# Patient Record
Sex: FEMALE | Race: BLACK OR AFRICAN AMERICAN | Employment: FULL TIME | URBAN - METROPOLITAN AREA
[De-identification: names, ages, dates, MRNs, and addresses within clinical notes are randomized per-mention and may not be internally consistent; named-entity substitution may affect disease eponyms.]

---

## 2022-03-03 ENCOUNTER — APPOINTMENT (OUTPATIENT)
Dept: MRI IMAGING | Age: 37
DRG: 074 | End: 2022-03-03
Attending: HOSPITALIST
Payer: COMMERCIAL

## 2022-03-03 ENCOUNTER — APPOINTMENT (OUTPATIENT)
Dept: CT IMAGING | Age: 37
DRG: 074 | End: 2022-03-03
Attending: STUDENT IN AN ORGANIZED HEALTH CARE EDUCATION/TRAINING PROGRAM
Payer: COMMERCIAL

## 2022-03-03 ENCOUNTER — APPOINTMENT (OUTPATIENT)
Dept: CT IMAGING | Age: 37
DRG: 074 | End: 2022-03-03
Attending: EMERGENCY MEDICINE
Payer: COMMERCIAL

## 2022-03-03 ENCOUNTER — HOSPITAL ENCOUNTER (INPATIENT)
Age: 37
LOS: 5 days | Discharge: HOME | DRG: 074 | End: 2022-03-09
Attending: STUDENT IN AN ORGANIZED HEALTH CARE EDUCATION/TRAINING PROGRAM | Admitting: HOSPITALIST
Payer: COMMERCIAL

## 2022-03-03 DIAGNOSIS — R26.81 GAIT INSTABILITY: ICD-10-CM

## 2022-03-03 DIAGNOSIS — A92.30: ICD-10-CM

## 2022-03-03 DIAGNOSIS — D72.829 LEUKOCYTOSIS, UNSPECIFIED TYPE: ICD-10-CM

## 2022-03-03 DIAGNOSIS — R29.898 RIGHT ARM WEAKNESS: ICD-10-CM

## 2022-03-03 DIAGNOSIS — R20.0 RIGHT FACIAL NUMBNESS: Primary | ICD-10-CM

## 2022-03-03 DIAGNOSIS — Z92.25 HISTORY OF IMMUNOSUPPRESSION THERAPY: ICD-10-CM

## 2022-03-03 DIAGNOSIS — B02.9 VARICELLA-ZOSTER INFECTION: ICD-10-CM

## 2022-03-03 DIAGNOSIS — G04.91 ACUTE MYELITIS (HCC): ICD-10-CM

## 2022-03-03 DIAGNOSIS — M05.79 RHEUMATOID ARTHRITIS INVOLVING MULTIPLE SITES WITH POSITIVE RHEUMATOID FACTOR (HCC): ICD-10-CM

## 2022-03-03 DIAGNOSIS — G04.90 ENCEPHALITIS: ICD-10-CM

## 2022-03-03 LAB
ALBUMIN SERPL-MCNC: 3.4 G/DL (ref 3.5–5)
ALBUMIN/GLOB SERPL: 0.9 {RATIO} (ref 1.1–2.2)
ALP SERPL-CCNC: 44 U/L (ref 45–117)
ALT SERPL-CCNC: 28 U/L (ref 12–78)
ANION GAP SERPL CALC-SCNC: 4 MMOL/L (ref 5–15)
AST SERPL-CCNC: 30 U/L (ref 15–37)
BASOPHILS # BLD: 0 K/UL (ref 0–0.1)
BASOPHILS NFR BLD: 0 % (ref 0–1)
BILIRUB SERPL-MCNC: 0.3 MG/DL (ref 0.2–1)
BUN SERPL-MCNC: 16 MG/DL (ref 6–20)
BUN/CREAT SERPL: 17 (ref 12–20)
CALCIUM SERPL-MCNC: 8.8 MG/DL (ref 8.5–10.1)
CHLORIDE SERPL-SCNC: 105 MMOL/L (ref 97–108)
CO2 SERPL-SCNC: 26 MMOL/L (ref 21–32)
COMMENT, HOLDF: NORMAL
CREAT SERPL-MCNC: 0.95 MG/DL (ref 0.55–1.02)
DIFFERENTIAL METHOD BLD: ABNORMAL
EOSINOPHIL # BLD: 0.1 K/UL (ref 0–0.4)
EOSINOPHIL NFR BLD: 1 % (ref 0–7)
ERYTHROCYTE [DISTWIDTH] IN BLOOD BY AUTOMATED COUNT: 14.8 % (ref 11.5–14.5)
GLOBULIN SER CALC-MCNC: 3.9 G/DL (ref 2–4)
GLUCOSE BLD STRIP.AUTO-MCNC: 43 MG/DL (ref 65–117)
GLUCOSE BLD STRIP.AUTO-MCNC: 49 MG/DL (ref 65–117)
GLUCOSE BLD STRIP.AUTO-MCNC: 63 MG/DL (ref 65–117)
GLUCOSE BLD STRIP.AUTO-MCNC: 73 MG/DL (ref 65–117)
GLUCOSE SERPL-MCNC: 88 MG/DL (ref 65–100)
HCT VFR BLD AUTO: 33.8 % (ref 35–47)
HGB BLD-MCNC: 11 G/DL (ref 11.5–16)
IMM GRANULOCYTES # BLD AUTO: 0 K/UL (ref 0–0.04)
IMM GRANULOCYTES NFR BLD AUTO: 0 % (ref 0–0.5)
INR PPP: 1.1 (ref 0.9–1.1)
LYMPHOCYTES # BLD: 1.4 K/UL (ref 0.8–3.5)
LYMPHOCYTES NFR BLD: 13 % (ref 12–49)
MCH RBC QN AUTO: 27.5 PG (ref 26–34)
MCHC RBC AUTO-ENTMCNC: 32.5 G/DL (ref 30–36.5)
MCV RBC AUTO: 84.5 FL (ref 80–99)
MONOCYTES # BLD: 0.7 K/UL (ref 0–1)
MONOCYTES NFR BLD: 7 % (ref 5–13)
NEUTS SEG # BLD: 8.6 K/UL (ref 1.8–8)
NEUTS SEG NFR BLD: 79 % (ref 32–75)
NRBC # BLD: 0 K/UL (ref 0–0.01)
NRBC BLD-RTO: 0 PER 100 WBC
PLATELET # BLD AUTO: 348 K/UL (ref 150–400)
PMV BLD AUTO: 9 FL (ref 8.9–12.9)
POTASSIUM SERPL-SCNC: 3.8 MMOL/L (ref 3.5–5.1)
PROT SERPL-MCNC: 7.3 G/DL (ref 6.4–8.2)
PROTHROMBIN TIME: 11 SEC (ref 9–11.1)
RBC # BLD AUTO: 4 M/UL (ref 3.8–5.2)
SAMPLES BEING HELD,HOLD: NORMAL
SERVICE CMNT-IMP: ABNORMAL
SERVICE CMNT-IMP: NORMAL
SODIUM SERPL-SCNC: 135 MMOL/L (ref 136–145)
WBC # BLD AUTO: 10.8 K/UL (ref 3.6–11)

## 2022-03-03 PROCEDURE — 72156 MRI NECK SPINE W/O & W/DYE: CPT

## 2022-03-03 PROCEDURE — A9575 INJ GADOTERATE MEGLUMI 0.1ML: HCPCS | Performed by: RADIOLOGY

## 2022-03-03 PROCEDURE — 70553 MRI BRAIN STEM W/O & W/DYE: CPT

## 2022-03-03 PROCEDURE — 70450 CT HEAD/BRAIN W/O DYE: CPT

## 2022-03-03 PROCEDURE — 85610 PROTHROMBIN TIME: CPT

## 2022-03-03 PROCEDURE — 80053 COMPREHEN METABOLIC PANEL: CPT

## 2022-03-03 PROCEDURE — 74011250636 HC RX REV CODE- 250/636: Performed by: HOSPITALIST

## 2022-03-03 PROCEDURE — 74011000636 HC RX REV CODE- 636: Performed by: RADIOLOGY

## 2022-03-03 PROCEDURE — 82962 GLUCOSE BLOOD TEST: CPT

## 2022-03-03 PROCEDURE — 0042T CT CODE NEURO PERF W CBF: CPT

## 2022-03-03 PROCEDURE — 74011250636 HC RX REV CODE- 250/636: Performed by: RADIOLOGY

## 2022-03-03 PROCEDURE — G0378 HOSPITAL OBSERVATION PER HR: HCPCS

## 2022-03-03 PROCEDURE — 74011250637 HC RX REV CODE- 250/637: Performed by: HOSPITALIST

## 2022-03-03 PROCEDURE — 70496 CT ANGIOGRAPHY HEAD: CPT

## 2022-03-03 PROCEDURE — 99285 EMERGENCY DEPT VISIT HI MDM: CPT

## 2022-03-03 PROCEDURE — 36415 COLL VENOUS BLD VENIPUNCTURE: CPT

## 2022-03-03 PROCEDURE — 93005 ELECTROCARDIOGRAM TRACING: CPT

## 2022-03-03 PROCEDURE — 85025 COMPLETE CBC W/AUTO DIFF WBC: CPT

## 2022-03-03 RX ORDER — SODIUM CHLORIDE 9 MG/ML
50 INJECTION, SOLUTION INTRAVENOUS CONTINUOUS
Status: DISCONTINUED | OUTPATIENT
Start: 2022-03-03 | End: 2022-03-05

## 2022-03-03 RX ORDER — ACETAMINOPHEN 325 MG/1
650 TABLET ORAL
Status: DISCONTINUED | OUTPATIENT
Start: 2022-03-03 | End: 2022-03-09 | Stop reason: HOSPADM

## 2022-03-03 RX ORDER — GUAIFENESIN 100 MG/5ML
81 LIQUID (ML) ORAL DAILY
Status: DISCONTINUED | OUTPATIENT
Start: 2022-03-04 | End: 2022-03-09 | Stop reason: HOSPADM

## 2022-03-03 RX ORDER — CILOSTAZOL 100 MG/1
50 TABLET ORAL
Status: DISCONTINUED | OUTPATIENT
Start: 2022-03-04 | End: 2022-03-04

## 2022-03-03 RX ORDER — ACETAMINOPHEN 650 MG/1
650 SUPPOSITORY RECTAL
Status: DISCONTINUED | OUTPATIENT
Start: 2022-03-03 | End: 2022-03-09

## 2022-03-03 RX ORDER — NIFEDIPINE 30 MG/1
30 TABLET, EXTENDED RELEASE ORAL DAILY
Status: DISCONTINUED | OUTPATIENT
Start: 2022-03-04 | End: 2022-03-09 | Stop reason: HOSPADM

## 2022-03-03 RX ORDER — GADOTERATE MEGLUMINE 376.9 MG/ML
10 INJECTION INTRAVENOUS
Status: COMPLETED | OUTPATIENT
Start: 2022-03-03 | End: 2022-03-03

## 2022-03-03 RX ADMIN — SODIUM CHLORIDE 75 ML/HR: 9 INJECTION, SOLUTION INTRAVENOUS at 19:51

## 2022-03-03 RX ADMIN — GADOTERATE MEGLUMINE 10 ML: 376.9 INJECTION INTRAVENOUS at 19:27

## 2022-03-03 RX ADMIN — IOPAMIDOL 140 ML: 755 INJECTION, SOLUTION INTRAVENOUS at 15:48

## 2022-03-03 RX ADMIN — ACETAMINOPHEN 650 MG: 325 TABLET ORAL at 19:59

## 2022-03-03 NOTE — ED PROVIDER NOTES
HPI     Patient is a 43-year-old female who presents today with right facial numbness, right arm weakness, and unsteady gait. Patient says that she woke up yesterday and noticed that she could not feel her right face and had numbness in her right arm. Said that today, symptoms got worse, and now she is having instability with walking. She came to the ER for further evaluation. She is not on blood thinners. No aggravating alleviating factors. No past medical history on file. No past surgical history on file. No family history on file. Social History     Socioeconomic History    Marital status:      Spouse name: Not on file    Number of children: Not on file    Years of education: Not on file    Highest education level: Not on file   Occupational History    Not on file   Tobacco Use    Smoking status: Not on file    Smokeless tobacco: Not on file   Substance and Sexual Activity    Alcohol use: Not on file    Drug use: Not on file    Sexual activity: Not on file   Other Topics Concern    Not on file   Social History Narrative    Not on file     Social Determinants of Health     Financial Resource Strain:     Difficulty of Paying Living Expenses: Not on file   Food Insecurity:     Worried About Running Out of Food in the Last Year: Not on file    Vick of Food in the Last Year: Not on file   Transportation Needs:     Lack of Transportation (Medical): Not on file    Lack of Transportation (Non-Medical):  Not on file   Physical Activity:     Days of Exercise per Week: Not on file    Minutes of Exercise per Session: Not on file   Stress:     Feeling of Stress : Not on file   Social Connections:     Frequency of Communication with Friends and Family: Not on file    Frequency of Social Gatherings with Friends and Family: Not on file    Attends Islam Services: Not on file    Active Member of Clubs or Organizations: Not on file    Attends Club or Organization Meetings: Not on file    Marital Status: Not on file   Intimate Partner Violence:     Fear of Current or Ex-Partner: Not on file    Emotionally Abused: Not on file    Physically Abused: Not on file    Sexually Abused: Not on file   Housing Stability:     Unable to Pay for Housing in the Last Year: Not on file    Number of [place] in the Last Year: Not on file    Unstable Housing in the Last Year: Not on file         ALLERGIES: Patient has no known allergies. Review of Systems   Constitutional: Negative for appetite change and fever. HENT: Negative for congestion and rhinorrhea. Eyes: Negative for discharge and redness. Respiratory: Negative for cough and shortness of breath. Cardiovascular: Negative for chest pain. Gastrointestinal: Negative for abdominal pain, diarrhea, nausea and vomiting. Genitourinary: Negative for decreased urine volume and dysuria. Musculoskeletal: Negative for back pain. Skin: Negative for rash and wound. Neurological: Positive for weakness and numbness. Negative for seizures and headaches. Hematological: Does not bruise/bleed easily. Psychiatric/Behavioral: Negative for agitation. All other systems reviewed and are negative. Vitals:    03/03/22 1529 03/03/22 1614 03/03/22 1631   BP: 125/86 127/86    Pulse: 94  (!) 117   Resp: 16 28 26   Temp: 97.7 °F (36.5 °C)     SpO2: 99%     Weight: 66.7 kg (147 lb)     Height: 5' 3\" (1.6 m)              Physical Exam  Vitals and nursing note reviewed. Constitutional:       General: She is not in acute distress. Appearance: Normal appearance. HENT:      Head: Normocephalic and atraumatic. Nose: Nose normal.      Mouth/Throat:      Mouth: Mucous membranes are moist.      Pharynx: Oropharynx is clear. Eyes:      Extraocular Movements: Extraocular movements intact. Conjunctiva/sclera: Conjunctivae normal.      Pupils: Pupils are equal, round, and reactive to light.    Cardiovascular:      Rate and Rhythm: Normal rate and regular rhythm. Pulses: Normal pulses. Heart sounds: Normal heart sounds. No murmur heard. No friction rub. No gallop. Pulmonary:      Effort: Pulmonary effort is normal.      Breath sounds: Normal breath sounds. Abdominal:      General: Bowel sounds are normal. There is no distension. Palpations: Abdomen is soft. Tenderness: There is no abdominal tenderness. Musculoskeletal:         General: Normal range of motion. Cervical back: Normal range of motion. Skin:     General: Skin is warm and dry. Capillary Refill: Capillary refill takes less than 2 seconds. Neurological:      General: No focal deficit present. Mental Status: She is alert and oriented to person, place, and time. Mental status is at baseline. Comments: No aphasia. No dysarthria. 4/5 MSK right upper extremity. 5 MSK left upper extremity, bilateral lower extremities. Numbness and paresthesias to the right face, neck, and right arm   Psychiatric:         Mood and Affect: Mood normal.          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 Patient said that she first noticed numbness to her right face and right arm yesterday. She said that at work this morning, she started to have difficulty with her gait. She came to the ER for further evaluation. Patient was made a level 2 stroke alert. CT unremarkable. Labs with hypoglycemia. Patient given juice, still with a glucose in the 60s. Plan to give more juice, and reassess. If needed, patient will need IV dextrose. I spoke with neurology, with plan to admit for MRI and MR I of the cervical spine.     Perfect Serve Consult for Admission  5:22 PM    ED Room Number: WU70/49  Patient Name and age:  Erin Hall 39 y.o.  female  Working Diagnosis:   1. Right facial numbness    2. Right arm weakness    3. Gait instability        COVID-19 Suspicion:  no  Sepsis present:  no  Reassessment needed: no  Code Status:  Full Code  Readmission: no  Isolation Requirements:  no  Recommended Level of Care:  med/surg  Department:[facility] - [phone]  Other: Patient is a 26-year-old female who presents today with right face numbness, right arm weakness, and gait instability. Patient said that she started to have numbness and weakness to her right arm yesterday. Today, she was at work when she started to have gait instability. She came to the ER for further evaluation. She was made a level 2 stroke alert. TeleNeurology evaluate the patient. CT head markable. Patient found to be hypoglycemic. Patient given glucose, still with glucose in the 60s. Plan to reassess and give IV dextrose as needed. Neurology recommended other work-up with MR brain and cervical spine. LABORATORY TESTS:  Labs Reviewed   METABOLIC PANEL, COMPREHENSIVE - Abnormal; Notable for the following components:       Result Value    Sodium 135 (*)     Anion gap 4 (*)     Alk. phosphatase 44 (*)     Albumin 3.4 (*)     A-G Ratio 0.9 (*)     All other components within normal limits   CBC WITH AUTOMATED DIFF - Abnormal; Notable for the following components:    HGB 11.0 (*)     HCT 33.8 (*)     RDW 14.8 (*)     NEUTROPHILS 79 (*)     ABS.  NEUTROPHILS 8.6 (*)     All other components within normal limits   GLUCOSE, POC - Abnormal; Notable for the following components:    Glucose (POC) 49 (*)     All other components within normal limits   GLUCOSE, POC - Abnormal; Notable for the following components:    Glucose (POC) 43 (*)     All other components within normal limits   GLUCOSE, POC - Abnormal; Notable for the following components:    Glucose (POC) 63 (*) All other components within normal limits   PROTHROMBIN TIME + INR   SAMPLES BEING HELD       IMAGING RESULTS:  CTA CODE NEURO HEAD AND NECK W CONT   Final Result   CTA Head:   1. No evidence of significant stenosis or aneurysm. CTA Neck:   1. No evidence of significant stenosis. 2. An 11 mm subpleural groundglass nodular opacity in the left upper lobe,   likely infectious or inflammatory. However, follow-up chest CT in 3 months is   recommended. CT Brain Perfusion:   1. No acute abnormality. CT CODE NEURO PERF W CBF   Final Result   CTA Head:   1. No evidence of significant stenosis or aneurysm. CTA Neck:   1. No evidence of significant stenosis. 2. An 11 mm subpleural groundglass nodular opacity in the left upper lobe,   likely infectious or inflammatory. However, follow-up chest CT in 3 months is   recommended. CT Brain Perfusion:   1. No acute abnormality. CT CODE NEURO HEAD WO CONTRAST   Final Result   No acute findings. MEDICATIONS GIVEN:  Medications   iopamidoL (ISOVUE-370) 76 % injection (has no administration in time range)   iopamidoL (ISOVUE-370) 76 % injection 200 mL (140 mL IntraVENous Given 3/3/22 2837)            CONSULTS:  Neurology    IMPRESSION:  1. Right facial numbness    2. Right arm weakness    3.  Gait instability        PLAN:  - Admit to hospitalist    Roxanna Osman MD            Procedures

## 2022-03-03 NOTE — ED NOTES
Blood glucose not checked in triage by triage nurse prior to calling Code Stroke Level 2. Critical level of 43 blood glucose, MD notified.

## 2022-03-03 NOTE — ED TRIAGE NOTES
Pt to ER with c/o right arm weakness, head pain, and right sided head numbness since waking yesterday at 0600. Pt denies taking blood thinners.

## 2022-03-04 PROBLEM — G04.91: Status: ACTIVE | Noted: 2022-03-04

## 2022-03-04 LAB
APPEARANCE CSF: CLEAR
CHOLEST SERPL-MCNC: 159 MG/DL
COLOR CSF: COLORLESS
COVID-19 RAPID TEST, COVR: NOT DETECTED
CRYPTOCOCCUS NEOFORMANS/GATTII, CRNEOG: NOT DETECTED
CYTOMEGALOVIRUS, CYMEG: NOT DETECTED
ENTEROVIRUS, ENTVIR: NOT DETECTED
ESCHERICHIA COLI K1, ECK1: NOT DETECTED
EST. AVERAGE GLUCOSE BLD GHB EST-MCNC: 111 MG/DL
GLUCOSE CSF-MCNC: 53 MG/DL (ref 40–70)
HAEMOPHILUS INFLUENZAE, HAEFLU: NOT DETECTED
HBA1C MFR BLD: 5.5 % (ref 4–5.6)
HDLC SERPL-MCNC: 38 MG/DL
HDLC SERPL: 4.2 {RATIO} (ref 0–5)
HERPES SIMPLEX VIRUS 2, HSIMV2: NOT DETECTED
HSV1 DNA CSF QL NAA+PROBE: NOT DETECTED
HUMAN HERPESVIRUS 6, HUHV6: NOT DETECTED
HUMAN PARECHOVIRUS, HUPARV: NOT DETECTED
LDLC SERPL CALC-MCNC: 79.8 MG/DL (ref 0–100)
LISTERIA MONOCYTOGENES, LISMON: NOT DETECTED
LYMPHOCYTES NFR CSF MANUAL: 94 % (ref 28–96)
MONOCYTES NFR CSF MANUAL: 6 % (ref 16–56)
NEISSERIA MENINGITIDIS, NEIMEN: NOT DETECTED
PROT CSF-MCNC: 42 MG/DL (ref 15–45)
RBC # CSF: 0 /CU MM
SOURCE, COVRS: NORMAL
STREPTOCOCCUS AGALACTIAE, SAGA: NOT DETECTED
STREPTOCOCCUS PNEUMONIAE, STRPNE: NOT DETECTED
TRIGL SERPL-MCNC: 206 MG/DL (ref ?–150)
TSH SERPL DL<=0.05 MIU/L-ACNC: 3.1 UIU/ML (ref 0.36–3.74)
TUBE # CSF: 1
TUBE # CSF: 1
TUBE # CSF: 3
VARICELLA ZOSTER VIRUS, VAZOVI: DETECTED
VLDLC SERPL CALC-MCNC: 41.2 MG/DL
WBC # CSF: 31 /CU MM (ref 0–5)

## 2022-03-04 PROCEDURE — 86592 SYPHILIS TEST NON-TREP QUAL: CPT

## 2022-03-04 PROCEDURE — 65270000029 HC RM PRIVATE

## 2022-03-04 PROCEDURE — 84157 ASSAY OF PROTEIN OTHER: CPT

## 2022-03-04 PROCEDURE — 87205 SMEAR GRAM STAIN: CPT

## 2022-03-04 PROCEDURE — 82164 ANGIOTENSIN I ENZYME TEST: CPT

## 2022-03-04 PROCEDURE — 89050 BODY FLUID CELL COUNT: CPT

## 2022-03-04 PROCEDURE — 82945 GLUCOSE OTHER FLUID: CPT

## 2022-03-04 PROCEDURE — 86617 LYME DISEASE ANTIBODY: CPT

## 2022-03-04 PROCEDURE — 74011000258 HC RX REV CODE- 258: Performed by: INTERNAL MEDICINE

## 2022-03-04 PROCEDURE — 74011250637 HC RX REV CODE- 250/637: Performed by: HOSPITALIST

## 2022-03-04 PROCEDURE — 36415 COLL VENOUS BLD VENIPUNCTURE: CPT

## 2022-03-04 PROCEDURE — 4A03X5D MEASUREMENT OF ARTERIAL FLOW, INTRACRANIAL, EXTERNAL APPROACH: ICD-10-PCS | Performed by: RADIOLOGY

## 2022-03-04 PROCEDURE — 80061 LIPID PANEL: CPT

## 2022-03-04 PROCEDURE — 74011250637 HC RX REV CODE- 250/637: Performed by: INTERNAL MEDICINE

## 2022-03-04 PROCEDURE — 74011250636 HC RX REV CODE- 250/636: Performed by: NURSE PRACTITIONER

## 2022-03-04 PROCEDURE — 86788 WEST NILE VIRUS AB IGM: CPT

## 2022-03-04 PROCEDURE — 87483 CNS DNA AMP PROBE TYPE 12-25: CPT

## 2022-03-04 PROCEDURE — G0378 HOSPITAL OBSERVATION PER HR: HCPCS

## 2022-03-04 PROCEDURE — 87635 SARS-COV-2 COVID-19 AMP PRB: CPT

## 2022-03-04 PROCEDURE — 74011250636 HC RX REV CODE- 250/636: Performed by: INTERNAL MEDICINE

## 2022-03-04 PROCEDURE — 74011000258 HC RX REV CODE- 258: Performed by: NURSE PRACTITIONER

## 2022-03-04 PROCEDURE — 86051 AQUAPORIN-4 ANTB ELISA: CPT

## 2022-03-04 PROCEDURE — 84443 ASSAY THYROID STIM HORMONE: CPT

## 2022-03-04 PROCEDURE — 99255 IP/OBS CONSLTJ NEW/EST HI 80: CPT | Performed by: PSYCHIATRY & NEUROLOGY

## 2022-03-04 PROCEDURE — 83036 HEMOGLOBIN GLYCOSYLATED A1C: CPT

## 2022-03-04 PROCEDURE — 87015 SPECIMEN INFECT AGNT CONCNTJ: CPT

## 2022-03-04 PROCEDURE — 87210 SMEAR WET MOUNT SALINE/INK: CPT

## 2022-03-04 PROCEDURE — 96374 THER/PROPH/DIAG INJ IV PUSH: CPT

## 2022-03-04 PROCEDURE — 62270 DX LMBR SPI PNXR: CPT | Performed by: PSYCHIATRY & NEUROLOGY

## 2022-03-04 PROCEDURE — 87899 AGENT NOS ASSAY W/OPTIC: CPT

## 2022-03-04 PROCEDURE — 87102 FUNGUS ISOLATION CULTURE: CPT

## 2022-03-04 RX ORDER — CILOSTAZOL 50 MG/1
100 TABLET ORAL DAILY
COMMUNITY
End: 2022-03-30

## 2022-03-04 RX ORDER — OXYCODONE AND ACETAMINOPHEN 5; 325 MG/1; MG/1
1 TABLET ORAL
Status: DISCONTINUED | OUTPATIENT
Start: 2022-03-04 | End: 2022-03-09 | Stop reason: HOSPADM

## 2022-03-04 RX ORDER — ENOXAPARIN SODIUM 100 MG/ML
40 INJECTION SUBCUTANEOUS DAILY
Status: DISCONTINUED | OUTPATIENT
Start: 2022-03-04 | End: 2022-03-09 | Stop reason: HOSPADM

## 2022-03-04 RX ORDER — UPADACITINIB 15 MG/1
15 TABLET, EXTENDED RELEASE ORAL
Status: ON HOLD | COMMUNITY
End: 2022-03-09 | Stop reason: SDUPTHER

## 2022-03-04 RX ORDER — CILOSTAZOL 100 MG/1
100 TABLET ORAL
Status: DISCONTINUED | OUTPATIENT
Start: 2022-03-05 | End: 2022-03-09 | Stop reason: HOSPADM

## 2022-03-04 RX ORDER — DROSPIRENONE 4 MG/1
1 TABLET, FILM COATED ORAL DAILY
COMMUNITY
End: 2022-03-30

## 2022-03-04 RX ORDER — LORAZEPAM 1 MG/1
1 TABLET ORAL ONCE
Status: COMPLETED | OUTPATIENT
Start: 2022-03-04 | End: 2022-03-04

## 2022-03-04 RX ORDER — NIFEDIPINE 30 MG/1
30 TABLET, FILM COATED, EXTENDED RELEASE ORAL
COMMUNITY
End: 2022-03-30

## 2022-03-04 RX ADMIN — CILOSTAZOL 50 MG: 100 TABLET ORAL at 07:37

## 2022-03-04 RX ADMIN — ASPIRIN 81 MG: 81 TABLET, CHEWABLE ORAL at 09:18

## 2022-03-04 RX ADMIN — OXYCODONE AND ACETAMINOPHEN 1 TABLET: 5; 325 TABLET ORAL at 02:33

## 2022-03-04 RX ADMIN — SODIUM CHLORIDE 1000 MG: 900 INJECTION INTRAVENOUS at 13:12

## 2022-03-04 RX ADMIN — NIFEDIPINE 30 MG: 30 TABLET, FILM COATED, EXTENDED RELEASE ORAL at 20:42

## 2022-03-04 RX ADMIN — OXYCODONE AND ACETAMINOPHEN 1 TABLET: 5; 325 TABLET ORAL at 06:12

## 2022-03-04 RX ADMIN — ACYCLOVIR SODIUM 650 MG: 50 INJECTION, SOLUTION INTRAVENOUS at 21:00

## 2022-03-04 RX ADMIN — OXYCODONE AND ACETAMINOPHEN 1 TABLET: 5; 325 TABLET ORAL at 17:23

## 2022-03-04 RX ADMIN — LORAZEPAM 1 MG: 1 TABLET ORAL at 20:42

## 2022-03-04 NOTE — PROGRESS NOTES
Occupational Therapy: OT order received, chart reviewed- patient has been ambulating and toileting independently per chart review. Patient approached for OT services- educated patient and spouse on role of OT. Patient reports only symptoms as R side facial numbness. CVA ruled out. Patient reports independence with ADL tasks and ambulation- though feeling generally weak in this AM which she attributes to warmth in the room and not eating. Skilled OT services not indicated at this time.  Will complete order, please reconsult should ADL impairment arise  Krissy Motta OTR/L

## 2022-03-04 NOTE — PROGRESS NOTES
Flako Mer[facility] [address]  [address] [facility], [address] [place] [address]  [phone]      Medical Progress Note      NAME: Erin Hall   :  1985  MRM:  [id number]    Date/Time of service: 3/4/2022  9:56 AM       Subjective:     Chief Complaint:  Patient was personally seen and examined by me during this time period. Chart reviewed. Still with right facial numbness       Objective:       Vitals:       Last 24hrs VS reviewed since prior progress note.  Most recent are:    Visit Vitals  BP (!) 107/92 (BP 1 Location: Left arm)   Pulse 90   Temp 98.6 °F (37 °C)   Resp 22   Ht 5' 3\" (1.6 m)   Wt 66.7 kg (147 lb)   SpO2 99%   BMI 26.04 kg/m²     SpO2 Readings from Last 6 Encounters:   22 99%            Intake/Output Summary (Last 24 hours) at 3/4/2022 0956  Last data filed at 3/4/2022 0818  Gross per 24 hour   Intake 240 ml   Output --   Net 240 ml        Exam:     Physical Exam:    Gen:  Well-developed, well-nourished, in no acute distress  HEENT:  Pink conjunctivae, PERRL, hearing intact to voice, moist mucous membranes  Neck:  Supple, without masses, thyroid non-tender  Resp:  No accessory muscle use, clear breath sounds without wheezes rales or rhonchi  Card:  No murmurs, normal S1, S2 without thrills, bruits or peripheral edema  Abd:  Soft, non-tender, non-distended, normoactive bowel sounds are present  Musc:  No cyanosis or clubbing  Skin:  No rashes   Neuro:  Right facial numbness, strength symmetrical, follows commands appropriately  Psych:  Good insight, oriented to person, place and time, alert    Medications Reviewed: (see below)    Lab Data Reviewed: (see below)    ______________________________________________________________________    Medications:     Current Facility-Administered Medications   Medication Dose Route Frequency    oxyCODONE-acetaminophen (PERCOCET) 5-325 mg per tablet 1 Tablet  1 Tablet Oral Q4H PRN    [START ON 3/5/2022] cilostazoL (PLETAL) tablet 100 mg 100 mg Oral ACB    upadacitinib Tb24 15 mg (Patient Supplied)  15 mg Oral QHS    acetaminophen (TYLENOL) tablet 650 mg  650 mg Oral Q4H PRN    Or    acetaminophen (TYLENOL) solution 650 mg  650 mg Per NG tube Q4H PRN    Or    acetaminophen (TYLENOL) suppository 650 mg  650 mg Rectal Q4H PRN    aspirin chewable tablet 81 mg  81 mg Oral DAILY    0.9% sodium chloride infusion  75 mL/hr IntraVENous CONTINUOUS    NIFEdipine ER (PROCARDIA XL) tablet 30 mg  30 mg Oral DAILY          Lab Review:     Recent Labs     03/03/22  1621   WBC 10.8   HGB 11.0*   HCT 33.8*        Recent Labs     03/03/22  1621   *   K 3.8      CO2 26   GLU 88   BUN 16   CREA 0.95   CA 8.8   ALB 3.4*   TBILI 0.3   ALT 28   INR 1.1     Lab Results   Component Value Date/Time    Glucose (POC) 73 03/03/2022 05:40 PM    Glucose (POC) 63 (L) 03/03/2022 05:06 PM    Glucose (POC) 43 (LL) 03/03/2022 04:08 PM    Glucose (POC) 49 (LL) 03/03/2022 04:05 PM          Assessment / Plan:     38 yo hx of RA, presented w/ R facial numbness, R sided weakness, found to have inflammation from inferior medulla through C5, concerning for myelitis    1) Acute myelitis: likely has transverse myelitis on head/neck MRI. Will need high dose IV steroids.   Awaiting Neuro to eval    2) RA: cont upadacitinib    3) Raynaud's: cont nifedipine, pleta    Total time spent with patient: 35 min **I personally saw and examined the patient during this time period**                 Care Plan discussed with: Patient, nursing,      Discussed:  Care Plan    Prophylaxis:  Lovenox    Disposition:  Home w/Family           ___________________________________________________    Attending Physician: Carmina Gibbs MD

## 2022-03-04 NOTE — ED NOTES
Verbal shift change report given to Giovanni Ellis RN (oncoming nurse) by Que De Los Santos (offgoing nurse). Report included the following information SBAR, Kardex, ED Summary, Intake/Output, MAR and Recent Results.

## 2022-03-04 NOTE — ED NOTES
TRANSFER - OUT REPORT:    Verbal report given to Homero RN(name) on [facility]  being transferred to PCC/neuro(unit) for routine progression of care       Report consisted of patients Situation, Background, Assessment and   Recommendations(SBAR). Information from the following report(s) SBAR, Kardex, ED Summary, Intake/Output, MAR, Recent Results and Cardiac Rhythm NSR was reviewed with the receiving nurse. Lines:   Peripheral IV 03/03/22 Right Antecubital (Active)        Opportunity for questions and clarification was provided.       Patient transported with:   Monitor  Tech - transport

## 2022-03-04 NOTE — PROGRESS NOTES
SLP attempted to see patient. Dx: IMPRESSION  Extensive continuous abnormality of signal involving the inferior medulla  through the cervical cord to the C5 level. Differential diagnostic  considerations include neuromyelitis optica, acute disseminated  encephalomyelitis, or transverse myelitis of other inflammatory causes. She reported that this dx that involves her R facial swelling  is not affecting her speech and swallowing. SLP evaluation deferred.

## 2022-03-04 NOTE — PROGRESS NOTES
1930  Bedside and Verbal shift change report given to Armand Galeas RN (oncoming nurse) by Tung Cole RN (offgoing nurse). Report included the following information SBAR, Kardex, Intake/Output, MAR, Recent Results and Med Rec Status.

## 2022-03-04 NOTE — CONSULTS
NEUROLOGY IN-PATIENT NEW CONSULTATION      3/4/2022    RE: Darline Toney         1985      REFERRED BY:  Selma Childress DO      CHIEF COMPLAINT:  This is Darline Toney is a 39 y.o. female   who had concerns including Extremity Weakness. HPI:     2 weeks ago, patient started having R neck pain. Eventually, patient developed numbness of the R side of face and R neck. (+) R UE weakness  (-) incontinence    Patient history of rheumatoid arthritis on Upadacitinib seeing a rheumatologist.    ROS   (-) fever  (-) rash  All other systems reviewed and are negative    Past Medical Hx  Past Medical History:   Diagnosis Date    Raynaud's disease     Rheumatoid arthritis ([facility] [address]        Social Hx  Social History     Socioeconomic History    Marital status:        Family Hx  No family history on file.     ALLERGIES  No Known Allergies    CURRENT MEDS  Current Facility-Administered Medications   Medication Dose Route Frequency Provider Last Rate Last Admin    oxyCODONE-acetaminophen (PERCOCET) 5-325 mg per tablet 1 Tablet  1 Tablet Oral Q4H PRN Jannette Wilburn MD   1 Tablet at 03/04/22 0612    [START ON 3/5/2022] cilostazoL (PLETAL) tablet 100 mg  100 mg Oral ACB Davis Jose MD        upadacitinib Tb24 15 mg (Patient Supplied)  15 mg Oral QHS Davis Jose MD        enoxaparin (LOVENOX) injection 40 mg  40 mg SubCUTAneous DAILY Davis Jose MD        methylPREDNISolone (Solu-MEDROL) 1 g in 100 mL NS MBP  1 g IntraVENous DAILY Edwards Adriana, Reina MONROE NP        acetaminophen (TYLENOL) tablet 650 mg  650 mg Oral Q4H PRN Airewele, Tona Siemens, MD   650 mg at 03/03/22 1959    Or    acetaminophen (TYLENOL) solution 650 mg  650 mg Per NG tube Q4H PRN Airewele, Tona Siemens, MD        Or    acetaminophen (TYLENOL) suppository 650 mg  650 mg Rectal Q4H PRN Airewele, Tona Siemens, MD        aspirin chewable tablet 81 mg  81 mg Oral DAILY Airewele, Tona Siemens, MD   81 mg at 03/04/22 0918    0.9% sodium chloride infusion 50 mL/hr IntraVENous CONTINUOUS Davis Jose MD 75 mL/hr at 03/03/22 1951 75 mL/hr at 03/03/22 1951    NIFEdipine ER (PROCARDIA XL) tablet 30 mg  30 mg Oral DAILY Gertrude Garcia MD               PREVIOUS WORKUP: (reviewed)  IMAGING:    CT Results (recent):  Results from Hospital Encounter encounter on 03/03/22    CT CODE NEURO PERF W CBF    Narrative  EXAM:  CTA CODE NEURO HEAD AND NECK W CONT, CT CODE NEURO PERF W CBF    INDICATION:   stroke    COMPARISON:  CT head 3/3/2022. CONTRAST:  140 mL of Isovue-370. TECHNIQUE:  Unenhanced  images were obtained to localize the volume for  acquisition. Multislice helical axial CT angiography was performed from the  aortic arch to the top of the head during uneventful rapid bolus intravenous  contrast administration. Coronal and sagittal reformations and 3D post  processing was performed. CT dose reduction was achieved through use of a  standardized protocol tailored for this examination and automatic exposure  control for dose modulation. CT brain perfusion was performed with generation of hemodynamic maps of multiple  parameters, including cerebral blood flow, cerebral blood volume, and MTT (mean  transit time). CT dose reduction was achieved through use of a standardized  protocol tailored for this examination and automatic exposure control for dose  modulation. This study was analyzed by the [address]  algorithm. FINDINGS:    CTA Head:  There is no evidence of large vessel occlusion or flow-limiting stenosis of the  intracranial internal carotid, anterior cerebral, and middle cerebral arteries. The anterior communicating artery is patent. There is no evidence of large vessel occlusion or flow-limiting stenosis of the  intracranial vertebral arteries, basilar artery, or posterior cerebral arteries. The posterior communicating arteries are patent. There is no evidence of aneurysm or vascular malformation.  The dural venous  sinuses and deep cerebral venous system are patent. No evidence of abnormal  enhancement on delayed phase images. CTA NECK:  NASCET method was utilized for calculating stenosis. The aortic arch is unremarkable. The common carotid arteries demonstrate no  significant stenosis. There is no evidence of significant stenosis in the  cervical right internal carotid artery. There is no evidence of significant  stenosis in the cervical left internal carotid artery. There is a codominant vertebrobasilar arterial system. The cervical vertebral  arteries are normal in course, size and contour without significant stenosis. Visualized soft tissues of the neck are unremarkable. Incidental 5 mm left  thyroid nodule. There is an 11 mm subpleural groundglass nodular opacity in the  anterior left upper lobe (series 3 image 10). No acute fracture or aggressive  osseous lesion. Reversal of the cervical lordosis. CT Brain Perfusion:  There are no regional areas of elevated Tmax, decreased cerebral blood flow or  blood volume. rCBF < 30% = 0 cc. Tmax > 6 seconds = 0 cc. Impression  CTA Head:  1. No evidence of significant stenosis or aneurysm. CTA Neck:  1. No evidence of significant stenosis. 2. An 11 mm subpleural groundglass nodular opacity in the left upper lobe,  likely infectious or inflammatory. However, follow-up chest CT in 3 months is  recommended. CT Brain Perfusion:  1. No acute abnormality. MRI Results (recent):  Results from [place] encounter on 03/03/22    MRI CERV SPINE W WO CONT    Narrative  EXAM: MRI CERV SPINE W WO CONT    INDICATION: Evaluate for MS.    COMPARISON: None    TECHNIQUE: MR imaging of the cervical spine was performed using the following  sequences: sagittal T1, T2, STIR;  axial T2, T1 prior to and following contrast  administration. CONTRAST: 20 mL of Dotarem.     FINDINGS:    There is continuous T2 and STIR hyperintense signal shown within the central and  right side of the cord, extending from the inferior level of the medulla through  the C5 level, without cord enhancement abnormality. Distal cervical cord and  upper thoracic cord signal and enhancement appear normal. Vertebral body height,  bone signal and bone enhancement are normal. There is reversal of cervical  lordosis centered at C5 with otherwise anatomic alignment. No paraspinal soft  tissue mass is shown. C2-C3: No herniation or stenosis. C3-C4: Normal disc height. Mild rightward lateralizing diffuse disc bulging. No  facet arthropathy or canal-foraminal stenosis. C4-C5: Mild disc space narrowing with minimal diffuse disc osteophyte complex  formation. No facet arthropathy or canal-foraminal stenosis. C5-C6: Normal disc height with minimal central disc bulging. No facet  arthropathy or canal-foraminal stenosis. C6-C7: Normal disc height with mild central and bilateral paracentral disc  bulging. No facet arthropathy or canal-foraminal stenosis. C7-T1 and upper thoracic segments: No herniation or stenosis. Impression  Extensive central and right-sided inferior medullary and cord signal abnormality  through C5 level. Differential diagnostic considerations include neuromyelitis  optica, acute disseminated encephalomyelitis, and other causes of transverse  myelitis. IR Results (recent):  No results found for this or any previous visit. VAS/US Results (recent):  No results found for this or any previous visit.           LABS (reviewed)  Results for orders placed or performed during the hospital encounter of 03/03/22   COVID-19 RAPID TEST   Result Value Ref Range    Specimen source Nasopharyngeal      COVID-19 rapid test Not detected NOTD     METABOLIC PANEL, COMPREHENSIVE   Result Value Ref Range    Sodium 135 (L) 136 - 145 mmol/L    Potassium 3.8 3.5 - 5.1 mmol/L    Chloride 105 97 - 108 mmol/L    CO2 26 21 - 32 mmol/L    Anion gap 4 (L) 5 - 15 mmol/L    Glucose 88 65 - 100 mg/dL    BUN 16 6 - 20 MG/DL    Creatinine 0.95 0.55 - 1.02 MG/DL    BUN/Creatinine ratio 17 12 - 20      GFR est AA >60 >60 ml/min/1.73m2    GFR est non-AA >60 >60 ml/min/1.73m2    Calcium 8.8 8.5 - 10.1 MG/DL    Bilirubin, total 0.3 0.2 - 1.0 MG/DL    ALT (SGPT) 28 12 - 78 U/L    AST (SGOT) 30 15 - 37 U/L    Alk. phosphatase 44 (L) 45 - 117 U/L    Protein, total 7.3 6.4 - 8.2 g/dL    Albumin 3.4 (L) 3.5 - 5.0 g/dL    Globulin 3.9 2.0 - 4.0 g/dL    A-G Ratio 0.9 (L) 1.1 - 2.2     CBC WITH AUTOMATED DIFF   Result Value Ref Range    WBC 10.8 3.6 - 11.0 K/uL    RBC 4.00 3.80 - 5.20 M/uL    HGB 11.0 (L) 11.5 - 16.0 g/dL    HCT 33.8 (L) 35.0 - 47.0 %    MCV 84.5 80.0 - 99.0 FL    MCH 27.5 26.0 - 34.0 PG    MCHC 32.5 30.0 - 36.5 g/dL    RDW 14.8 (H) 11.5 - 14.5 %    PLATELET 520 411 - 967 K/uL    MPV 9.0 8.9 - 12.9 FL    NRBC 0.0 0  WBC    ABSOLUTE NRBC 0.00 0.00 - 0.01 K/uL    NEUTROPHILS 79 (H) 32 - 75 %    LYMPHOCYTES 13 12 - 49 %    MONOCYTES 7 5 - 13 %    EOSINOPHILS 1 0 - 7 %    BASOPHILS 0 0 - 1 %    IMMATURE GRANULOCYTES 0 0.0 - 0.5 %    ABS. NEUTROPHILS 8.6 (H) 1.8 - 8.0 K/UL    ABS. LYMPHOCYTES 1.4 0.8 - 3.5 K/UL    ABS. MONOCYTES 0.7 0.0 - 1.0 K/UL    ABS. EOSINOPHILS 0.1 0.0 - 0.4 K/UL    ABS. BASOPHILS 0.0 0.0 - 0.1 K/UL    ABS. IMM. GRANS. 0.0 0.00 - 0.04 K/UL    DF AUTOMATED     PROTHROMBIN TIME + INR   Result Value Ref Range    INR 1.1 0.9 - 1.1      Prothrombin time 11.0 9.0 - 11.1 sec   SAMPLES BEING HELD   Result Value Ref Range    SAMPLES BEING HELD 1red,1sst,1grn     COMMENT        Add-on orders for these samples will be processed based on acceptable specimen integrity and analyte stability, which may vary by analyte.    LIPID PANEL   Result Value Ref Range    Cholesterol, total 159 <200 MG/DL    Triglyceride 206 (H) <150 MG/DL    HDL Cholesterol 38 MG/DL    LDL, calculated 79.8 0 - 100 MG/DL    VLDL, calculated 41.2 MG/DL    CHOL/HDL Ratio 4.2 0.0 - 5.0     HEMOGLOBIN A1C WITH EAG   Result Value Ref Range Hemoglobin A1c 5.5 4.0 - 5.6 %    Est. average glucose 111 mg/dL   TSH 3RD GENERATION   Result Value Ref Range    TSH 3.10 0.36 - 3.74 uIU/mL   PROTEIN, CSF   Result Value Ref Range    Tube No. 1      Protein,CSF 42 15 - 45 MG/DL   GLUCOSE, CSF   Result Value Ref Range    Tube No. 1      Glucose,CSF 53 40 - 70 MG/DL   CELL COUNT, CSF   Result Value Ref Range    CSF TUBE NO. 3      CSF COLOR COLORLESS COL      CSF APPEARANCE CLEAR CLEAR      CSF RBCs 0 0 /cu mm    CSF WBCs 31 (H) 0 - 5 /cu mm   GLUCOSE, POC   Result Value Ref Range    Glucose (POC) 49 (LL) 65 - 117 mg/dL    Performed by Gualberto Gong    GLUCOSE, POC   Result Value Ref Range    Glucose (POC) 43 (LL) 65 - 117 mg/dL    Performed by Gualberto Gong    GLUCOSE, POC   Result Value Ref Range    Glucose (POC) 63 (L) 65 - 117 mg/dL    Performed by Jagdish Coelho    GLUCOSE, POC   Result Value Ref Range    Glucose (POC) 73 65 - 117 mg/dL    Performed by Trey Bartholomew        Physical Exam:     Visit Vitals  BP 99/68 (BP 1 Location: Left arm, BP Patient Position: At rest)   Pulse 97   Temp 98.6 °F (37 °C)   Resp 24   Ht 5' 3\" (1.6 m)   Wt 66.7 kg (147 lb)   SpO2 99%   BMI 26.04 kg/m²     General:  Alert, cooperative, no distress. Head:  Normocephalic, without obvious abnormality, atraumatic. Eyes:  Conjunctivae/corneas clear. Lungs:  Heart:   Non labored breathing  Regular rate and rhythm, no carotid bruits   Abdomen:   Soft, non-distended   Extremities: Extremities normal, atraumatic, no cyanosis or edema. Pulses: 2+ and symmetric all extremities. Skin: Skin color, texture, turgor normal. No rashes or lesions.   Neurologic Exam     Gen: Attention normal             Language: naming, repetition, fluency normal             Memory: intact recent and remote memory  Cranial Nerves:  I: smell Not tested   II: visual fields Full to confrontation   II: pupils Equal, round, reactive to light   II: optic disc No papilledema   III,VII: ptosis none III,IV,VI: extraocular muscles  Full ROM   V: mastication normal   V: facial light touch sensation  normal   VII: facial muscle function   symmetric   VIII: hearing symmetric   IX: soft palate elevation  normal   XI: trapezius strength  5/5   XI: sternocleidomastoid strength 5/5   XI: neck flexion strength  5/5   XII: tongue  midline     Motor: normal bulk and tone, no tremor              Strength: 5/5 all four extremities  Sensory: intact to LT, PP, vibration, and temperature  Reflexes: 2+ throughout; Down going toes  Coordination: Good FTN and HTS  Gait: deferred           Impression:     Magda Kyle is a 39 y.o. female who  has a past medical history of Raynaud's disease and Rheumatoid arthritis ([facility] [address] who 2 weeks ago,  started having R neck pain. Eventually, patient developed numbness of the R side of face and R neck. (+) R UE weakness. MRI brain unremarkable. 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 Consideration includes longitudinal myelitis due to history of autoimmune disease (has  rheumatoid arthritis on Upadacitinib). Patient should be evaluated for Neuromyelitis optica and other inflammatory and infectious process. RECOMMENDATIONS  1. I had a long discussion with patient and husbad. Discussed diagnosis, prognosis, pathophysiology and available treatment. Reviewed test results. All questions were answered. 2. Spinal tap done. See note below  3. CSF sent for multiple testing  4. Reviewed MRI Brain and cervical spine images  5. Solu lMedrol 1 gm IV x 5 days  6. Physical therapy      Please call for questions    Dr Radha Thorne is taking over neurology service this afternoon. Thank you for the consultation      Lumbar Puncture Procedure Note      3/4/2022    Patient ID:  Magda Kyle  [id number]  1985       Indications: Diagnostic     Procedure Details:      Consent: Informed consent obtained.  A time out was performed at the start of the procedure with nursing in room identifying pt as correct patient and correct procedure.       Under sterile conditions the patient was positioned. Betadine solution and sterile drapes were utilized. A spinal needle 20 was inserted at the L3 - L4 interspace.      Spinal fluid was obtained and sent to the laboratory.     Findings  16 cc of clear spinal fluid was obtained in 4 tubes and sent to lab. Opening Pressure: was not measured due to patient condition.        Complications:  None; patient tolerated the procedure well.           Condition: stable     Plan  Bed Rest as tolerated     Attending Attestation: I performed the procedure      Angeles Cunningham MD  Diplomate, American Board of Psychiatry and Neurology  Diplomate, Neuromuscular Medicine  Diplomate, American Board of Electrodiagnostic Medicine    Greater than 50% of time spent counseling patient        CC: Rhona Slaughter DO  Fax: [phone]

## 2022-03-04 NOTE — PROGRESS NOTES
Reason for Admission:  Right facial numbness, swelling. Hx rheumatoid arthritis, Raynauds. COVID19 Vaccine:  yes      type:  Pfizer x 2.                   RUR Score:     observation                Plan for utilizing home health:     None, no DME. PCP: First and Last name:  Briana Bonilla DO     Name of Practice:    Are you a current patient: Yes/No:  yes   Approximate date of last visit:  yesterday   Can you participate in a virtual visit with your PCP:   yes                    Current Advanced Directive/Advance Care Plan: Full Code    Healthcare Decision Maker:   Click here to complete [address] including selection of the Healthcare Decision Maker Relationship (ie \"Primary\")            Byron Hammond  [phone]                  Transition of Care Plan:  Chart reviewed, demographics verified. CM role and follow up discussed. Met with patient at bedside, face mask on. Patient lives with her . Patient lives in a two story home with 6 steps to enter home and 13 steps to upper level. Patient has prescription drug coverage, uses [facility] on [facility]   Patient is independent, drives, and provides self care. Patient performs ADLs independently. Current status:  Patient currently requiring medical management including ongoing assessment and monitoring. High dose IV solumedrol. Neurology following. LP today. Await PT/OT evaluations. PLAN:  1. Monitor patient response to treatment and recommendations. 2. Medical management continues. 3. Await PT/OT evaluations and patient progress. 4. Patient transport home per  at discharge. 5. CM to monitor clinical progress and disposition recommendations. Care Management Interventions  PCP Verified by CM: Yes (Dr. Alex Dean)  Mode of Transport at Discharge:  Other (see comment) (per )  Transition of Care Consult (CM Consult): Discharge Planning  Physical Therapy Consult: Yes  Occupational Therapy Consult: Yes  Speech Therapy Consult: Yes  Support Systems: Spouse/Significant Other  Confirm Follow Up Transport: Family  The Plan for Transition of Care is Related to the Following Treatment Goals : Return h ome at DC  Discharge Location  Patient Expects to be Discharged to[de-identified] Home with family assistance    Lb Alanis RN, MSN, Care manager

## 2022-03-04 NOTE — PROGRESS NOTES
0800-TRANSFER - IN REPORT:    Verbal report received from MARCK Mccann(name) on Ken Ramos  being received from ED(unit) for routine progression of care      Report consisted of patients Situation, Background, Assessment and   Recommendations(SBAR). Information from the following report(s) SBAR, Kardex, ED Summary, OR Summary, Procedure Summary, Intake/Output, MAR, Accordion, Recent Results and Med Rec Status was reviewed with the receiving nurse. Opportunity for questions and clarification was provided. Assessment completed upon patients arrival to unit and care assumed. Stroke Education provided to patient and the following topics were discussed    1. Patients personal risk factors for stroke are none    2. Warning signs of Stroke:        * Sudden numbness or weakness of the face, arm or leg, especially on one side of          The body            * Sudden confusion, trouble speaking or understanding        * Sudden trouble seeing in one or both eyes        * Sudden trouble walking, dizziness, loss of balance or coordination        * Sudden severe headache with no known cause      3. Importance of activation Emergency Medical Services ( 9-1-1 ) immediately if experience any warning signs of stroke. 4. Be sure and schedule a follow-up appointment with your primary care doctor or any specialists as instructed. 5. You must take medicine every day to treat your risk factors for stroke. Be sure to take your medicines exactly as your doctor tells you: no more, no less. Know what your medicines are for , what they do. Anti-thrombotics /anticoagulants can help prevent strokes. You are taking the following medicine(s)  Aspirin     6. Smoking and second-hand smoke greatly increase your risk of stroke, cardiovascular disease and death. Smoking history never    7. Information provided was [facility] Stroke Education Binder, Stroke Handouts or Verbal Education    8.  Documentation of teaching completed in Patient Education Activity and on Care Plan with teaching response noted? yes    TRANSFER - OUT REPORT:    Verbal report given to Hardeep Mahajan RN(name) on [facility]  being transferred to [facility](unit) for routine progression of care       Report consisted of patients Situation, Background, Assessment and   Recommendations(SBAR). Information from the following report(s) SBAR, Kardex, ED Summary, OR Summary, Procedure Summary, Intake/Output, MAR, Accordion, Recent Results and Med Rec Status was reviewed with the receiving nurse. Lines:   Peripheral IV 03/04/22 Left;Posterior Hand (Active)        Opportunity for questions and clarification was provided.       Patient transported with:   Registered Nurse  Tech

## 2022-03-04 NOTE — H&P
History & Physical    Primary Care Provider: Simon Carlos DO  Source of Information: Patient   Chief complaint: Right facial numbness and swelling    History of Presenting Illness:   Sharyon Severance is a 39 y.o. female with past medical history of Rheumatoid arthritis, raynaud's disease who presents to the ER with the complaint of right facial numbness. She states she was apparently in her normal state of health when she developed numbness and swelling of her right face with associated tingling sensation extending from the right shoulder to the right finger tips. She denies extremity weakness, diplopia, seizures or syncope    The patient denies any fever, chills, chest pain, cough, congestion, recent illness, palpitations, or dysuria. In the ER, Head CT scan performed showed no acute abnormality. CTA head and neck negative for stenosis, occlusion or aneurysm. There was 11 mm subpleural groundglass nodular opacity in the left upper lobe          Review of Systems:  A comprehensive review of systems was negative except for that written in the History of Present Illness. No past medical history on file. No past surgical history on file. Prior to Admission medications    Not on File     No Known Allergies   No family history on file. SOCIAL HISTORY:  Patient resides:   Independently    Assisted Living    SNF    With family care       Smoking history:   None x   Former    Chronic      Alcohol history:   None x   Social    Chronic      Ambulates:   Independently x   w/cane    w/walker    w/wc    CODE STATUS:  DNR    Full x   Other      Objective:     Physical Exam:     Visit Vitals  /86   Pulse (!) 117   Temp 97.7 °F (36.5 °C)   Resp 26   Ht 5' 3\" (1.6 m)   Wt 66.7 kg (147 lb)   SpO2 99%   BMI 26.04 kg/m²      O2 Device: None (Room air)    General:  Alert, cooperative, no distress, appears stated age.    Head:  Normocephalic, without obvious abnormality, atraumatic. Eyes:  Conjunctivae/corneas clear. PERRL, EOMs intact. Nose: Nares normal. Septum midline. Mucosa normal. No drainage or sinus tenderness. Throat: Lips, mucosa, and tongue normal. Teeth and gums normal.   Neck: Supple,    Back:   Symmetric, no curvature. ROM normal. No CVA tenderness. Lungs:   Clear to auscultation bilaterally. Chest wall:  No tenderness or deformity. Heart:  Regular rate and rhythm, S1, S2 normal, no murmur, click, rub or gallop. Abdomen:   Soft, non-tender. Bowel sounds normal. No masses,  No organomegaly. Extremities: Extremities normal, atraumatic, no cyanosis or edema. Pulses: 2+ and symmetric all extremities. Skin: Skin color, texture, turgor normal. No rashes or lesions   Neurologic: CNII-XII intact. Right facial numbness. No pronator drift. Power is 5/5 in all extremities         Data Review:     Recent Days:  Recent Labs     03/03/22  1621   WBC 10.8   HGB 11.0*   HCT 33.8*        Recent Labs     03/03/22  1621   *   K 3.8      CO2 26   GLU 88   BUN 16   CREA 0.95   CA 8.8   ALB 3.4*   ALT 28   INR 1.1     No results for input(s): PH, PCO2, PO2, HCO3, FIO2 in the last 72 hours.     24 Hour Results:  Recent Results (from the past 24 hour(s))   GLUCOSE, POC    Collection Time: 03/03/22  4:05 PM   Result Value Ref Range    Glucose (POC) 49 (LL) 65 - 117 mg/dL    Performed by [place] , POC    Collection Time: 03/03/22  4:08 PM   Result Value Ref Range    Glucose (POC) 43 (LL) 65 - 117 mg/dL    Performed by [address] [place] [address], COMPREHENSIVE    Collection Time: 03/03/22  4:21 PM   Result Value Ref Range    Sodium 135 (L) 136 - 145 mmol/L    Potassium 3.8 3.5 - 5.1 mmol/L    Chloride 105 97 - 108 mmol/L    CO2 26 21 - 32 mmol/L    Anion gap 4 (L) 5 - 15 mmol/L    Glucose 88 65 - 100 mg/dL    BUN 16 6 - 20 MG/DL    Creatinine 0.95 0.55 - 1.02 MG/DL    BUN/Creatinine ratio 17 12 - 20      GFR est AA >60 >60 ml/min/1.73m2    GFR est non-AA >60 >60 ml/min/1.73m2    Calcium 8.8 8.5 - 10.1 MG/DL    Bilirubin, total 0.3 0.2 - 1.0 MG/DL    ALT (SGPT) 28 12 - 78 U/L    AST (SGOT) 30 15 - 37 U/L    Alk. phosphatase 44 (L) 45 - 117 U/L    Protein, total 7.3 6.4 - 8.2 g/dL    Albumin 3.4 (L) 3.5 - 5.0 g/dL    Globulin 3.9 2.0 - 4.0 g/dL    A-G Ratio 0.9 (L) 1.1 - 2.2     CBC WITH AUTOMATED DIFF    Collection Time: 03/03/22  4:21 PM   Result Value Ref Range    WBC 10.8 3.6 - 11.0 K/uL    RBC 4.00 3.80 - 5.20 M/uL    HGB 11.0 (L) 11.5 - 16.0 g/dL    HCT 33.8 (L) 35.0 - 47.0 %    MCV 84.5 80.0 - 99.0 FL    MCH 27.5 26.0 - 34.0 PG    MCHC 32.5 30.0 - 36.5 g/dL    RDW 14.8 (H) 11.5 - 14.5 %    PLATELET 808 614 - 631 K/uL    MPV 9.0 8.9 - 12.9 FL    NRBC 0.0 0  WBC    ABSOLUTE NRBC 0.00 0.00 - 0.01 K/uL    NEUTROPHILS 79 (H) 32 - 75 %    LYMPHOCYTES 13 12 - 49 %    MONOCYTES 7 5 - 13 %    EOSINOPHILS 1 0 - 7 %    BASOPHILS 0 0 - 1 %    IMMATURE GRANULOCYTES 0 0.0 - 0.5 %    ABS. NEUTROPHILS 8.6 (H) 1.8 - 8.0 K/UL    ABS. LYMPHOCYTES 1.4 0.8 - 3.5 K/UL    ABS. MONOCYTES 0.7 0.0 - 1.0 K/UL    ABS. EOSINOPHILS 0.1 0.0 - 0.4 K/UL    ABS. BASOPHILS 0.0 0.0 - 0.1 K/UL    ABS. IMM. GRANS. 0.0 0.00 - 0.04 K/UL    DF AUTOMATED     PROTHROMBIN TIME + INR    Collection Time: 03/03/22  4:21 PM   Result Value Ref Range    INR 1.1 0.9 - 1.1      Prothrombin time 11.0 9.0 - 11.1 sec   SAMPLES BEING HELD    Collection Time: 03/03/22  4:21 PM   Result Value Ref Range    SAMPLES BEING HELD 1red,1sst,1grn     COMMENT        Add-on orders for these samples will be processed based on acceptable specimen integrity and analyte stability, which may vary by analyte.    GLUCOSE, POC    Collection Time: 03/03/22  5:06 PM   Result Value Ref Range    Glucose (POC) 63 (L) 65 - 117 mg/dL    Performed by Joe Coelho    GLUCOSE, POC    Collection Time: 03/03/22  5:40 PM   Result Value Ref Range    Glucose (POC) 73 65 - 117 mg/dL    Performed by Sean Fabian          Imaging:     Assessment:     Principal Problem:    Right facial numbness (3/3/2022)           Plan:     1. Right facial numbness/and swelling: POA  Head CT scan and CTA of the head and neck was unremarkable for stroke  The main DD is multiple Sclerosis  - We will obtain brain and cervical spine MRI with contrast to rule out demyelinating lesion of the brain and spine  - We will consult neuro for eval  - Neuro checks as per floor protocol    2. H/o Rheumatoid arthritis      Raynaud's disease  - resume home Cilostazol and Nifedipine    3. DVT PPX: SCD    4. Dispo:  Admit patient on observation to neuro floor  Surrogate decision maker: - Natacha Sjogren  Baseline functional status: Independent  Code Status: Full       Signed By: Vanessa Ybarra MD     March 3, 2022

## 2022-03-04 NOTE — PROGRESS NOTES
03/04/22    State Observation Letter was verbally explained to patient and provided in writing to patient. The patient signed the document.

## 2022-03-04 NOTE — PROGRESS NOTES
Admission Medication Reconciliation:     Information obtained from:    Patient via interview in [facility]:  YES    Comments/Recommendations:   Patient able to confirm name, , allergies, and preferred pharmacy  Updated PTA medication list  The patient's family will supply her own Rinvoq. The patient will notify the nurse when this medication arrives. The patient takes Rinvoq at bedtime. The patient takes Slynd (drospirenone) intermittently. It is prescribed daily but she forgets to take it. Her last dose was last week. She has a history of a partial hysterectomy. ¹[employer] pharmacy benefit data reflects medications filled and processed through the patient's insurance, however   this data does NOT capture whether the medication was picked up or is currently being taken by the patient. Prior to Admission Medications   Prescriptions Last Dose Informant Taking? NIFEdipine ER (ADALAT CC) 30 mg ER tablet 3/3/2022 at Unknown time Self Yes   Sig: Take 30 mg by mouth nightly. cilostazoL (PLETAL) 50 mg tablet 3/3/2022 at Unknown time Self Yes   Sig: Take 100 mg by mouth daily. drospirenone, contraceptive, (Slynd) 4 mg (28) tab 2022 at Unknown time Self Yes   Sig: Take 1 Tablet by mouth daily. upadacitinib (Rinvoq) 15 mg Tb24 3/3/2022 at Unknown time Self Yes   Sig: Take 15 mg by mouth nightly. Indications: rheumatoid arthritis      Facility-Administered Medications: None         Please contact the main inpatient pharmacy with any questions or concerns at [phone] and we will direct you to the clinical pharmacist covering this patient's care while in-house.    Lexie Emmanuel, JohnD, BCPS

## 2022-03-04 NOTE — PROGRESS NOTES
Physical Therapy orders acknowledged, chart reviewed and discussed with nurse patient laving lumbar puncture at bedside. We will continue to follow up with the patient for therapy thank you.

## 2022-03-05 LAB
ANION GAP SERPL CALC-SCNC: 5 MMOL/L (ref 5–15)
BUN SERPL-MCNC: 12 MG/DL (ref 6–20)
BUN/CREAT SERPL: 17 (ref 12–20)
CALCIUM SERPL-MCNC: 8.8 MG/DL (ref 8.5–10.1)
CHLORIDE SERPL-SCNC: 106 MMOL/L (ref 97–108)
CO2 SERPL-SCNC: 24 MMOL/L (ref 21–32)
CREAT SERPL-MCNC: 0.71 MG/DL (ref 0.55–1.02)
ERYTHROCYTE [DISTWIDTH] IN BLOOD BY AUTOMATED COUNT: 14.7 % (ref 11.5–14.5)
GLUCOSE SERPL-MCNC: 138 MG/DL (ref 65–100)
HCT VFR BLD AUTO: 32.3 % (ref 35–47)
HGB BLD-MCNC: 10.6 G/DL (ref 11.5–16)
INDIA INK PREP CSF: NORMAL
MAGNESIUM SERPL-MCNC: 2 MG/DL (ref 1.6–2.4)
MCH RBC QN AUTO: 27.9 PG (ref 26–34)
MCHC RBC AUTO-ENTMCNC: 32.8 G/DL (ref 30–36.5)
MCV RBC AUTO: 85 FL (ref 80–99)
NRBC # BLD: 0 K/UL (ref 0–0.01)
NRBC BLD-RTO: 0 PER 100 WBC
PHOSPHATE SERPL-MCNC: 4.1 MG/DL (ref 2.6–4.7)
PLATELET # BLD AUTO: 369 K/UL (ref 150–400)
PMV BLD AUTO: 9.1 FL (ref 8.9–12.9)
POTASSIUM SERPL-SCNC: 4.5 MMOL/L (ref 3.5–5.1)
RBC # BLD AUTO: 3.8 M/UL (ref 3.8–5.2)
SERVICE CMNT-IMP: NORMAL
SODIUM SERPL-SCNC: 135 MMOL/L (ref 136–145)
WBC # BLD AUTO: 7.6 K/UL (ref 3.6–11)

## 2022-03-05 PROCEDURE — 99255 IP/OBS CONSLTJ NEW/EST HI 80: CPT | Performed by: INTERNAL MEDICINE

## 2022-03-05 PROCEDURE — 74011250637 HC RX REV CODE- 250/637: Performed by: INTERNAL MEDICINE

## 2022-03-05 PROCEDURE — 74011250636 HC RX REV CODE- 250/636: Performed by: INTERNAL MEDICINE

## 2022-03-05 PROCEDURE — 99233 SBSQ HOSP IP/OBS HIGH 50: CPT | Performed by: PSYCHIATRY & NEUROLOGY

## 2022-03-05 PROCEDURE — 74011250637 HC RX REV CODE- 250/637: Performed by: HOSPITALIST

## 2022-03-05 PROCEDURE — 65270000029 HC RM PRIVATE

## 2022-03-05 PROCEDURE — 36415 COLL VENOUS BLD VENIPUNCTURE: CPT

## 2022-03-05 PROCEDURE — 84100 ASSAY OF PHOSPHORUS: CPT

## 2022-03-05 PROCEDURE — 83735 ASSAY OF MAGNESIUM: CPT

## 2022-03-05 PROCEDURE — 74011000258 HC RX REV CODE- 258: Performed by: INTERNAL MEDICINE

## 2022-03-05 PROCEDURE — 74011250636 HC RX REV CODE- 250/636: Performed by: NURSE PRACTITIONER

## 2022-03-05 PROCEDURE — 80048 BASIC METABOLIC PNL TOTAL CA: CPT

## 2022-03-05 PROCEDURE — 65660000000 HC RM CCU STEPDOWN

## 2022-03-05 PROCEDURE — 85027 COMPLETE CBC AUTOMATED: CPT

## 2022-03-05 PROCEDURE — 74011000258 HC RX REV CODE- 258: Performed by: NURSE PRACTITIONER

## 2022-03-05 RX ORDER — SODIUM CHLORIDE 9 MG/ML
75 INJECTION, SOLUTION INTRAVENOUS CONTINUOUS
Status: DISCONTINUED | OUTPATIENT
Start: 2022-03-05 | End: 2022-03-09

## 2022-03-05 RX ORDER — POLYETHYLENE GLYCOL 3350 17 G/17G
17 POWDER, FOR SOLUTION ORAL DAILY
Status: DISCONTINUED | OUTPATIENT
Start: 2022-03-05 | End: 2022-03-09 | Stop reason: HOSPADM

## 2022-03-05 RX ORDER — POLYETHYLENE GLYCOL 3350 17 G/17G
17 POWDER, FOR SOLUTION ORAL DAILY
Status: DISCONTINUED | OUTPATIENT
Start: 2022-03-06 | End: 2022-03-09 | Stop reason: HOSPADM

## 2022-03-05 RX ADMIN — SODIUM CHLORIDE 100 ML/HR: 9 INJECTION, SOLUTION INTRAVENOUS at 18:55

## 2022-03-05 RX ADMIN — ACYCLOVIR SODIUM 650 MG: 50 INJECTION, SOLUTION INTRAVENOUS at 23:08

## 2022-03-05 RX ADMIN — ACYCLOVIR SODIUM 650 MG: 50 INJECTION, SOLUTION INTRAVENOUS at 13:48

## 2022-03-05 RX ADMIN — POLYETHYLENE GLYCOL 3350 17 G: 17 POWDER, FOR SOLUTION ORAL at 18:54

## 2022-03-05 RX ADMIN — OXYCODONE AND ACETAMINOPHEN 1 TABLET: 5; 325 TABLET ORAL at 07:05

## 2022-03-05 RX ADMIN — NIFEDIPINE 30 MG: 30 TABLET, FILM COATED, EXTENDED RELEASE ORAL at 23:08

## 2022-03-05 RX ADMIN — ASPIRIN 81 MG: 81 TABLET, CHEWABLE ORAL at 09:05

## 2022-03-05 RX ADMIN — CILOSTAZOL 100 MG: 100 TABLET ORAL at 06:55

## 2022-03-05 RX ADMIN — ACYCLOVIR SODIUM 650 MG: 50 INJECTION, SOLUTION INTRAVENOUS at 06:17

## 2022-03-05 RX ADMIN — OXYCODONE AND ACETAMINOPHEN 1 TABLET: 5; 325 TABLET ORAL at 18:54

## 2022-03-05 RX ADMIN — OXYCODONE AND ACETAMINOPHEN 1 TABLET: 5; 325 TABLET ORAL at 00:10

## 2022-03-05 RX ADMIN — OXYCODONE AND ACETAMINOPHEN 1 TABLET: 5; 325 TABLET ORAL at 10:59

## 2022-03-05 RX ADMIN — SODIUM CHLORIDE 1000 MG: 900 INJECTION INTRAVENOUS at 10:59

## 2022-03-05 RX ADMIN — OXYCODONE AND ACETAMINOPHEN 1 TABLET: 5; 325 TABLET ORAL at 23:08

## 2022-03-05 NOTE — PROGRESS NOTES
Neurology Progress Note    Patient ID:  Gonzalez Hopson  [id number]  39 y.o.  1985      CC: right face, neck and arm paresthesia    Subjective:      Patient reports less intense right face, neck and arm paresthesia. Some sense of weakness right hand. No other complaint. Labs done revealed decreased hemoglobin at 10.6, decreased sodium at 135, increased glucose at 138. Normal magnesium and phosphorus. Meningitis panel CSF detected varicella-zoster virus. Patient has been started on acyclovir. CSF Gram stain revealed 1 WBC and no organisms seen. Cell count revealed 31 WBC and predominantly lymphocytic. Normal glucose and protein. Other CSF studies are pending. Patient is also day 2 of 1000 mg IV Solu-Medrol. Current Facility-Administered Medications   Medication Dose Route Frequency    oxyCODONE-acetaminophen (PERCOCET) 5-325 mg per tablet 1 Tablet  1 Tablet Oral Q4H PRN    cilostazoL (PLETAL) tablet 100 mg  100 mg Oral ACB    upadacitinib Tb24 15 mg (Patient Supplied)  15 mg Oral QHS    enoxaparin (LOVENOX) injection 40 mg  40 mg SubCUTAneous DAILY    methylPREDNISolone (Solu-MEDROL) 1 g in 100 mL NS MBP  1 g IntraVENous DAILY    acyclovir (ZOVIRAX) 650 mg in 0.9% sodium chloride 100 mL IVPB  10 mg/kg IntraVENous Q8H    acetaminophen (TYLENOL) tablet 650 mg  650 mg Oral Q4H PRN    Or    acetaminophen (TYLENOL) solution 650 mg  650 mg Per NG tube Q4H PRN    Or    acetaminophen (TYLENOL) suppository 650 mg  650 mg Rectal Q4H PRN    aspirin chewable tablet 81 mg  81 mg Oral DAILY    0.9% sodium chloride infusion  50 mL/hr IntraVENous CONTINUOUS    NIFEdipine ER (PROCARDIA XL) tablet 30 mg  30 mg Oral DAILY        Review of Systems:    Pertinent items are noted in HPI.     Objective:     Patient Vitals for the past 8 hrs:   BP Temp Pulse Resp SpO2   03/05/22 0752 120/72 98.4 °F (36.9 °C) (!) 117 17 100 %   03/05/22 0615 104/65 98.6 °F (37 °C) 93 18 97 %       No intake/output data recorded. 03/03 1901 - 03/05 0700  In: 2040 [P.O.:940; I.V.:1100]  Out: 0     Lab Review   Recent Results (from the past 24 hour(s))   PROTEIN, CSF    Collection Time: 03/04/22 11:02 AM   Result Value Ref Range    Tube No. 1      Protein,CSF 42 15 - 45 MG/DL   GLUCOSE, CSF    Collection Time: 03/04/22 11:02 AM   Result Value Ref Range    Tube No. 1      Glucose,CSF 53 40 - 70 MG/DL   CELL COUNT, CSF    Collection Time: 03/04/22 11:02 AM   Result Value Ref Range    CSF TUBE NO. 3      CSF COLOR COLORLESS COL      CSF APPEARANCE CLEAR CLEAR      CSF RBCs 0 0 /cu mm    CSF WBCs 31 (H) 0 - 5 /cu mm    CSF LYMPH 94 28 - 96 %    CSF MONO 6 (L) 16 - 56 %   CULTURE, CSF W GRAM STAIN    Collection Time: 03/04/22 11:04 AM    Specimen: Cerebrospinal Fluid   Result Value Ref Range    Special Requests: NO SPECIAL REQUESTS      GRAM STAIN 1+ WBCS SEEN      GRAM STAIN NO ORGANISMS SEEN      GRAM STAIN Smear Reviewed by Microbiology      Culture result: PENDING    MENINGITIS PATHOGENS PANEL, CSF (BY PCR)    Collection Time: 03/04/22 11:04 AM   Result Value Ref Range    Escherichia coli K1 Not detected NOTD      Haemophilus Influenzae Not detected NOTD      Listeria Monocytogenes Not detected NOTD      Neisseria Meningitidis Not detected NOTD      Streptococcus Agalactiae Not detected NOTD      Streptococcus Pneumoniae Not detected NOTD      Cytomegalovirus Not detected NOTD      Enterovirus Not detected NOTD      Herpes Simplex Virus 1 Not detected NOTD      Herpes Simplex Virus 2 Not detected NOTD      Human Herpesvirus 6 Not detected NOTD      Human Parechovirus Not detected NOTD      Varicella Zoster Virus Detected (AA) NOTD      Crypto.  neoformans/gattii Not detected NOTD     CBC W/O DIFF    Collection Time: 03/05/22  4:42 AM   Result Value Ref Range    WBC 7.6 3.6 - 11.0 K/uL    RBC 3.80 3.80 - 5.20 M/uL    HGB 10.6 (L) 11.5 - 16.0 g/dL    HCT 32.3 (L) 35.0 - 47.0 %    MCV 85.0 80.0 - 99.0 FL    MCH 27.9 26.0 - 34.0 PG MCHC 32.8 30.0 - 36.5 g/dL    RDW 14.7 (H) 11.5 - 14.5 %    PLATELET 890 137 - 510 K/uL    MPV 9.1 8.9 - 12.9 FL    NRBC 0.0 0  WBC    ABSOLUTE NRBC 0.00 0.00 - 3.50 K/uL   METABOLIC PANEL, BASIC    Collection Time: 03/05/22  4:42 AM   Result Value Ref Range    Sodium 135 (L) 136 - 145 mmol/L    Potassium 4.5 3.5 - 5.1 mmol/L    Chloride 106 97 - 108 mmol/L    CO2 24 21 - 32 mmol/L    Anion gap 5 5 - 15 mmol/L    Glucose 138 (H) 65 - 100 mg/dL    BUN 12 6 - 20 MG/DL    Creatinine 0.71 0.55 - 1.02 MG/DL    BUN/Creatinine ratio 17 12 - 20      GFR est AA >60 >60 ml/min/1.73m2    GFR est non-AA >60 >60 ml/min/1.73m2    Calcium 8.8 8.5 - 10.1 MG/DL   PHOSPHORUS    Collection Time: 03/05/22  4:42 AM   Result Value Ref Range    Phosphorus 4.1 2.6 - 4.7 MG/DL   MAGNESIUM    Collection Time: 03/05/22  4:42 AM   Result Value Ref Range    Magnesium 2.0 1.6 - 2.4 mg/dL     PHYSICAL EXAM:    NEUROLOGICAL EXAM:    Appearance: The patient is well developed, well nourished, provides a coherent history and is in no acute distress. Mental Status: Oriented to time, place and person. Fluent, no aphasia or dysarthria. Mood and affect appropriate. Cranial Nerves:   Intact visual fields. MOY, EOM's full, no nystagmus, no ptosis. Decrease cold right face. Corneal reflexes are intact. Facial movement is symmetric. Hearing is normal bilaterally. Palate is midline with normal elevation. Sternocleidomastoid and trapezius muscles are normal. Tongue is midline. Motor:  5/5 strength in upper and lower proximal and distal muscles. Normal bulk and tone. No fasciculations. No pronator drift. Reflexes:   Deep tendon reflexes 1+/4 and symmetrical. Downgoing toes. Sensory:   Normal to cold right neck and RUE. Gait:  Not tested. Tremor:   No tremor noted. Cerebellar:  Intact FTN/CUATE/HTS.            Assessment:   Acute myelitis  Acute encephalitis  Varicella zoster infection    Plan:   Neurological examination reveals decreased sensation right face neck and right upper extremity. Patient currently with acute myelitis/encephalitis secondary to currently to varicella-zoster infection. Likely brought about by her immunocompromised state due to treatments for her rheumatoid arthritis. Head CT without contrast did not reveal any acute process. Brain MRI with and without contrast did not reveal any abnormal enhancements. Extensive abnormal FLAIR and T2 hyperintensity at the inferior medulla extending through the upper cord which on accompanying cervical spine MRI extends to C5 level. No enhancement but with restricted diffusion. Cervical spine MRI with and without contrast again revealed continuous T2 and steer hyperintense signal shown within the central and right side of the cord extending from the inferior level of the medulla through the C5 level without cord enhancement abnormality. Reversal of cervical lordosis. Head and neck CTA did not reveal any flow-limiting stenosis or aneurysm. No ICA stenosis. CT perfusion study did not reveal any acute abnormality. Lumbar puncture was done which revealed cell count mainly WBC predominantly lymphocytic but normal glucose and protein. Meningitis panel was positive for varicella-zoster. Other CSF studies are pending. NMO antibody testing is pending. Infectious disease consult was ordered to opine whether there needs to be any changes to her treatment or to continue acyclovir. Also question whether patient needs to still be on IV Solu-Medrol. Continue IV Solu-Medrol 1000 mg x 5 days for now. May need to discuss with her rheumatologist with regards to her immunosuppressive therapy.     40 minutes was spent with patient, this included review of her medical record since admission as summarized, evaluation of the patient, referral to infectious disease and brief discussion with infectious disease doctor, formulating treatment plan and answering all the questions    This note was created using voice recognition software. Despite editing, there may be syntax errors.        Signed:  Yomi Hansen MD  3/5/2022  9:36 AM

## 2022-03-05 NOTE — CONSULTS
Infectious Disease Consult Note    Reason for Consult: VZV meningoencephalitis/myelitis, immunecompromised   Date of Consultation: March 5, 2022  Date of Admission: 3/3/2022  Referring Physician: Dr Seamus Ryder      HPI:  Sharyon Severance is a 39y.o. year old female with history of Raynaud's, rheumatoid arthritis ( was on Upadacitinib), eczema, partial hysterectomy for endometriosis who presented with right-sided headache, right arm weakness, right facial numbness that started acutely. Patient tells me that last week he felt like she had sharp burning pain in her shoulder deltoid area and it was painful to wear her scrubs. She did not notice a rash at that time. However she did have an \"eczema\"  flare up a week ago and was placed on doxycycline. She also reports taking steroids on Monday and history of this week. She is normally not on steroids and only takes them as needed with a short tapered course for rheumatologist.  Prior to starting Upadacitinib, she recalls having had fevers from had to go see a pulmonologist for possibly lung issues. This resolved and then she was placed on Upadacitinib. She denies any current edema or significant pain of any of her joints. With the episode of headache she had, it was right-sided. He denies any photophobia associated with it. Pain was severe and she felt numb on the right side. She says the headache is gone now. Right-sided facial numbness and right-sided upper extremity weakness is improving but not completely resolved yet. She denies any current fevers chills night sweats. Denies any nausea vomiting dysuria. She denies any other focal joint pain arthralgias, myalgias. She denies any respiratory symptoms including cough shortness of breath, chest pain. She denies a rash anywhere on her body. She denies any particular burning pain other than what she had before in the shoulder right deltoid area that has improved and almost resolved.   She denies any visual symptoms or ear pain or hearing issues. She denies any excessive weight loss. She denies any appetite loss    She reports having had chickenpox at the age of 11. 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 She does not recall any particular sick contacts but cannot be certain. To her knowledge she has not been around anyone with recent vaccination for varicella but again cannot be certain as she works with direct patient care. She denies exposure to any new animals or birds or any other occupational exposure. She denies any travel recently and last travel was to [facility] 3 years ago. She reports having had COVID vaccination with 2 shot series but has not had the booster yet    She was started on IV steroids and IV acyclovir . Past Medical History:  Past Medical History:   Diagnosis Date    Raynaud's disease     Rheumatoid arthritis ([facility] [address]          Surgical History:  No past surgical history on file. Family History:   No family history on file. Social History:     · Living Situation: At home with , works as a medical assistant  · Tobacco: Denied  · Alcohol: Denied  · Illicit Drugs: Denied  · Sexual History:    · Travel History denied recent history, last cystoscopy was 2 years ago to [facility]  · Exposures: Denied any animal or bird exposure  Allergies:  No Known Allergies      Review of Systems:    10 point review of history is obtained and pertinent positives per HPI. All others negative. Medications:  No current facility-administered medications on file prior to encounter. Current Outpatient Medications on File Prior to Encounter   Medication Sig Dispense Refill    cilostazoL (PLETAL) 50 mg tablet Take 100 mg by mouth daily.  NIFEdipine ER (ADALAT CC) 30 mg ER tablet Take 30 mg by mouth nightly.  upadacitinib (Rinvoq) 15 mg Tb24 Take 15 mg by mouth nightly.  Indications: rheumatoid arthritis      drospirenone, contraceptive, (Slynd) 4 mg (28) tab Take 1 Tablet by mouth daily.              Physical Exam:    Vitals:   Patient Vitals for the past 24 hrs:   Temp Pulse Resp BP SpO2   03/05/22 1100 98.9 °F (37.2 °C) (!) 120 16 109/73 98 %   03/05/22 0752 98.4 °F (36.9 °C) (!) 117 17 120/72 100 %   03/05/22 0615 98.6 °F (37 °C) 93 18 104/65 97 %   03/04/22 2355 99 °F (37.2 °C) 84 18 104/60 100 %   03/04/22 2042 -- 82 -- 113/73 --   03/04/22 1857 98.3 °F (36.8 °C) 89 18 108/78 98 %   03/04/22 1658 98.7 °F (37.1 °C) 97 18 119/76 100 %   03/04/22 1525 98.9 °F (37.2 °C) (!) 102 25 111/81 98 %   03/04/22 1500 -- (!) 105 -- -- --   ·   · GEN: NAD  · HEENT: Normocephalic, atraumatic, no scleral icterus,  no cervical lymphadenopathy palpated, no sinus tenderness, no thrush, moist oral mucosa, dentition fair, no facial droop, sensation to gross touch bilaterally  · Lungs: Nonlabored  · Abdomen: soft, non distended, non tender, no CVA tenderness   · Extremities: no edema  · Neuro: Alert, oriented to time, place and situation, moves all extremities to commands, verbal   · Skin: no rash, no vesicles, old scars noted on the upper back  · Psych: good affect, good eye contact, non tearful   · MSK decreased strength of the right hand compared to the left, good strength bilateral lower extremities      Labs:   Recent Results (from the past 24 hour(s))   CBC W/O DIFF    Collection Time: 03/05/22  4:42 AM   Result Value Ref Range    WBC 7.6 3.6 - 11.0 K/uL    RBC 3.80 3.80 - 5.20 M/uL    HGB 10.6 (L) 11.5 - 16.0 g/dL    HCT 32.3 (L) 35.0 - 47.0 %    MCV 85.0 80.0 - 99.0 FL    MCH 27.9 26.0 - 34.0 PG    MCHC 32.8 30.0 - 36.5 g/dL    RDW 14.7 (H) 11.5 - 14.5 %    PLATELET 279 329 - 217 K/uL    MPV 9.1 8.9 - 12.9 FL    NRBC 0.0 0  WBC    ABSOLUTE NRBC 0.00 0.00 - 7.59 K/uL   METABOLIC PANEL, BASIC    Collection Time: 03/05/22  4:42 AM   Result Value Ref Range    Sodium 135 (L) 136 - 145 mmol/L    Potassium 4.5 3.5 - 5.1 mmol/L    Chloride 106 97 - 108 mmol/L    CO2 24 21 - 32 mmol/L    Anion gap 5 5 - 15 mmol/L    Glucose 138 (H) 65 - 100 mg/dL    BUN 12 6 - 20 MG/DL    Creatinine 0.71 0.55 - 1.02 MG/DL    BUN/Creatinine ratio 17 12 - 20      GFR est AA >60 >60 ml/min/1.73m2    GFR est non-AA >60 >60 ml/min/1.73m2    Calcium 8.8 8.5 - 10.1 MG/DL   PHOSPHORUS    Collection Time: 03/05/22  4:42 AM   Result Value Ref Range    Phosphorus 4.1 2.6 - 4.7 MG/DL   MAGNESIUM    Collection Time: 03/05/22  4:42 AM   Result Value Ref Range    Magnesium 2.0 1.6 - 2.4 mg/dL       Microbiology Data:           CSF   Collected 3/4/2022 11:04     Status: Preliminary result    Specimen Information: Cerebrospinal Fluid         0 Result Notes     Ref Range & Units 3/4/22 1104 Resulting Agency   Special Requests:   NO SPECIAL REQUESTS P  [facility]   GRAM STAIN   1+ WBCS SEEN P  [facility]   GRAM STAIN   NO ORGANISMS SEEN P  [facility] [address]   GRAM STAIN   Smear Reviewed by Microbiology P  [address]   Culture result:   NO GROWTH THUS FAR HOLDING 7 DAYS P              MENINGITIS PATHOGENS PANEL, CSF (BY PCR) [[id number]] (Order [id number])  Microbiology  Date: 3/4/2022 Department: [facility] Released By/Authorizing: Donadl Gipson NP (auto-released)        Contains critical data MENINGITIS PATHOGENS PANEL, CSF (BY PCR)  Order: [id number]   Collected 3/4/2022 11:04     Status: Final result     0 Result Notes     Ref Range & Units 3/4/22 1104   Escherichia coli K1 NOTD   Not detected    Haemophilus Influenzae NOTD   Not detected    Listeria Monocytogenes NOTD   Not detected    Neisseria Meningitidis NOTD   Not detected    Streptococcus Agalactiae NOTD   Not detected    Streptococcus Pneumoniae NOTD   Not detected    Cytomegalovirus NOTD   Not detected    Enterovirus NOTD   Not detected    Herpes Simplex Virus 1 NOTD   Not detected    Comment:  In patients who have negative herpes simplex 1 and 2 PCR results, do not modify treatment, confirm with alternate testing. Herpes Simplex Virus 2 NOTD   Not detected    Comment: In patients who have negative herpes simplex 1 and 2 PCR results, do not modify treatment, confirm with alternate testing. Human Herpesvirus 6 NOTD   Not detected    Human Parechovirus NOTD   Not detected    Varicella Zoster Virus NOTD   Detected Panic     Comment: CALLED TO AND READ BACK BY   MS Vianca Reid 1, RN ON 3/4/22 AT 2013. MS    Crypto. neoformans/gattii NOTD   Not detected               Imaging:   Study Result    Narrative & Impression   EXAM: MRI CERV SPINE W WO CONT     INDICATION: Evaluate for MS.     COMPARISON: None     TECHNIQUE: MR imaging of the cervical spine was performed using the following  sequences: sagittal T1, T2, STIR;  axial T2, T1 prior to and following contrast  administration.      CONTRAST: 20 mL of Dotarem.     FINDINGS:     There is continuous T2 and STIR hyperintense signal shown within the central and  right side of the cord, extending from the inferior level of the medulla through  the C5 level, without cord enhancement abnormality. Distal cervical cord and  upper thoracic cord signal and enhancement appear normal. Vertebral body height,  bone signal and bone enhancement are normal. There is reversal of cervical  lordosis centered at C5 with otherwise anatomic alignment. No paraspinal soft  tissue mass is shown.      C2-C3: No herniation or stenosis.     C3-C4: Normal disc height. Mild rightward lateralizing diffuse disc bulging. No  facet arthropathy or canal-foraminal stenosis.     C4-C5: Mild disc space narrowing with minimal diffuse disc osteophyte complex  formation. No facet arthropathy or canal-foraminal stenosis.     C5-C6: Normal disc height with minimal central disc bulging. No facet  arthropathy or canal-foraminal stenosis.     C6-C7: Normal disc height with mild central and bilateral paracentral disc  bulging. No facet arthropathy or canal-foraminal stenosis.      C7-T1 and upper thoracic segments: No herniation or stenosis.     IMPRESSION  Extensive central and right-sided inferior medullary and cord signal abnormality  through C5 level. Differential diagnostic considerations include neuromyelitis  optica, acute disseminated encephalomyelitis, and other causes of transverse  myelitis. FINDINGS:  The ventricles are normal in size and position. There is no acute infarct,  hemorrhage, extra-axial fluid collection, or mass effect.     There is extensive abnormal FLAIR and T2 hyperintensity demonstrated at the  inferior medulla extending through the upper cervical cord which on accompanying  cervical spine MRI extends to the C5 level, without enhancement though with  restricted diffusion. Differential diagnostic considerations are discussed  below.     There is no cerebellar tonsillar herniation. Expected arterial flow-voids are  present. No evidence of abnormal enhancement.     The paranasal sinuses, mastoid air cells, and middle ears are clear. The orbital  contents are within normal limits. No significant osseous or scalp lesions are  identified.     IMPRESSION  Extensive continuous abnormality of signal involving the inferior medulla  through the cervical cord to the C5 level. Differential diagnostic  considerations include neuromyelitis optica, acute disseminated  encephalomyelitis, or transverse myelitis of other inflammatory causes. CT head, neck   FINDINGS:     CTA Head:  There is no evidence of large vessel occlusion or flow-limiting stenosis of the  intracranial internal carotid, anterior cerebral, and middle cerebral arteries. The anterior communicating artery is patent.      There is no evidence of large vessel occlusion or flow-limiting stenosis of the  intracranial vertebral arteries, basilar artery, or posterior cerebral arteries. The posterior communicating arteries are patent.      There is no evidence of aneurysm or vascular malformation.  The dural venous  sinuses and deep cerebral venous system are patent. No evidence of abnormal  enhancement on delayed phase images.     CTA NECK:  NASCET method was utilized for calculating stenosis.     The aortic arch is unremarkable. The common carotid arteries demonstrate no  significant stenosis. There is no evidence of significant stenosis in the  cervical right internal carotid artery. There is no evidence of significant  stenosis in the cervical left internal carotid artery.     There is a codominant vertebrobasilar arterial system. The cervical vertebral  arteries are normal in course, size and contour without significant stenosis.      Visualized soft tissues of the neck are unremarkable. Incidental 5 mm left  thyroid nodule. There is an 11 mm subpleural groundglass nodular opacity in the  anterior left upper lobe (series 3 image 10). No acute fracture or aggressive  osseous lesion. Reversal of the cervical lordosis.     CT Brain Perfusion:  There are no regional areas of elevated Tmax, decreased cerebral blood flow or  blood volume. rCBF < 30% = 0 cc. Tmax > 6 seconds = 0 cc.     IMPRESSION  CTA Head:  1. No evidence of significant stenosis or aneurysm.     CTA Neck:  1. No evidence of significant stenosis. 2. An 11 mm subpleural groundglass nodular opacity in the left upper lobe,  likely infectious or inflammatory. However, follow-up chest CT in 3 months is  recommended.     CT Brain Perfusion:  1. No acute abnormality.       Procedures:   LP   16 cc of clear spinal fluid was obtained in 4 tubes and sent to lab. Opening Pressure: was not measured due to patient condition.     CSF colorless, 0 RBC, 31 WBC, 94% lymh, 42 protein, 53 glucose  Varicella Zoster Virus NOTD   Detected Panic     Comment: CALLED TO AND READ BACK BY   MS TAMI RN ON 3/4/22 AT 2013.  MS        Assessment / Plan:      39y.o. 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 Hydrate well  Steroids have been used in vasculopathy induced by VZV for anti-inflammatory effects. Can likely de-escalate to p.o. prednisone but will defer to neurology  (usually done 3-5 days)  Upadacitinib has been on hold. I attempted to contact her rheumatologist--operators are unsure who is on-call for this team-I have also asked the patient to contact her rheumatologist on Monday and let primary team know  Usually disseminated VZV is airborne isolation. Airborne is stopped once lesions crusted. Patient does not have any skin vesicles or rash on her body on exam  At risk for dissemination and if any new rash develops, place on airborne isolation   At risk for other complications such as pneumonia/etc  Pending CSF culture, [place], lyme sent by other teams      2) Raynauds  3) RA Upadacitinib on hold for now, f/u wt rheum  4) partial hysterctomy for endometriosis  5) CT finding of  11 mm subpleural groundglass nodular opacity in the left upper lobe,  likely infectious or inflammatory. However, follow-up chest CT in 3 months is  recommended  6) STI screen offered for HIV, Hep C patient refused at this time       Thank for the opportunity to participate in the care of this patient. Please contact with questions or concerns.      Piyush Gaspar DO [place]  2:19 PM        Addendum:  Discussed post exposure prophylaxis with patient for contacts . She will have her parents ( less than 5 days since exposure) reach out to their Pcp for inquiring about vaccination   Her  likely exposed more than 5 days ago. He is not sick wt any lesions or illness per her.    She should discuss vaccination for herself as immunecompromised wt RA meds after acute stage of illness in future

## 2022-03-05 NOTE — PROGRESS NOTES
1106  Pt's heart rate ranging in 115-130s. Pt to be placed back on remote telemetry per Dr. Hermelindo Flores. Pt asymptomatic and states she's feeling ok. Will continue to monitor.

## 2022-03-05 NOTE — PROGRESS NOTES
Bedside and Verbal shift change report given to MARCK Garrido (oncoming nurse) by Leslie Avila. Shamika Isbell RN (offgoing nurse).  Report included the following information SBAR, Kardex, Intake/Output, MAR and Recent Results.

## 2022-03-05 NOTE — PROGRESS NOTES
1800  Pt being moved to negative pressure room for airborne precautions per ID RN and MD. Pt also requesting Dr. Aashish omer for order. 1945  Bedside and Verbal shift change report given to Sherri Aranda RN (oncoming nurse) by Tung Cole RN (offgoing nurse). Report included the following information SBAR, Kardex, Intake/Output, MAR, Recent Results and Med Rec Status.

## 2022-03-05 NOTE — PROGRESS NOTES
Physical Therapy    Consult received, chart reviewed. RN reports patient has been up ad mina with no concerns or limitations and see note from OT indicating patient declined need for their services. Patient received in room in shower with family member. Endorses no limitations with functional mobility and declines need for therapy at this time. Will complete order.     Aileen Strickland, MS, PT

## 2022-03-05 NOTE — PROGRESS NOTES
Called about patient CSF results. Varicella zoster detected in CSF. We will start the patient on acyclovir.

## 2022-03-05 NOTE — PROGRESS NOTES
Flako Mer[facility]  [address] [facility], [address] [place] [address]  [phone]      Medical Progress Note      NAME: Daquan Aponte   :  1985  MRM:  [id number]    Date/Time of service: 3/5/2022         Subjective:     Chief Complaint:  Patient was personally seen and examined by me during this time period. Chart reviewed. Some tachycardia noted. Continues to  endorse right facial numbness, improved weakness. Objective:       Vitals:       Last 24hrs VS reviewed since prior progress note. Most recent are:    Visit Vitals  /73 (BP 1 Location: Right upper arm, BP Patient Position: Prone)   Pulse (!) 120   Temp 98.9 °F (37.2 °C)   Resp 16   Ht 5' 3\" (1.6 m)   Wt 66.7 kg (147 lb)   SpO2 98%   BMI 26.04 kg/m²     SpO2 Readings from Last 6 Encounters:   22 98%            Intake/Output Summary (Last 24 hours) at 3/5/2022 1411  Last data filed at 3/5/2022 0738  Gross per 24 hour   Intake 1500 ml   Output 0 ml   Net 1500 ml        Exam:     Physical Exam:    Gen:  Well-developed, well-nourished, in no acute distress  HEENT:  Pink conjunctivae, PERRL, hearing intact to voice  Resp:  No accessory muscle use, clear breath sounds without wheezes rales or rhonchi  Card:   Tachycardia, No murmurs, normal S1, S2 without thrills, bruits or peripheral edema  Abd:  Soft, non-tender, non-distended, normoactive bowel sounds are present  Musc:  No cyanosis or clubbing  Skin:  No rashes   Neuro:  Right facial numbness, strength symmetrical, follows commands appropriately  Psych:  Good insight, oriented to person, place and time, alert    Medications Reviewed: (see below)    Lab Data Reviewed: (see below)    ______________________________________________________________________    Medications:     Current Facility-Administered Medications   Medication Dose Route Frequency    oxyCODONE-acetaminophen (PERCOCET) 5-325 mg per tablet 1 Tablet  1 Tablet Oral Q4H PRN    cilostazoL (PLETAL) tablet 100 mg  100 mg Oral ACB    [Held by provider] upadacitinib Tb24 15 mg (Patient Supplied)  15 mg Oral QHS    enoxaparin (LOVENOX) injection 40 mg  40 mg SubCUTAneous DAILY    methylPREDNISolone (Solu-MEDROL) 1 g in 100 mL NS MBP  1 g IntraVENous DAILY    acyclovir (ZOVIRAX) 650 mg in 0.9% sodium chloride 100 mL IVPB  10 mg/kg IntraVENous Q8H    acetaminophen (TYLENOL) tablet 650 mg  650 mg Oral Q4H PRN    Or    acetaminophen (TYLENOL) solution 650 mg  650 mg Per NG tube Q4H PRN    Or    acetaminophen (TYLENOL) suppository 650 mg  650 mg Rectal Q4H PRN    aspirin chewable tablet 81 mg  81 mg Oral DAILY    NIFEdipine ER (PROCARDIA XL) tablet 30 mg  30 mg Oral DAILY          Lab Review:     Recent Labs     03/05/22  0442 03/03/22  1621   WBC 7.6 10.8   HGB 10.6* 11.0*   HCT 32.3* 33.8*    348     Recent Labs     03/05/22  0442 03/03/22  1621   * 135*   K 4.5 3.8    105   CO2 24 26   * 88   BUN 12 16   CREA 0.71 0.95   CA 8.8 8.8   MG 2.0  --    PHOS 4.1  --    ALB  --  3.4*   TBILI  --  0.3   ALT  --  28   INR  --  1.1     Lab Results   Component Value Date/Time    Glucose (POC) 73 03/03/2022 05:40 PM    Glucose (POC) 63 (L) 03/03/2022 05:06 PM    Glucose (POC) 43 (LL) 03/03/2022 04:08 PM    Glucose (POC) 49 (LL) 03/03/2022 04:05 PM          Assessment / Plan:     40 yo hx of RA, presented w/ R facial numbness, R sided weakness, found to have inflammation from inferior medulla through C5, concerning for myelitis    Acute myelitis: likely due to varicella zoster infection. Continue high dose IV steroids. Neuro following. Acute encephalitis: due to Varicella zoster infection likely due to immunocompromised state. S/p LP. continue acyclovir. Continue steroids for now. Consult ID.      Tachycardia: Patient notes chronic and worked up prior but suspect worse with high dose steroids    RA: holding upadacitinib due to varicella zoster infection    Raynaud's: cont nifedipine, olivia    Total time spent with patient: 32 min **I personally saw and examined the patient during this time period**                 Care Plan discussed with: Patient, nursing, , ID     Discussed:  Care Plan    Prophylaxis:  Lovenox    Disposition:  Home w/Family           ___________________________________________________    Attending Physician: Emily Felton DO

## 2022-03-06 LAB
ANION GAP SERPL CALC-SCNC: 4 MMOL/L (ref 5–15)
ATRIAL RATE: 114 BPM
BASOPHILS # BLD: 0 K/UL (ref 0–0.1)
BASOPHILS NFR BLD: 0 % (ref 0–1)
BUN SERPL-MCNC: 14 MG/DL (ref 6–20)
BUN/CREAT SERPL: 18 (ref 12–20)
CALCIUM SERPL-MCNC: 8.5 MG/DL (ref 8.5–10.1)
CALCULATED P AXIS, ECG09: 47 DEGREES
CALCULATED R AXIS, ECG10: 20 DEGREES
CALCULATED T AXIS, ECG11: 38 DEGREES
CHLORIDE SERPL-SCNC: 107 MMOL/L (ref 97–108)
CO2 SERPL-SCNC: 25 MMOL/L (ref 21–32)
COMMENT, HOLDF: NORMAL
CREAT SERPL-MCNC: 0.79 MG/DL (ref 0.55–1.02)
DIAGNOSIS, 93000: NORMAL
DIFFERENTIAL METHOD BLD: ABNORMAL
EOSINOPHIL # BLD: 0 K/UL (ref 0–0.4)
EOSINOPHIL NFR BLD: 0 % (ref 0–7)
ERYTHROCYTE [DISTWIDTH] IN BLOOD BY AUTOMATED COUNT: 14.7 % (ref 11.5–14.5)
GLUCOSE SERPL-MCNC: 137 MG/DL (ref 65–100)
HCT VFR BLD AUTO: 32.7 % (ref 35–47)
HGB BLD-MCNC: 10.3 G/DL (ref 11.5–16)
IMM GRANULOCYTES # BLD AUTO: 0.2 K/UL (ref 0–0.04)
IMM GRANULOCYTES NFR BLD AUTO: 2 % (ref 0–0.5)
LYMPHOCYTES # BLD: 1.2 K/UL (ref 0.8–3.5)
LYMPHOCYTES NFR BLD: 10 % (ref 12–49)
MCH RBC QN AUTO: 27 PG (ref 26–34)
MCHC RBC AUTO-ENTMCNC: 31.5 G/DL (ref 30–36.5)
MCV RBC AUTO: 85.8 FL (ref 80–99)
MONOCYTES # BLD: 0.6 K/UL (ref 0–1)
MONOCYTES NFR BLD: 5 % (ref 5–13)
NEUTS SEG # BLD: 9.4 K/UL (ref 1.8–8)
NEUTS SEG NFR BLD: 83 % (ref 32–75)
NRBC # BLD: 0 K/UL (ref 0–0.01)
NRBC BLD-RTO: 0 PER 100 WBC
P-R INTERVAL, ECG05: 148 MS
PLATELET # BLD AUTO: 346 K/UL (ref 150–400)
PMV BLD AUTO: 8.8 FL (ref 8.9–12.9)
POTASSIUM SERPL-SCNC: 4.3 MMOL/L (ref 3.5–5.1)
Q-T INTERVAL, ECG07: 322 MS
QRS DURATION, ECG06: 64 MS
QTC CALCULATION (BEZET), ECG08: 443 MS
RBC # BLD AUTO: 3.81 M/UL (ref 3.8–5.2)
SAMPLES BEING HELD,HOLD: NORMAL
SODIUM SERPL-SCNC: 136 MMOL/L (ref 136–145)
VENTRICULAR RATE, ECG03: 114 BPM
WBC # BLD AUTO: 11.4 K/UL (ref 3.6–11)

## 2022-03-06 PROCEDURE — 74011000258 HC RX REV CODE- 258: Performed by: NURSE PRACTITIONER

## 2022-03-06 PROCEDURE — 74011250636 HC RX REV CODE- 250/636: Performed by: NURSE PRACTITIONER

## 2022-03-06 PROCEDURE — 36415 COLL VENOUS BLD VENIPUNCTURE: CPT

## 2022-03-06 PROCEDURE — 74011000250 HC RX REV CODE- 250: Performed by: INTERNAL MEDICINE

## 2022-03-06 PROCEDURE — 65660000000 HC RM CCU STEPDOWN

## 2022-03-06 PROCEDURE — 74011250637 HC RX REV CODE- 250/637: Performed by: INTERNAL MEDICINE

## 2022-03-06 PROCEDURE — 85025 COMPLETE CBC W/AUTO DIFF WBC: CPT

## 2022-03-06 PROCEDURE — 96372 THER/PROPH/DIAG INJ SC/IM: CPT

## 2022-03-06 PROCEDURE — 74011250637 HC RX REV CODE- 250/637: Performed by: HOSPITALIST

## 2022-03-06 PROCEDURE — 74011000258 HC RX REV CODE- 258: Performed by: INTERNAL MEDICINE

## 2022-03-06 PROCEDURE — 80048 BASIC METABOLIC PNL TOTAL CA: CPT

## 2022-03-06 PROCEDURE — 74011250636 HC RX REV CODE- 250/636: Performed by: INTERNAL MEDICINE

## 2022-03-06 PROCEDURE — 99232 SBSQ HOSP IP/OBS MODERATE 35: CPT | Performed by: PSYCHIATRY & NEUROLOGY

## 2022-03-06 PROCEDURE — 65270000029 HC RM PRIVATE

## 2022-03-06 RX ORDER — IBUPROFEN 400 MG/1
800 TABLET ORAL
Status: DISCONTINUED | OUTPATIENT
Start: 2022-03-06 | End: 2022-03-09 | Stop reason: HOSPADM

## 2022-03-06 RX ORDER — IBUPROFEN 400 MG/1
400 TABLET ORAL ONCE
Status: COMPLETED | OUTPATIENT
Start: 2022-03-06 | End: 2022-03-06

## 2022-03-06 RX ORDER — LIDOCAINE 40 MG/G
CREAM TOPICAL
Status: DISCONTINUED | OUTPATIENT
Start: 2022-03-06 | End: 2022-03-06

## 2022-03-06 RX ORDER — LIDOCAINE 40 MG/G
CREAM TOPICAL 3 TIMES DAILY
Status: DISCONTINUED | OUTPATIENT
Start: 2022-03-06 | End: 2022-03-09 | Stop reason: HOSPADM

## 2022-03-06 RX ADMIN — OXYCODONE AND ACETAMINOPHEN 1 TABLET: 5; 325 TABLET ORAL at 18:02

## 2022-03-06 RX ADMIN — SODIUM CHLORIDE 1000 MG: 900 INJECTION INTRAVENOUS at 10:28

## 2022-03-06 RX ADMIN — POLYETHYLENE GLYCOL 3350 17 G: 17 POWDER, FOR SOLUTION ORAL at 08:04

## 2022-03-06 RX ADMIN — LIDOCAINE 4%: 4 CREAM TOPICAL at 16:00

## 2022-03-06 RX ADMIN — ACYCLOVIR SODIUM 650 MG: 50 INJECTION, SOLUTION INTRAVENOUS at 21:56

## 2022-03-06 RX ADMIN — ASPIRIN 81 MG: 81 TABLET, CHEWABLE ORAL at 08:04

## 2022-03-06 RX ADMIN — ACYCLOVIR SODIUM 650 MG: 50 INJECTION, SOLUTION INTRAVENOUS at 06:22

## 2022-03-06 RX ADMIN — ENOXAPARIN SODIUM 40 MG: 100 INJECTION SUBCUTANEOUS at 08:04

## 2022-03-06 RX ADMIN — IBUPROFEN 400 MG: 400 TABLET, FILM COATED ORAL at 06:16

## 2022-03-06 RX ADMIN — IBUPROFEN 800 MG: 400 TABLET, FILM COATED ORAL at 18:02

## 2022-03-06 RX ADMIN — ACYCLOVIR SODIUM 650 MG: 50 INJECTION, SOLUTION INTRAVENOUS at 14:13

## 2022-03-06 RX ADMIN — LIDOCAINE 4%: 4 CREAM TOPICAL at 12:00

## 2022-03-06 RX ADMIN — CILOSTAZOL 100 MG: 100 TABLET ORAL at 06:17

## 2022-03-06 RX ADMIN — SODIUM CHLORIDE 100 ML/HR: 9 INJECTION, SOLUTION INTRAVENOUS at 06:16

## 2022-03-06 RX ADMIN — SODIUM CHLORIDE 75 ML/HR: 9 INJECTION, SOLUTION INTRAVENOUS at 22:01

## 2022-03-06 RX ADMIN — OXYCODONE AND ACETAMINOPHEN 1 TABLET: 5; 325 TABLET ORAL at 03:18

## 2022-03-06 RX ADMIN — NIFEDIPINE 30 MG: 30 TABLET, FILM COATED, EXTENDED RELEASE ORAL at 21:56

## 2022-03-06 RX ADMIN — OXYCODONE AND ACETAMINOPHEN 1 TABLET: 5; 325 TABLET ORAL at 22:28

## 2022-03-06 RX ADMIN — OXYCODONE AND ACETAMINOPHEN 1 TABLET: 5; 325 TABLET ORAL at 09:12

## 2022-03-06 NOTE — PROGRESS NOTES
Flako Merida [facility] 79  [address] [facility], [address] [place] [address]  [phone]      Medical Progress Note      NAME: Primitivo Jacinto   :  1985  MRM:  [id number]    Date/Time of service: 3/6/2022         Subjective:     Chief Complaint:  Patient was personally seen and examined by me during this time period. Chart reviewed. Improved tachycadia. Improved right facial numbness, improved weakness. Notes knee swelling and pain. Objective:       Vitals:       Last 24hrs VS reviewed since prior progress note. Most recent are:    Visit Vitals  /68 (BP 1 Location: Left upper arm, BP Patient Position: At rest)   Pulse (!) 101   Temp 98.3 °F (36.8 °C)   Resp 18   Ht 5' 3\" (1.6 m)   Wt 66.7 kg (147 lb)   SpO2 100%   BMI 26.04 kg/m²     SpO2 Readings from Last 6 Encounters:   22 100%            Intake/Output Summary (Last 24 hours) at 3/6/2022 1026  Last data filed at 3/6/2022 0805  Gross per 24 hour   Intake 2065 ml   Output 0 ml   Net 2065 ml        Exam:     Physical Exam:    Gen:  Well-developed, well-nourished, in no acute distress  HEENT:  Pink conjunctivae, PERRL, hearing intact to voice  Resp:  No accessory muscle use, clear breath sounds without wheezes rales or rhonchi  Card:   Tachycardia, No murmurs, normal S1, S2 without thrills, bruits or peripheral edema  Abd:  Soft, non-tender, non-distended, normoactive bowel sounds are present  Musc:  B/l knee swelling   Skin:  No rashes  Neuro:  strength symmetrical, follows commands appropriately  Psych:  Good insight, oriented to person, place and time, alert    Medications Reviewed: (see below)    Lab Data Reviewed: (see below)    ______________________________________________________________________    Medications:     Current Facility-Administered Medications   Medication Dose Route Frequency    lidocaine (XYLOCAINE) 4 % cream   Topical TID    ibuprofen (MOTRIN) tablet 800 mg  800 mg Oral BID PRN    polyethylene glycol (MIRALAX) packet 17 g  17 g Oral DAILY    0.9% sodium chloride infusion  75 mL/hr IntraVENous CONTINUOUS    polyethylene glycol (MIRALAX) packet 17 g  17 g Oral DAILY    oxyCODONE-acetaminophen (PERCOCET) 5-325 mg per tablet 1 Tablet  1 Tablet Oral Q4H PRN    cilostazoL (PLETAL) tablet 100 mg  100 mg Oral ACB    [Held by provider] upadacitinib Tb24 15 mg (Patient Supplied)  15 mg Oral QHS    enoxaparin (LOVENOX) injection 40 mg  40 mg SubCUTAneous DAILY    methylPREDNISolone (Solu-MEDROL) 1 g in 100 mL NS MBP  1 g IntraVENous DAILY    acyclovir (ZOVIRAX) 650 mg in 0.9% sodium chloride 100 mL IVPB  10 mg/kg IntraVENous Q8H    acetaminophen (TYLENOL) tablet 650 mg  650 mg Oral Q4H PRN    Or    acetaminophen (TYLENOL) solution 650 mg  650 mg Per NG tube Q4H PRN    Or    acetaminophen (TYLENOL) suppository 650 mg  650 mg Rectal Q4H PRN    aspirin chewable tablet 81 mg  81 mg Oral DAILY    NIFEdipine ER (PROCARDIA XL) tablet 30 mg  30 mg Oral DAILY          Lab Review:     Recent Labs     03/06/22 0318 03/05/22  0442 03/03/22  1621   WBC 11.4* 7.6 10.8   HGB 10.3* 10.6* 11.0*   HCT 32.7* 32.3* 33.8*    369 348     Recent Labs     03/06/22 0318 03/05/22 0442 03/03/22  1621    135* 135*   K 4.3 4.5 3.8    106 105   CO2 25 24 26   * 138* 88   BUN 14 12 16   CREA 0.79 0.71 0.95   CA 8.5 8.8 8.8   MG  --  2.0  --    PHOS  --  4.1  --    ALB  --   --  3.4*   TBILI  --   --  0.3   ALT  --   --  28   INR  --   --  1.1     Lab Results   Component Value Date/Time    Glucose (POC) 73 03/03/2022 05:40 PM    Glucose (POC) 63 (L) 03/03/2022 05:06 PM    Glucose (POC) 43 (LL) 03/03/2022 04:08 PM    Glucose (POC) 49 (LL) 03/03/2022 04:05 PM          Assessment / Plan:     40 yo hx of RA, presented w/ R facial numbness, R sided weakness, found to have inflammation from inferior medulla through C5, concerning for myelitis    Acute myelitis: likely due to varicella zoster infection.   Continue high dose IV steroids. Neuro following. 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 S/p LP. continue acyclovir. Continue steroids for now. Continue precautions. ID following. Tachycardia: Patient notes chronic and worked up prior but suspect worse with high dose steroids. Monitor     RA: holding upadacitinib due to varicella zoster infection. Percocet Prn and motrin PRN and lidocaine to treat symptom flare while holding upadacitinib.     Raynaud's: cont nifedipine, pleta    Total time spent with patient: 32 min **I personally saw and examined the patient during this time period**                 Care Plan discussed with: Patient, nursing,     Discussed:  Care Plan    Prophylaxis:  Lovenox    Disposition:  Home w/Family           ___________________________________________________    Attending Physician: Bear Hdz DO

## 2022-03-06 NOTE — PROGRESS NOTES
Neurology Progress Note    Patient ID:  Michael Valencia  [id number]  39 y.o.  1985    CC: right face, neck and arm paresthesia    Subjective:      Patient reports less intense right face, neck and arm paresthesia. Labs done revealed increase WBC 11. 4 (steroid induced), low hgb at 10.3 and increase glucose at 137. Current Facility-Administered Medications   Medication Dose Route Frequency    lidocaine (XYLOCAINE) 4 % cream   Topical TID PRN    polyethylene glycol (MIRALAX) packet 17 g  17 g Oral DAILY    0.9% sodium chloride infusion  100 mL/hr IntraVENous CONTINUOUS    polyethylene glycol (MIRALAX) packet 17 g  17 g Oral DAILY    oxyCODONE-acetaminophen (PERCOCET) 5-325 mg per tablet 1 Tablet  1 Tablet Oral Q4H PRN    cilostazoL (PLETAL) tablet 100 mg  100 mg Oral ACB    [Held by provider] upadacitinib Tb24 15 mg (Patient Supplied)  15 mg Oral QHS    enoxaparin (LOVENOX) injection 40 mg  40 mg SubCUTAneous DAILY    methylPREDNISolone (Solu-MEDROL) 1 g in 100 mL NS MBP  1 g IntraVENous DAILY    acyclovir (ZOVIRAX) 650 mg in 0.9% sodium chloride 100 mL IVPB  10 mg/kg IntraVENous Q8H    acetaminophen (TYLENOL) tablet 650 mg  650 mg Oral Q4H PRN    Or    acetaminophen (TYLENOL) solution 650 mg  650 mg Per NG tube Q4H PRN    Or    acetaminophen (TYLENOL) suppository 650 mg  650 mg Rectal Q4H PRN    aspirin chewable tablet 81 mg  81 mg Oral DAILY    NIFEdipine ER (PROCARDIA XL) tablet 30 mg  30 mg Oral DAILY        Review of Systems:    Pertinent items are noted in HPI. Objective:     Patient Vitals for the past 8 hrs:   BP Temp Pulse Resp SpO2   03/06/22 0811 115/68 98.3 °F (36.8 °C) (!) 101 18 100 %   03/06/22 0700 -- -- 81 -- --   03/06/22 0317 113/65 97.9 °F (36.6 °C) 83 18 100 %       03/06 0701 - 03/06 1900  In: 100 [P.O.:100]  Out: 0   03/04 1901 - 03/06 0700  In: 2655 [P.O.:800;  I.V.:1445]  Out: 0     Lab Review   Recent Results (from the past 24 hour(s))   CBC WITH AUTOMATED DIFF Collection Time: 03/06/22  3:18 AM   Result Value Ref Range    WBC 11.4 (H) 3.6 - 11.0 K/uL    RBC 3.81 3.80 - 5.20 M/uL    HGB 10.3 (L) 11.5 - 16.0 g/dL    HCT 32.7 (L) 35.0 - 47.0 %    MCV 85.8 80.0 - 99.0 FL    MCH 27.0 26.0 - 34.0 PG    MCHC 31.5 30.0 - 36.5 g/dL    RDW 14.7 (H) 11.5 - 14.5 %    PLATELET 523 181 - 472 K/uL    MPV 8.8 (L) 8.9 - 12.9 FL    NRBC 0.0 0  WBC    ABSOLUTE NRBC 0.00 0.00 - 0.01 K/uL    NEUTROPHILS 83 (H) 32 - 75 %    LYMPHOCYTES 10 (L) 12 - 49 %    MONOCYTES 5 5 - 13 %    EOSINOPHILS 0 0 - 7 %    BASOPHILS 0 0 - 1 %    IMMATURE GRANULOCYTES 2 (H) 0.0 - 0.5 %    ABS. NEUTROPHILS 9.4 (H) 1.8 - 8.0 K/UL    ABS. LYMPHOCYTES 1.2 0.8 - 3.5 K/UL    ABS. MONOCYTES 0.6 0.0 - 1.0 K/UL    ABS. EOSINOPHILS 0.0 0.0 - 0.4 K/UL    ABS. BASOPHILS 0.0 0.0 - 0.1 K/UL    ABS. IMM. GRANS. 0.2 (H) 0.00 - 0.04 K/UL    DF AUTOMATED     METABOLIC PANEL, BASIC    Collection Time: 03/06/22  3:18 AM   Result Value Ref Range    Sodium 136 136 - 145 mmol/L    Potassium 4.3 3.5 - 5.1 mmol/L    Chloride 107 97 - 108 mmol/L    CO2 25 21 - 32 mmol/L    Anion gap 4 (L) 5 - 15 mmol/L    Glucose 137 (H) 65 - 100 mg/dL    BUN 14 6 - 20 MG/DL    Creatinine 0.79 0.55 - 1.02 MG/DL    BUN/Creatinine ratio 18 12 - 20      GFR est AA >60 >60 ml/min/1.73m2    GFR est non-AA >60 >60 ml/min/1.73m2    Calcium 8.5 8.5 - 10.1 MG/DL     PHYSICAL EXAM:     NEUROLOGICAL EXAM:     Appearance: The patient is well developed, well nourished, provides a coherent history and is in no acute distress. Mental Status: Oriented to time, place and person. Fluent, no aphasia or dysarthria. Mood and affect appropriate. Cranial Nerves:   Intact visual fields. MOY, EOM's full, no nystagmus, no ptosis. Decrease cold right face. Corneal reflexes are intact. Facial movement is symmetric. Hearing is normal bilaterally. Palate is midline with normal elevation. Sternocleidomastoid and trapezius muscles are normal. Tongue is midline.    Motor: 5/5 strength in upper and lower proximal and distal muscles. Normal bulk and tone. No fasciculations. No pronator drift. Reflexes:   Deep tendon reflexes 1+/4 and symmetrical. Downgoing toes. Sensory:   Normal to cold right neck and RUE. Gait:  Not tested. Tremor:   No tremor noted. Cerebellar:  Intact FTN/CUATE/HTS. Assessment:   Acute myelitis  Acute encephalitis  Varicella zoster infection    Plan:   Neurological examination reveals decreased sensation right face neck and right upper extremity. Patient currently with acute myelitis/encephalitis secondary to currently to varicella-zoster infection. Likely brought about by her immunocompromised state due to treatments for her rheumatoid arthritis.     Head CT without contrast did not reveal any acute process. Brain MRI with and without contrast did not reveal any abnormal enhancements. Extensive abnormal FLAIR and T2 hyperintensity at the inferior medulla extending through the upper cord which on accompanying cervical spine MRI extends to C5 level. No enhancement but with restricted diffusion.     Cervical spine MRI with and without contrast again revealed continuous T2 and steer hyperintense signal shown within the central and right side of the cord extending from the inferior level of the medulla through the C5 level without cord enhancement abnormality. Reversal of cervical lordosis.     Head and neck CTA did not reveal any flow-limiting stenosis or aneurysm. No ICA stenosis. CT perfusion study did not reveal any acute abnormality.     Lumbar puncture was done which revealed cell count mainly WBC predominantly lymphocytic but normal glucose and protein. Meningitis panel was positive for varicella-zoster. Other CSF studies are pending.     NMO antibody testing is pending.       Appreciate infectious disease input. Continue IV acyclovir. Finish 5 day course of 1000 mg IV Solu-Medrol.  Patient interested in continue infusion at home through a PICC line. Hospitalist informed.       May need to discuss with her rheumatologist with regards to her immunosuppressive therapy. Follow up with Dr. Zo Lewis (neurology) 1 month after discharge.     Signed:  Cynthia Dyson MD  3/6/2022  10:07 AM

## 2022-03-06 NOTE — PROGRESS NOTES
Cross Cover ID  Chart checked   Renal function stable   Continue IV acyclovir     Donita Araiza,   3:04 PM

## 2022-03-06 NOTE — PROGRESS NOTES
7621: Patient reports increased swelling in her knees from not taking her RA meds while here. She requested ibuprofen to help with the inflammation. RN called Dr. Bean Paniagua MD ordered a one time dose of ibuprofen 400mg PO.     0720: Bedside and Verbal shift change report given to [facility] (oncoming nurse) by Maciej Carr RN (offgoing nurse). Report included the following information SBAR, Kardex, Intake/Output, MAR, Accordion, Recent Results, Med Rec Status and Cardiac Rhythm sinus tachy.

## 2022-03-07 LAB
ANION GAP SERPL CALC-SCNC: 3 MMOL/L (ref 5–15)
AQP4 H2O CHANNEL AB SERPL IA-ACNC: <1.5 U/ML (ref 0–3)
BASOPHILS # BLD: 0 K/UL (ref 0–0.1)
BASOPHILS NFR BLD: 0 % (ref 0–1)
BUN SERPL-MCNC: 16 MG/DL (ref 6–20)
BUN/CREAT SERPL: 24 (ref 12–20)
CALCIUM SERPL-MCNC: 7.4 MG/DL (ref 8.5–10.1)
CHLORIDE SERPL-SCNC: 110 MMOL/L (ref 97–108)
CO2 SERPL-SCNC: 25 MMOL/L (ref 21–32)
CREAT SERPL-MCNC: 0.67 MG/DL (ref 0.55–1.02)
CRYPTOC AG CSF QL LA: NEGATIVE
DIFFERENTIAL METHOD BLD: ABNORMAL
EOSINOPHIL # BLD: 0 K/UL (ref 0–0.4)
EOSINOPHIL NFR BLD: 0 % (ref 0–7)
ERYTHROCYTE [DISTWIDTH] IN BLOOD BY AUTOMATED COUNT: 14.8 % (ref 11.5–14.5)
GLUCOSE SERPL-MCNC: 125 MG/DL (ref 65–100)
HCT VFR BLD AUTO: 29.6 % (ref 35–47)
HGB BLD-MCNC: 9.5 G/DL (ref 11.5–16)
IMM GRANULOCYTES # BLD AUTO: 0 K/UL
IMM GRANULOCYTES NFR BLD AUTO: 0 %
LYMPHOCYTES # BLD: 1.5 K/UL (ref 0.8–3.5)
LYMPHOCYTES NFR BLD: 10 % (ref 12–49)
MCH RBC QN AUTO: 27.1 PG (ref 26–34)
MCHC RBC AUTO-ENTMCNC: 32.1 G/DL (ref 30–36.5)
MCV RBC AUTO: 84.6 FL (ref 80–99)
METAMYELOCYTES NFR BLD MANUAL: 2 %
MONOCYTES # BLD: 0.3 K/UL (ref 0–1)
MONOCYTES NFR BLD: 2 % (ref 5–13)
NEUTS SEG # BLD: 13 K/UL (ref 1.8–8)
NEUTS SEG NFR BLD: 86 % (ref 32–75)
NRBC # BLD: 0.02 K/UL (ref 0–0.01)
NRBC BLD-RTO: 0.1 PER 100 WBC
PLATELET # BLD AUTO: 332 K/UL (ref 150–400)
PMV BLD AUTO: 8.9 FL (ref 8.9–12.9)
POTASSIUM SERPL-SCNC: 3.9 MMOL/L (ref 3.5–5.1)
RBC # BLD AUTO: 3.5 M/UL (ref 3.8–5.2)
RBC MORPH BLD: ABNORMAL
REAGIN AB CSF QL: NON REACTIVE
REFLEX TO CULTURE, CRAFRT: NORMAL
SODIUM SERPL-SCNC: 138 MMOL/L (ref 136–145)
WBC # BLD AUTO: 15.1 K/UL (ref 3.6–11)
WNV IGG SPEC QL IA: POSITIVE
WNV IGM CSF QL IA: NEGATIVE

## 2022-03-07 PROCEDURE — 74011250637 HC RX REV CODE- 250/637: Performed by: INTERNAL MEDICINE

## 2022-03-07 PROCEDURE — 74011250636 HC RX REV CODE- 250/636: Performed by: INTERNAL MEDICINE

## 2022-03-07 PROCEDURE — 96372 THER/PROPH/DIAG INJ SC/IM: CPT

## 2022-03-07 PROCEDURE — 65660000000 HC RM CCU STEPDOWN

## 2022-03-07 PROCEDURE — 36415 COLL VENOUS BLD VENIPUNCTURE: CPT

## 2022-03-07 PROCEDURE — 99232 SBSQ HOSP IP/OBS MODERATE 35: CPT | Performed by: INTERNAL MEDICINE

## 2022-03-07 PROCEDURE — 74011000258 HC RX REV CODE- 258: Performed by: INTERNAL MEDICINE

## 2022-03-07 PROCEDURE — 74011250636 HC RX REV CODE- 250/636: Performed by: NURSE PRACTITIONER

## 2022-03-07 PROCEDURE — 65270000029 HC RM PRIVATE

## 2022-03-07 PROCEDURE — 74011250637 HC RX REV CODE- 250/637: Performed by: HOSPITALIST

## 2022-03-07 PROCEDURE — 85025 COMPLETE CBC W/AUTO DIFF WBC: CPT

## 2022-03-07 PROCEDURE — 74011000258 HC RX REV CODE- 258: Performed by: NURSE PRACTITIONER

## 2022-03-07 PROCEDURE — 99232 SBSQ HOSP IP/OBS MODERATE 35: CPT | Performed by: PSYCHIATRY & NEUROLOGY

## 2022-03-07 PROCEDURE — 80048 BASIC METABOLIC PNL TOTAL CA: CPT

## 2022-03-07 RX ORDER — HYDROMORPHONE HYDROCHLORIDE 1 MG/ML
0.2 INJECTION, SOLUTION INTRAMUSCULAR; INTRAVENOUS; SUBCUTANEOUS
Status: DISCONTINUED | OUTPATIENT
Start: 2022-03-07 | End: 2022-03-08

## 2022-03-07 RX ADMIN — POLYETHYLENE GLYCOL 3350 17 G: 17 POWDER, FOR SOLUTION ORAL at 08:16

## 2022-03-07 RX ADMIN — SODIUM CHLORIDE 75 ML/HR: 9 INJECTION, SOLUTION INTRAVENOUS at 16:20

## 2022-03-07 RX ADMIN — SODIUM CHLORIDE 1000 MG: 900 INJECTION INTRAVENOUS at 11:32

## 2022-03-07 RX ADMIN — ASPIRIN 81 MG: 81 TABLET, CHEWABLE ORAL at 08:16

## 2022-03-07 RX ADMIN — LIDOCAINE 4%: 4 CREAM TOPICAL at 11:32

## 2022-03-07 RX ADMIN — ENOXAPARIN SODIUM 40 MG: 100 INJECTION SUBCUTANEOUS at 08:16

## 2022-03-07 RX ADMIN — OXYCODONE AND ACETAMINOPHEN 1 TABLET: 5; 325 TABLET ORAL at 09:38

## 2022-03-07 RX ADMIN — HYDROMORPHONE HYDROCHLORIDE 0.2 MG: 1 INJECTION, SOLUTION INTRAMUSCULAR; INTRAVENOUS; SUBCUTANEOUS at 12:43

## 2022-03-07 RX ADMIN — OXYCODONE AND ACETAMINOPHEN 1 TABLET: 5; 325 TABLET ORAL at 02:59

## 2022-03-07 RX ADMIN — HYDROMORPHONE HYDROCHLORIDE 0.2 MG: 1 INJECTION, SOLUTION INTRAMUSCULAR; INTRAVENOUS; SUBCUTANEOUS at 18:14

## 2022-03-07 RX ADMIN — POLYETHYLENE GLYCOL 3350 17 G: 17 POWDER, FOR SOLUTION ORAL at 08:17

## 2022-03-07 RX ADMIN — LIDOCAINE 4%: 4 CREAM TOPICAL at 08:17

## 2022-03-07 RX ADMIN — IBUPROFEN 800 MG: 400 TABLET, FILM COATED ORAL at 06:33

## 2022-03-07 RX ADMIN — ACYCLOVIR SODIUM 650 MG: 50 INJECTION, SOLUTION INTRAVENOUS at 14:32

## 2022-03-07 RX ADMIN — ACYCLOVIR SODIUM 650 MG: 50 INJECTION, SOLUTION INTRAVENOUS at 21:49

## 2022-03-07 RX ADMIN — CILOSTAZOL 100 MG: 100 TABLET ORAL at 06:27

## 2022-03-07 RX ADMIN — OXYCODONE AND ACETAMINOPHEN 1 TABLET: 5; 325 TABLET ORAL at 21:47

## 2022-03-07 RX ADMIN — ACYCLOVIR SODIUM 650 MG: 50 INJECTION, SOLUTION INTRAVENOUS at 06:27

## 2022-03-07 RX ADMIN — OXYCODONE AND ACETAMINOPHEN 1 TABLET: 5; 325 TABLET ORAL at 15:26

## 2022-03-07 RX ADMIN — NIFEDIPINE 30 MG: 30 TABLET, FILM COATED, EXTENDED RELEASE ORAL at 21:47

## 2022-03-07 NOTE — PROGRESS NOTES
12:10 PM  Notified Dr. Dar Dunbar of pt's request for pain meds d/t 10/10 pain in head and neck and increased HR of 118. Awaiting response.

## 2022-03-07 NOTE — PROGRESS NOTES
Flako Merida [facility] [address]  [address] [facility], [address] [place] [address]  [phone]      Medical Progress Note      NAME: Gricelda Richardson   :  1985  MRM:  [id number]    Date/Time of service: 3/7/2022         Subjective:     Chief Complaint:  Patient was personally seen and examined by me during this time period. Chart reviewed. States right facial numbness/pain worse today, continues to have weakness of RUE. Notes knee swelling and pain. Discussed plan of care with ID at bedside. Objective:       Vitals:       Last 24hrs VS reviewed since prior progress note. Most recent are:    Visit Vitals  /62 (BP 1 Location: Left upper arm, BP Patient Position: At rest)   Pulse (!) 118   Temp 98.8 °F (37.1 °C)   Resp 18   Ht 5' 3\" (1.6 m)   Wt 66.7 kg (147 lb)   SpO2 98%   BMI 26.04 kg/m²     SpO2 Readings from Last 6 Encounters:   22 98%            Intake/Output Summary (Last 24 hours) at 3/7/2022 1236  Last data filed at 3/7/2022 0413  Gross per 24 hour   Intake 992.5 ml   Output --   Net 992.5 ml        Exam:     Physical Exam:    Gen:  Well-developed, well-nourished, in no acute distress  HEENT:  Pink conjunctivae, PERRL, hearing intact to voice  Resp:  No accessory muscle use, clear breath sounds without wheezes rales or rhonchi  Card:   Tachycardia, No murmurs, normal S1, S2 without thrills, bruits or peripheral edema  Abd:  Soft, non-tender, non-distended, normoactive bowel sounds are present  Musc:  B/l knee swelling   Skin:  No rashes  Neuro:  strength symmetrical, follows commands appropriately  Psych:  Good insight, oriented to person, place and time, alert    Medications Reviewed: (see below)    Lab Data Reviewed: (see below)    ______________________________________________________________________    Medications:     Current Facility-Administered Medications   Medication Dose Route Frequency    HYDROmorphone (DILAUDID) syringe 0.2 mg  0.2 mg IntraVENous Q4H PRN    lidocaine (XYLOCAINE) 4 % cream   Topical TID    ibuprofen (MOTRIN) tablet 800 mg  800 mg Oral BID PRN    polyethylene glycol (MIRALAX) packet 17 g  17 g Oral DAILY    0.9% sodium chloride infusion  75 mL/hr IntraVENous CONTINUOUS    polyethylene glycol (MIRALAX) packet 17 g  17 g Oral DAILY    oxyCODONE-acetaminophen (PERCOCET) 5-325 mg per tablet 1 Tablet  1 Tablet Oral Q4H PRN    cilostazoL (PLETAL) tablet 100 mg  100 mg Oral ACB    [Held by provider] upadacitinib Tb24 15 mg (Patient Supplied)  15 mg Oral QHS    enoxaparin (LOVENOX) injection 40 mg  40 mg SubCUTAneous DAILY    methylPREDNISolone (Solu-MEDROL) 1 g in 100 mL NS MBP  1 g IntraVENous DAILY    acyclovir (ZOVIRAX) 650 mg in 0.9% sodium chloride 100 mL IVPB  10 mg/kg IntraVENous Q8H    acetaminophen (TYLENOL) tablet 650 mg  650 mg Oral Q4H PRN    Or    acetaminophen (TYLENOL) solution 650 mg  650 mg Per NG tube Q4H PRN    Or    acetaminophen (TYLENOL) suppository 650 mg  650 mg Rectal Q4H PRN    aspirin chewable tablet 81 mg  81 mg Oral DAILY    NIFEdipine ER (PROCARDIA XL) tablet 30 mg  30 mg Oral DAILY          Lab Review:     Recent Labs     03/07/22  0126 03/06/22  0318 03/05/22  0442   WBC 15.1* 11.4* 7.6   HGB 9.5* 10.3* 10.6*   HCT 29.6* 32.7* 32.3*    346 369     Recent Labs     03/07/22  0126 03/06/22  0318 03/05/22  0442    136 135*   K 3.9 4.3 4.5   * 107 106   CO2 25 25 24   * 137* 138*   BUN 16 14 12   CREA 0.67 0.79 0.71   CA 7.4* 8.5 8.8   MG  --   --  2.0   PHOS  --   --  4.1     Lab Results   Component Value Date/Time    Glucose (POC) 73 03/03/2022 05:40 PM    Glucose (POC) 63 (L) 03/03/2022 05:06 PM    Glucose (POC) 43 (LL) 03/03/2022 04:08 PM    Glucose (POC) 49 (LL) 03/03/2022 04:05 PM          Assessment / Plan:     40 yo hx of RA, presented w/ R facial numbness, R sided weakness, found to have inflammation from inferior medulla through C5, concerning for myelitis    Acute myelitis/ RUE Pain and Weakness: likely due to varicella zoster infection. Continue high dose IV steroids. Neuro following. IV dilaudid PRN. 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 S/p LP. continue acyclovir. Continue steroids for now. Continue precautions. ID following. Plan for PICC placement with DC on IV acyclovir. Tachycardia: Patient notes chronic and worked up prior but suspect worse with high dose steroids. Monitor     RA: holding upadacitinib due to varicella zoster infection. Percocet Prn and motrin PRN and dilaudid PRN and lidocaine to treat symptom flare while holding upadacitinib.     Raynaud's: cont nifedipine, pleta    Total time spent with patient: 31 min **I personally saw and examined the patient during this time period**                 Care Plan discussed with: Patient, nursing, ID     Discussed:  Care Plan    Prophylaxis:  Lovenox    Disposition:  Home w/Family           ___________________________________________________    Attending Physician: Jennifer Acuna DO

## 2022-03-07 NOTE — PROGRESS NOTES
3/7/2022  2:26 PM  Pt discussed in IDR rounds, is continuing to require medical management for Acute myelitis/ RUE Pain and Weakness, Acute Varicella Zoster Meningoencephalitis, tachycardia, RA, Raynaud's   Transitions of Care Plan:  RUR 4 % Low Risk of Readmission/Green  LOS 3 Days  1. Medical management continues  2. Neurology following   3. ID following, on acyclovir, Plan for PICC placement with DC on IV acyclovir, plan to keep pt in house until Wednesday   4. CM to follow through for treatment/response, will arrange for IV Acyclovir, await order  5. PT,OT completed orders, no skilled services at DC   6. DC when sable to home, pt lives w/ spouse   7. Outpatient f/u PCP, specialists  8.  Family to transport at [address] [place] [address]   '

## 2022-03-07 NOTE — PROGRESS NOTES
Neurology Progress Note    Patient ID:  Tricia Proctor  [id number]  39 y.o.  1985      CC: right face, neck and arm paresthesia    Subjective:      Patient is having increase intensity of right face and neck sense of fullness or pressure. Labs revealed increased WBC at 15.1, decreased hemoglobin at 9.5 and increased blood sugar at 125, decreased calcium at 7.4. CSF study shows no growth. Current Facility-Administered Medications   Medication Dose Route Frequency    lidocaine (XYLOCAINE) 4 % cream   Topical TID    ibuprofen (MOTRIN) tablet 800 mg  800 mg Oral BID PRN    polyethylene glycol (MIRALAX) packet 17 g  17 g Oral DAILY    0.9% sodium chloride infusion  75 mL/hr IntraVENous CONTINUOUS    polyethylene glycol (MIRALAX) packet 17 g  17 g Oral DAILY    oxyCODONE-acetaminophen (PERCOCET) 5-325 mg per tablet 1 Tablet  1 Tablet Oral Q4H PRN    cilostazoL (PLETAL) tablet 100 mg  100 mg Oral ACB    [Held by provider] upadacitinib Tb24 15 mg (Patient Supplied)  15 mg Oral QHS    enoxaparin (LOVENOX) injection 40 mg  40 mg SubCUTAneous DAILY    methylPREDNISolone (Solu-MEDROL) 1 g in 100 mL NS MBP  1 g IntraVENous DAILY    acyclovir (ZOVIRAX) 650 mg in 0.9% sodium chloride 100 mL IVPB  10 mg/kg IntraVENous Q8H    acetaminophen (TYLENOL) tablet 650 mg  650 mg Oral Q4H PRN    Or    acetaminophen (TYLENOL) solution 650 mg  650 mg Per NG tube Q4H PRN    Or    acetaminophen (TYLENOL) suppository 650 mg  650 mg Rectal Q4H PRN    aspirin chewable tablet 81 mg  81 mg Oral DAILY    NIFEdipine ER (PROCARDIA XL) tablet 30 mg  30 mg Oral DAILY        Review of Systems:    Pertinent items are noted in HPI. Objective:     Patient Vitals for the past 8 hrs:   BP Temp Pulse Resp SpO2   03/07/22 0812 112/72 98.2 °F (36.8 °C) 88 18 98 %   03/07/22 0700 -- -- 90 -- --   03/07/22 0257 119/82 98.8 °F (37.1 °C) 73 18 95 %       No intake/output data recorded.   03/05 1901 - 03/07 0700  In: 2837.5 [P.O.:720; I.V.:2117.5]  Out: 0     Lab Review   Recent Results (from the past 24 hour(s))   SAMPLES BEING HELD    Collection Time: 03/06/22  2:54 PM   Result Value Ref Range    SAMPLES BEING HELD 1 CUP WITH URINE     COMMENT        Add-on orders for these samples will be processed based on acceptable specimen integrity and analyte stability, which may vary by analyte. CBC WITH AUTOMATED DIFF    Collection Time: 03/07/22  1:26 AM   Result Value Ref Range    WBC 15.1 (H) 3.6 - 11.0 K/uL    RBC 3.50 (L) 3.80 - 5.20 M/uL    HGB 9.5 (L) 11.5 - 16.0 g/dL    HCT 29.6 (L) 35.0 - 47.0 %    MCV 84.6 80.0 - 99.0 FL    MCH 27.1 26.0 - 34.0 PG    MCHC 32.1 30.0 - 36.5 g/dL    RDW 14.8 (H) 11.5 - 14.5 %    PLATELET 864 035 - 961 K/uL    MPV 8.9 8.9 - 12.9 FL    NRBC 0.1 (H) 0  WBC    ABSOLUTE NRBC 0.02 (H) 0.00 - 0.01 K/uL    NEUTROPHILS 86 (H) 32 - 75 %    LYMPHOCYTES 10 (L) 12 - 49 %    MONOCYTES 2 (L) 5 - 13 %    EOSINOPHILS 0 0 - 7 %    BASOPHILS 0 0 - 1 %    METAMYELOCYTES 2 (H) 0 %    IMMATURE GRANULOCYTES 0 %    ABS. NEUTROPHILS 13.0 (H) 1.8 - 8.0 K/UL    ABS. LYMPHOCYTES 1.5 0.8 - 3.5 K/UL    ABS. MONOCYTES 0.3 0.0 - 1.0 K/UL    ABS. EOSINOPHILS 0.0 0.0 - 0.4 K/UL    ABS. BASOPHILS 0.0 0.0 - 0.1 K/UL    ABS. IMM. GRANS. 0.0 K/UL    DF MANUAL      RBC COMMENTS NORMOCYTIC, NORMOCHROMIC     METABOLIC PANEL, BASIC    Collection Time: 03/07/22  1:26 AM   Result Value Ref Range    Sodium 138 136 - 145 mmol/L    Potassium 3.9 3.5 - 5.1 mmol/L    Chloride 110 (H) 97 - 108 mmol/L    CO2 25 21 - 32 mmol/L    Anion gap 3 (L) 5 - 15 mmol/L    Glucose 125 (H) 65 - 100 mg/dL    BUN 16 6 - 20 MG/DL    Creatinine 0.67 0.55 - 1.02 MG/DL    BUN/Creatinine ratio 24 (H) 12 - 20      GFR est AA >60 >60 ml/min/1.73m2    GFR est non-AA >60 >60 ml/min/1.73m2    Calcium 7.4 (L) 8.5 - 10.1 MG/DL     PHYSICAL EXAM:     NEUROLOGICAL EXAM:     Appearance:   The patient is well developed, well nourished, provides a coherent history and is in no acute distress. Mental Status: Oriented to time, place and person. Fluent, no aphasia or dysarthria. Mood and affect appropriate. Cranial Nerves:   Intact visual fields. MOY, EOM's full, no nystagmus, no ptosis. Decrease cold right face. Corneal reflexes are intact. Facial movement is symmetric. Hearing is normal bilaterally. Palate is midline with normal elevation. Sternocleidomastoid and trapezius muscles are normal. Tongue is midline. Motor:  5/5 strength in upper and lower proximal and distal muscles. Normal bulk and tone. No fasciculations. No pronator drift. Reflexes:   Deep tendon reflexes 1+/4 and symmetrical. Downgoing toes. Sensory:   Normal to cold right neck and RUE. Gait:  Not tested. Tremor:   No tremor noted. Cerebellar:  Intact FTN/CUATE/HTS           Assessment:   Acute myelitis  Acute encephalitis  Varicella zoster infection    Plan:   Neurological examination is unchanged. It reveals decreased sensation right face neck and right upper extremity.  Patient currently with acute myelitis/encephalitis secondary to currently to varicella-zoster infection. Gabbie Bell brought about by her immunocompromised state due to treatments for her rheumatoid arthritis.     Head CT without contrast did not reveal any acute process.  Brain MRI with and without contrast did not reveal any abnormal enhancements.  Extensive abnormal FLAIR and T2 hyperintensity at the inferior medulla extending through the upper cord which on accompanying cervical spine MRI extends to C5 level.  No enhancement but with restricted diffusion.     Cervical spine MRI with and without contrast again revealed continuous T2 and steer hyperintense signal shown within the central and right side of the cord extending from the inferior level of the medulla through the C5 level without cord enhancement abnormality. Reversal of cervical lordosis.     Head and neck CTA did not reveal any flow-limiting stenosis or aneurysm.  No ICA stenosis.  CT perfusion study did not reveal any acute abnormality.     Lumbar puncture was done which revealed cell count mainly WBC predominantly lymphocytic but normal glucose and protein.  Meningitis panel was positive for varicella-zoster.  Other CSF studies are pending.     NMO antibody testing is pending. Facial and neck pain likely from the steroids. If worsens, then trial of medications for neuralgia.     Appreciate infectious disease input. Continue IV acyclovir. Finishing 5 day course of 1000 mg IV Solu-Medrol. Last dose is tomorrow.        May need to discuss with her rheumatologist with regards to her immunosuppressive therapy.     Follow up with Dr. Jose Echevarria (neurology) 1 month after discharge.       Signed:  Belia Nj., MD  3/7/2022  9:55 AM

## 2022-03-07 NOTE — PROGRESS NOTES
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 No active skin lesions or vesicles. S/p LP with normal protein and glucose and elevated WBC to 31. Gram stain is no growth to date (awaiting final). · Continue Acyclovir 650mg q8hr- monitor renal fxn  · Plan minimum 14 days IV acyclovir & d/c with PICC line. May need extended course depending on response. Recommend keeping patient in the hospital until Wednesday. High-dose prednisone to be completed tomorrow    · Acute Myelitis: liely 2/2 varicella zoster infection. · Cont Solumedrol 1g daily x5 doses (last dose 3/8/22)  · Neuro following      · Leukocytosis: likely 2/2 to steroids. Continue to monitor     · RA: holding Upadacitinib. Attempt to get in touch with Pt's rheumatologist.  Percocet prn and motrin prn, lidocaine prn for pain control. Subjective:     Complaining of increased right neck and shoulder \"pressure\" today. No new symptoms    Objective:     Vitals:   Visit Vitals  /62 (BP 1 Location: Left upper arm, BP Patient Position: At rest)   Pulse (!) 118   Temp 98.8 °F (37.1 °C)   Resp 18   Ht 5' 3\" (1.6 m)   Wt 147 lb (66.7 kg)   SpO2 98%   BMI 26.04 kg/m²        Tmax:  Temp (24hrs), Av.4 °F (36.9 °C), Min:98.1 °F (36.7 °C), Max:98.8 °F (37.1 °C)      Exam:   Patient is intubated:  no    Physical Examination:   General:  Alert, cooperative, no distress   Head:  Normocephalic, atraumatic. Eyes:  Conjunctivae clear   Neck: Supple       Lungs:   No distress. Chest wall:     Heart:     Abdomen:   non-distended   Extremities: Moves all. Skin:  No rash   Neurologic: CNII-XII intact. Normal strength.   No focal weakness     Labs:        No lab exists for component: ITNL   No results for input(s): CPK, CKMB, TROIQ in the last 72 hours. Recent Labs     03/07/22  0126 03/06/22  0318 03/05/22  0442    136 135*   K 3.9 4.3 4.5   * 107 106   CO2 25 25 24   BUN 16 14 12   CREA 0.67 0.79 0.71   * 137* 138*   PHOS  --   --  4.1   MG  --   --  2.0   WBC 15.1* 11.4* 7.6   HGB 9.5* 10.3* 10.6*   HCT 29.6* 32.7* 32.3*    346 369     No results for input(s): INR, PTP, APTT, INREXT in the last 72 hours.   Needs: urine analysis, urine sodium, protein and creatinine  No results found for: SRINIVAS, CREAU      Cultures:     No results found for: SDES  Lab Results   Component Value Date/Time    Culture result: NO GROWTH THUS FAR HOLDING 7 DAYS 03/04/2022 11:04 AM       Radiology:     Medications       Current Facility-Administered Medications   Medication Dose Route Frequency Last Admin    lidocaine (XYLOCAINE) 4 % cream   Topical TID Given at 03/07/22 1132    ibuprofen (MOTRIN) tablet 800 mg  800 mg Oral BID  mg at 03/07/22 7505    polyethylene glycol (MIRALAX) packet 17 g  17 g Oral DAILY 17 g at 03/07/22 0817    0.9% sodium chloride infusion  75 mL/hr IntraVENous CONTINUOUS 75 mL/hr at 03/06/22 2201    polyethylene glycol (MIRALAX) packet 17 g  17 g Oral DAILY 17 g at 03/07/22 0816    oxyCODONE-acetaminophen (PERCOCET) 5-325 mg per tablet 1 Tablet  1 Tablet Oral Q4H PRN 1 Tablet at 03/07/22 0989    cilostazoL (PLETAL) tablet 100 mg  100 mg Oral  mg at 03/07/22 9094    [Held by provider] upadacitinib Tb24 15 mg (Patient Supplied)  15 mg Oral QHS 15 mg at 03/04/22 2100    enoxaparin (LOVENOX) injection 40 mg  40 mg SubCUTAneous DAILY 40 mg at 03/07/22 0816    methylPREDNISolone (Solu-MEDROL) 1 g in 100 mL NS MBP  1 g IntraVENous DAILY 1,000 mg at 03/07/22 1132    acyclovir (ZOVIRAX) 650 mg in 0.9% sodium chloride 100 mL IVPB  10 mg/kg IntraVENous Q8H 650 mg at 03/07/22 8664    acetaminophen (TYLENOL) tablet 650 mg  650 mg Oral Q4H  mg at 03/03/22 1959    Or    acetaminophen (TYLENOL) solution 650 mg  650 mg Per NG tube Q4H PRN      Or    acetaminophen (TYLENOL) suppository 650 mg  650 mg Rectal Q4H PRN      aspirin chewable tablet 81 mg  81 mg Oral DAILY 81 mg at 03/07/22 0816    NIFEdipine ER (PROCARDIA XL) tablet 30 mg  30 mg Oral DAILY 30 mg at 03/06/22 7003           Case discussed with: Hospitalist      Yessenia Regan DO

## 2022-03-07 NOTE — PROGRESS NOTES
Bedside and Verbal shift change report given to Jaspreet Benson RN (oncoming nurse) by Maxx Kathleen RN (offgoing nurse). Report included the following information SBAR, Kardex, Intake/Output, MAR and Recent Results. This patient was assisted with Intentional Toileting every 2 hours during this shift as appropriate. Documentation of ambulation and output reflected on Flowsheet as appropriate. Purposeful hourly rounding was completed using AIDET and 5Ps. Outcomes of PHR documented as they occurred. Bed alarm in use as appropriate. Dual Suction and ambubag in place. Bedside and Verbal shift change report given to Jimmy Madera RN (oncoming nurse) by Jaspreet Benson RN (offgoing nurse). Report included the following information SBAR, Kardex, Intake/Output, MAR and Recent Results.

## 2022-03-07 NOTE — PROGRESS NOTES
Bedside and Verbal shift change report given to Bev Metz RN (oncoming nurse) by Lora Castro RN (offgoing nurse). Report included the following information SBAR, Kardex, Intake/Output, MAR and Recent Results.

## 2022-03-08 ENCOUNTER — APPOINTMENT (OUTPATIENT)
Dept: GENERAL RADIOLOGY | Age: 37
DRG: 074 | End: 2022-03-08
Attending: INTERNAL MEDICINE
Payer: COMMERCIAL

## 2022-03-08 LAB
ANGIO CONVERTING ENZ, CSF: <1.5 U/L (ref 0–2.5)
ANION GAP SERPL CALC-SCNC: 6 MMOL/L (ref 5–15)
BASOPHILS # BLD: 0 K/UL (ref 0–0.1)
BASOPHILS NFR BLD: 0 % (ref 0–1)
BUN SERPL-MCNC: 13 MG/DL (ref 6–20)
BUN/CREAT SERPL: 23 (ref 12–20)
CALCIUM SERPL-MCNC: 6.9 MG/DL (ref 8.5–10.1)
CHLORIDE SERPL-SCNC: 111 MMOL/L (ref 97–108)
CO2 SERPL-SCNC: 24 MMOL/L (ref 21–32)
CREAT SERPL-MCNC: 0.56 MG/DL (ref 0.55–1.02)
DIFFERENTIAL METHOD BLD: ABNORMAL
EOSINOPHIL # BLD: 0 K/UL (ref 0–0.4)
EOSINOPHIL NFR BLD: 0 % (ref 0–7)
ERYTHROCYTE [DISTWIDTH] IN BLOOD BY AUTOMATED COUNT: 15.1 % (ref 11.5–14.5)
GLUCOSE SERPL-MCNC: 111 MG/DL (ref 65–100)
HCT VFR BLD AUTO: 30.2 % (ref 35–47)
HGB BLD-MCNC: 9.6 G/DL (ref 11.5–16)
IMM GRANULOCYTES # BLD AUTO: 0 K/UL
IMM GRANULOCYTES NFR BLD AUTO: 0 %
LYMPHOCYTES # BLD: 2.2 K/UL (ref 0.8–3.5)
LYMPHOCYTES NFR BLD: 17 % (ref 12–49)
MCH RBC QN AUTO: 27.2 PG (ref 26–34)
MCHC RBC AUTO-ENTMCNC: 31.8 G/DL (ref 30–36.5)
MCV RBC AUTO: 85.6 FL (ref 80–99)
METAMYELOCYTES NFR BLD MANUAL: 2 %
MONOCYTES # BLD: 0.5 K/UL (ref 0–1)
MONOCYTES NFR BLD: 4 % (ref 5–13)
NEUTS BAND NFR BLD MANUAL: 1 % (ref 0–6)
NEUTS SEG # BLD: 10 K/UL (ref 1.8–8)
NEUTS SEG NFR BLD: 76 % (ref 32–75)
NRBC # BLD: 0.08 K/UL (ref 0–0.01)
NRBC BLD-RTO: 0.6 PER 100 WBC
PLATELET # BLD AUTO: 344 K/UL (ref 150–400)
PMV BLD AUTO: 9 FL (ref 8.9–12.9)
POTASSIUM SERPL-SCNC: 3.5 MMOL/L (ref 3.5–5.1)
RBC # BLD AUTO: 3.53 M/UL (ref 3.8–5.2)
RBC MORPH BLD: ABNORMAL
SODIUM SERPL-SCNC: 141 MMOL/L (ref 136–145)
WBC # BLD AUTO: 13 K/UL (ref 3.6–11)

## 2022-03-08 PROCEDURE — 85025 COMPLETE CBC W/AUTO DIFF WBC: CPT

## 2022-03-08 PROCEDURE — 74011250636 HC RX REV CODE- 250/636: Performed by: INTERNAL MEDICINE

## 2022-03-08 PROCEDURE — 76937 US GUIDE VASCULAR ACCESS: CPT

## 2022-03-08 PROCEDURE — 99233 SBSQ HOSP IP/OBS HIGH 50: CPT | Performed by: INTERNAL MEDICINE

## 2022-03-08 PROCEDURE — 36415 COLL VENOUS BLD VENIPUNCTURE: CPT

## 2022-03-08 PROCEDURE — 74011250636 HC RX REV CODE- 250/636: Performed by: NURSE PRACTITIONER

## 2022-03-08 PROCEDURE — 74011250637 HC RX REV CODE- 250/637: Performed by: INTERNAL MEDICINE

## 2022-03-08 PROCEDURE — 80048 BASIC METABOLIC PNL TOTAL CA: CPT

## 2022-03-08 PROCEDURE — 36573 INSJ PICC RS&I 5 YR+: CPT | Performed by: INTERNAL MEDICINE

## 2022-03-08 PROCEDURE — 74011250637 HC RX REV CODE- 250/637: Performed by: HOSPITALIST

## 2022-03-08 PROCEDURE — 65270000029 HC RM PRIVATE

## 2022-03-08 PROCEDURE — 02HV33Z INSERTION OF INFUSION DEVICE INTO SUPERIOR VENA CAVA, PERCUTANEOUS APPROACH: ICD-10-PCS | Performed by: INTERNAL MEDICINE

## 2022-03-08 PROCEDURE — 96372 THER/PROPH/DIAG INJ SC/IM: CPT

## 2022-03-08 PROCEDURE — 99232 SBSQ HOSP IP/OBS MODERATE 35: CPT | Performed by: PSYCHIATRY & NEUROLOGY

## 2022-03-08 PROCEDURE — 2709999900 HC NON-CHARGEABLE SUPPLY

## 2022-03-08 PROCEDURE — 74011000258 HC RX REV CODE- 258: Performed by: INTERNAL MEDICINE

## 2022-03-08 PROCEDURE — C1751 CATH, INF, PER/CENT/MIDLINE: HCPCS

## 2022-03-08 PROCEDURE — 74011000258 HC RX REV CODE- 258: Performed by: NURSE PRACTITIONER

## 2022-03-08 PROCEDURE — 65660000000 HC RM CCU STEPDOWN

## 2022-03-08 PROCEDURE — 74011000250 HC RX REV CODE- 250: Performed by: INTERNAL MEDICINE

## 2022-03-08 RX ORDER — HYDROMORPHONE HYDROCHLORIDE 1 MG/ML
1 INJECTION, SOLUTION INTRAMUSCULAR; INTRAVENOUS; SUBCUTANEOUS
Status: DISCONTINUED | OUTPATIENT
Start: 2022-03-08 | End: 2022-03-09

## 2022-03-08 RX ADMIN — ACYCLOVIR SODIUM 650 MG: 50 INJECTION, SOLUTION INTRAVENOUS at 06:23

## 2022-03-08 RX ADMIN — NIFEDIPINE 30 MG: 30 TABLET, FILM COATED, EXTENDED RELEASE ORAL at 20:45

## 2022-03-08 RX ADMIN — ASPIRIN 81 MG: 81 TABLET, CHEWABLE ORAL at 08:25

## 2022-03-08 RX ADMIN — POLYETHYLENE GLYCOL 3350 17 G: 17 POWDER, FOR SOLUTION ORAL at 08:25

## 2022-03-08 RX ADMIN — OXYCODONE AND ACETAMINOPHEN 1 TABLET: 5; 325 TABLET ORAL at 15:41

## 2022-03-08 RX ADMIN — OXYCODONE AND ACETAMINOPHEN 1 TABLET: 5; 325 TABLET ORAL at 11:22

## 2022-03-08 RX ADMIN — ACYCLOVIR SODIUM 650 MG: 50 INJECTION, SOLUTION INTRAVENOUS at 15:41

## 2022-03-08 RX ADMIN — SODIUM CHLORIDE 1000 MG: 900 INJECTION INTRAVENOUS at 11:17

## 2022-03-08 RX ADMIN — OXYCODONE AND ACETAMINOPHEN 1 TABLET: 5; 325 TABLET ORAL at 20:45

## 2022-03-08 RX ADMIN — HYDROMORPHONE HYDROCHLORIDE 1 MG: 1 INJECTION, SOLUTION INTRAMUSCULAR; INTRAVENOUS; SUBCUTANEOUS at 12:35

## 2022-03-08 RX ADMIN — CILOSTAZOL 100 MG: 100 TABLET ORAL at 08:25

## 2022-03-08 RX ADMIN — ACYCLOVIR SODIUM 650 MG: 50 INJECTION, SOLUTION INTRAVENOUS at 22:53

## 2022-03-08 RX ADMIN — LIDOCAINE 4%: 4 CREAM TOPICAL at 08:36

## 2022-03-08 RX ADMIN — SODIUM CHLORIDE 75 ML/HR: 9 INJECTION, SOLUTION INTRAVENOUS at 15:40

## 2022-03-08 RX ADMIN — HYDROMORPHONE HYDROCHLORIDE 0.2 MG: 1 INJECTION, SOLUTION INTRAMUSCULAR; INTRAVENOUS; SUBCUTANEOUS at 08:36

## 2022-03-08 RX ADMIN — LIDOCAINE 4%: 4 CREAM TOPICAL at 15:41

## 2022-03-08 RX ADMIN — ENOXAPARIN SODIUM 40 MG: 100 INJECTION SUBCUTANEOUS at 08:25

## 2022-03-08 RX ADMIN — HYDROMORPHONE HYDROCHLORIDE 1 MG: 1 INJECTION, SOLUTION INTRAMUSCULAR; INTRAVENOUS; SUBCUTANEOUS at 23:02

## 2022-03-08 RX ADMIN — HYDROMORPHONE HYDROCHLORIDE 1 MG: 1 INJECTION, SOLUTION INTRAMUSCULAR; INTRAVENOUS; SUBCUTANEOUS at 17:44

## 2022-03-08 RX ADMIN — OXYCODONE AND ACETAMINOPHEN 1 TABLET: 5; 325 TABLET ORAL at 06:42

## 2022-03-08 NOTE — PROGRESS NOTES
Spiritual Care Assessment/Progress Note  [address]      NAME: Cristine Alonso      MRN: [id number]  AGE: 39 y.o. SEX: female  Voodoo Affiliation: Jeradhovah witness   Language: English     3/8/2022     Total Time (in minutes): 10     Spiritual Assessment begun in [facility] 5M1 MED SURG 1 through conversation with:         []Patient        [] Family    [] Friend(s)        Reason for Consult: Initial/Spiritual assessment, patient floor     Spiritual beliefs: (Please include comment if needed)     [] Identifies with a cristela tradition:         [] Supported by a cristela community:            [] Claims no spiritual orientation:           [] Seeking spiritual identity:                [] Adheres to an individual form of spirituality:           [x] Not able to assess:                           Identified resources for coping:      [] Prayer                               [] Music                  [] Guided Imagery     [] Family/friends                 [] Pet visits     [] Devotional reading                         [x] Unknown     [] Other:                                              Interventions offered during this visit: (See comments for more details)                Plan of Care:     [] Support spiritual and/or cultural needs    [] Support AMD and/or advance care planning process      [] Support grieving process   [] Coordinate Rites and/or Rituals    [] Coordination with community clergy   [] No spiritual needs identified at this time   [] Detailed Plan of Care below (See Comments)  [] Make referral to Music Therapy  [] Make referral to Pet Therapy     [] Make referral to Addiction services  [] Make referral to [facility]  [] Make referral to Spiritual Care Partner  [] No future visits requested        [x] Contact Spiritual Care for further referrals     Comments:  Spiritual care assessment attempted for Mrs. Cristine Alonso on 7W4 Med Surg unit. Pt is under contact precautions and attempt was by telephone. Phone rang busy, than later there was no answer. Advised nurse to contact [facility] for any further referrals.     Chaplain Seth Olvera M.Div.  Christofer Devi ([id number])

## 2022-03-08 NOTE — PROGRESS NOTES
Bedside and Verbal shift change report given to Arlet (oncoming nurse) by Amber Castañeda (offgoing nurse). Report included the following information SBAR, Procedure Summary, Intake/Output, MAR and Recent Results.

## 2022-03-08 NOTE — PROGRESS NOTES
3/8/2022  Case Management Progress Note    1:48 PM  Patient is 39year old female admitted 3/4 with right facial numbness  Patient's RUR is 4% green/low risk for readmission  Covid test: negative 3/4   Chart reviewed--patient discussed at IDR rounds  Per rounds patient will be ready for discharge tomorrow with IV acyclovir. I have sent the orders to [facility] to get that set up. She will get her PICC line tomorrow morning. She does not have any other noted needs for discharge and will return home with family and IV infusion. Transition of Care Plan   1. Continue medical management/treatment  2. Home with family and IV acyclovir   3. Family will transport at discharge   4. Follow up outpatient with PCP, specialties as needed  5.  CM will continue to follow    FREDERIC Alcaraz

## 2022-03-08 NOTE — PROGRESS NOTES
Neurology Progress Note    Patient ID:  Gonzalez Hopson  [id number]  39 y.o.  1985    CC: right face, neck and arm paresthesia    Subjective:      Patient now mainly complains of sense of swelling right face, arm and legs. Labs done revealed increased WBC at 13 (steroid related), decreased hemoglobin at 9.6, decreased calcium at 6.9. NMO antibody testing was negative. Negative VDRL and cryptococcal CSF testing. Positive West Nile virus testing. Current Facility-Administered Medications   Medication Dose Route Frequency    HYDROmorphone (DILAUDID) syringe 0.2 mg  0.2 mg IntraVENous Q4H PRN    lidocaine (XYLOCAINE) 4 % cream   Topical TID    ibuprofen (MOTRIN) tablet 800 mg  800 mg Oral BID PRN    polyethylene glycol (MIRALAX) packet 17 g  17 g Oral DAILY    0.9% sodium chloride infusion  75 mL/hr IntraVENous CONTINUOUS    polyethylene glycol (MIRALAX) packet 17 g  17 g Oral DAILY    oxyCODONE-acetaminophen (PERCOCET) 5-325 mg per tablet 1 Tablet  1 Tablet Oral Q4H PRN    cilostazoL (PLETAL) tablet 100 mg  100 mg Oral ACB    [Held by provider] upadacitinib Tb24 15 mg (Patient Supplied)  15 mg Oral QHS    enoxaparin (LOVENOX) injection 40 mg  40 mg SubCUTAneous DAILY    methylPREDNISolone (Solu-MEDROL) 1 g in 100 mL NS MBP  1 g IntraVENous DAILY    acyclovir (ZOVIRAX) 650 mg in 0.9% sodium chloride 100 mL IVPB  10 mg/kg IntraVENous Q8H    acetaminophen (TYLENOL) tablet 650 mg  650 mg Oral Q4H PRN    Or    acetaminophen (TYLENOL) solution 650 mg  650 mg Per NG tube Q4H PRN    Or    acetaminophen (TYLENOL) suppository 650 mg  650 mg Rectal Q4H PRN    aspirin chewable tablet 81 mg  81 mg Oral DAILY    NIFEdipine ER (PROCARDIA XL) tablet 30 mg  30 mg Oral DAILY        Review of Systems:    Pertinent items are noted in HPI.     Objective:     Patient Vitals for the past 8 hrs:   BP Temp Pulse Resp SpO2   03/08/22 0746 116/65 98.2 °F (36.8 °C) 96 16 96 %   03/08/22 0355 129/80 97.6 °F (36.4 °C) 78 16 98 %       No intake/output data recorded. 03/06 1901 - 03/08 0700  In: 1732.5 [P.O.:920; I.V.:812.5]  Out: -     Lab Review   Recent Results (from the past 24 hour(s))   CBC WITH AUTOMATED DIFF    Collection Time: 03/08/22  2:19 AM   Result Value Ref Range    WBC 13.0 (H) 3.6 - 11.0 K/uL    RBC 3.53 (L) 3.80 - 5.20 M/uL    HGB 9.6 (L) 11.5 - 16.0 g/dL    HCT 30.2 (L) 35.0 - 47.0 %    MCV 85.6 80.0 - 99.0 FL    MCH 27.2 26.0 - 34.0 PG    MCHC 31.8 30.0 - 36.5 g/dL    RDW 15.1 (H) 11.5 - 14.5 %    PLATELET 540 573 - 419 K/uL    MPV 9.0 8.9 - 12.9 FL    NRBC 0.6 (H) 0  WBC    ABSOLUTE NRBC 0.08 (H) 0.00 - 0.01 K/uL    NEUTROPHILS 76 (H) 32 - 75 %    BAND NEUTROPHILS 1 0 - 6 %    LYMPHOCYTES 17 12 - 49 %    MONOCYTES 4 (L) 5 - 13 %    EOSINOPHILS 0 0 - 7 %    BASOPHILS 0 0 - 1 %    METAMYELOCYTES 2 (H) 0 %    IMMATURE GRANULOCYTES 0 %    ABS. NEUTROPHILS 10.0 (H) 1.8 - 8.0 K/UL    ABS. LYMPHOCYTES 2.2 0.8 - 3.5 K/UL    ABS. MONOCYTES 0.5 0.0 - 1.0 K/UL    ABS. EOSINOPHILS 0.0 0.0 - 0.4 K/UL    ABS. BASOPHILS 0.0 0.0 - 0.1 K/UL    ABS. IMM. GRANS. 0.0 K/UL    DF MANUAL      RBC COMMENTS NORMOCYTIC, NORMOCHROMIC     METABOLIC PANEL, BASIC    Collection Time: 03/08/22  2:19 AM   Result Value Ref Range    Sodium 141 136 - 145 mmol/L    Potassium 3.5 3.5 - 5.1 mmol/L    Chloride 111 (H) 97 - 108 mmol/L    CO2 24 21 - 32 mmol/L    Anion gap 6 5 - 15 mmol/L    Glucose 111 (H) 65 - 100 mg/dL    BUN 13 6 - 20 MG/DL    Creatinine 0.56 0.55 - 1.02 MG/DL    BUN/Creatinine ratio 23 (H) 12 - 20      GFR est AA >60 >60 ml/min/1.73m2    GFR est non-AA >60 >60 ml/min/1.73m2    Calcium 6.9 (L) 8.5 - 10.1 MG/DL     PHYSICAL EXAM:     NEUROLOGICAL EXAM:     Appearance: The patient is well developed, well nourished, provides a coherent history and is in no acute distress. Mental Status: Oriented to time, place and person. Fluent, no aphasia or dysarthria. Mood and affect appropriate. Cranial Nerves:   Intact visual fields. MOY, EOM's full, no nystagmus, no ptosis. Decrease cold and PP right face. Corneal reflexes are intact. Facial movement is symmetric. Hearing is normal bilaterally. Palate is midline with normal elevation. Sternocleidomastoid and trapezius muscles are normal. Tongue is midline. Motor:  5/5 strength in upper and lower proximal and distal muscles. Normal bulk and tone. No fasciculations. No pronator drift. Reflexes:   Deep tendon reflexes 1+/4 and symmetrical. Downgoing toes. Sensory:   Normal to cold and PP right neck and RUE. Gait:  Not tested. Tremor:   No tremor noted. Cerebellar:  Intact FTN/CUATE/HTS         Assessment:   Acute myelitis  Acute encephalitis  Varicella zoster infection    Plan:   Neurological examination is unchanged. It reveals decreased sensation right face neck and right upper extremity.  Patient currently with acute myelitis/encephalitis secondary to currently to varicella-zoster infection. Gabbie Bell brought about by her immunocompromised state due to treatments for her rheumatoid arthritis.     Head CT without contrast did not reveal any acute process.  Brain MRI with and without contrast did not reveal any abnormal enhancements.  Extensive abnormal FLAIR and T2 hyperintensity at the inferior medulla extending through the upper cord which on accompanying cervical spine MRI extends to C5 level.  No enhancement but with restricted diffusion.     Cervical spine MRI with and without contrast again revealed continuous T2 and steer hyperintense signal shown within the central and right side of the cord extending from the inferior level of the medulla through the C5 level without cord enhancement abnormality. Reversal of cervical lordosis.     Head and neck CTA did not reveal any flow-limiting stenosis or aneurysm.  No ICA stenosis.  CT perfusion study did not reveal any acute abnormality.     Lumbar puncture was done which revealed cell count mainly WBC predominantly lymphocytic but normal glucose and protein.  Meningitis panel was positive for varicella-zoster. Negative VDRL and cryptococcal testing. Positive West Nile testing. ID informed and no changes to management.  Other CSF studies are pending.     NMO antibody testing was negative. No evidence to support neuromyelitis optica.     Right sided sense of swelling likely from the steroids. Last day of IV solumedrol 1000 mg 5 day course. Should be better once off of steroids.      Appreciate infectious disease input. Continue IV acyclovir.      May need to discuss with her rheumatologist with regards to future immunosuppressive therapy.     Follow up with Dr. Chad Foss (neurology) 1 month after discharge.       Signed:  Teri Prado MD  3/8/2022  9:47 AM

## 2022-03-08 NOTE — PROGRESS NOTES
Spiritual Care Assessment/Progress Note  [facility]      NAME: Dona Diaz      MRN: [id number]  AGE: 39 y.o. SEX: female  Sikhism Affiliation: Jth witness   Language: English     3/8/2022     Total Time (in minutes): 5     Spiritual Assessment begun in [facility] 5M1 MED SURG 1 through conversation with:         []Patient        [] Family    [] Friend(s)        Reason for Consult: Initial/Spiritual assessment, patient floor     Spiritual beliefs: (Please include comment if needed)     [] Identifies with a cristela tradition:    Catalina Witness on record      [] Supported by a cristela community:            [] Claims no spiritual orientation:           [] Seeking spiritual identity:                [] Adheres to an individual form of spirituality:           [x] Not able to assess:                           Identified resources for coping:      [] Prayer                               [] Music                  [] Guided Imagery     [] Family/friends                 [] Pet visits     [] Devotional reading                         [x] Unknown     [] Other:                                             Interventions offered during this visit: (See comments for more details)                Plan of Care:     [] Support spiritual and/or cultural needs    [] Support AMD and/or advance care planning process      [] Support grieving process   [] Coordinate Rites and/or Rituals    [] Coordination with community clergy   [] No spiritual needs identified at this time   [] Detailed Plan of Care below (See Comments)  [] Make referral to Music Therapy  [] Make referral to Pet Therapy     [] Make referral to Addiction services  [] Make referral to [facility]  [] Make referral to Spiritual Care Partner  [] No future visits requested        [x] Contact Spiritual Care for further referrals     Comments:  Spiritual care assessment attempted again by room telephone for Mrs. Dona Diaz on 5M Med Surg unit. No answer. Advised nurse to contact [facility] for any further referrals.     Chaplain Andres Hamm M.Div.  Ronaldo Robbins ([id number])

## 2022-03-08 NOTE — PROGRESS NOTES
Post Discharge PICC and Antibiotic Orders    1. Diagnosis: Varicella meningoencephalitis/myelitis  2. Antibiotic: Acyclovir 650 mg IV every 8 hours through 3/31/2022  3. Routine PICC/ Cary/ Portacath Care including PRN catheter flow management  4. Weekly labs:   _x__CBC/diff/platelets   x___BUN/Creatinine   ___CPK   _x__AST/TotalBilirubin/AlkalinePhosphatase  5. Fax lab to [phone]  Call Critical Lab Values to [phone]  6. May send to IR for line evaluation or replacement [phone]  7. Home Health to pull PIC line at end of therapy or send to IR for Cary removal  8.   Allergies:  No Known Allergies      Braxton Harrison, DO

## 2022-03-08 NOTE — PROGRESS NOTES
PICC Placement Note    PRE-PROCEDURE VERIFICATION  Correct Procedure: yes  Correct Site:  yes  Temperature: Temp: 98.4 °F (36.9 °C), Temperature Source: Temp Source: Oral  Recent Labs     03/08/22  0219   BUN 13   CREA 0.56      WBC 13.0*     Allergies: Patient has no known allergies. Education materials for PICC Care given: yes. See Patient Education activity for further details. PICC Booklet placed at bedside: yes    Closed Ended PICC Catheters:  Flush Lumens as Follows:  Intermittent Medication:   Flush before and after each medication with 10 ml NS. Unused Ports:  Flush every 8 hours with 10 ml NS.  TPN Ports:  Flush every 24 hours with 20 ml NS prior to hanging new bag. Blood Draws: Stop infusion, draw off and waste 10 ml of blood. Draw sample with 10cc syringe or greater. DO NOT USE VACUTAINER . Transfer with appropriate device to lab  tubes. Flush with 20 ml NS. Dressing Change:  Every 7 days, and PRN using sterile technique if integrity of dressing is compromised. Initial dressing change for central line 24-48 hours post insertion if gauze is used. Apply new dressing per policy. PROCEDURE DETAIL  Consent was obtained and all questions were answered related to risks and benefits. A single lumen PICC line was inserted, as a sterile procedure using ultrasound and modified Seldinger technique for antibiotic therapy. The following documentation is in addition to the PICC properties in the lines/airways flowsheet :  Lot #: [id number]   Lidocaine 1% administered intradermally :yes  Internal Catheter Total Length: 35 (cm)  Vein Selection for PICC:right brachial  Central Line Bundle followed yes  Complication Related to Insertion:no    The placement was verified by EKG, MAX P WAVE @ 35 (cm). PER EKG PICC TIP @ C/A junction.      Line is okay to use: yes    Marnie Klinefelter, RN

## 2022-03-08 NOTE — PROGRESS NOTES
[facility]  [address] [place] [address], [address] [place] [address]  [phone]      Medical Progress Note      NAME: Derrick Antunez   :  1985  MRM:  [id number]    Date/Time of service: 3/8/2022         Subjective:     Chief Complaint:  Patient was personally seen and examined by me during this time period. Chart reviewed. States right facial numbness/pain worsing, continues to have weakness of RUE. Notes knee swelling and pain. Objective:       Vitals:       Last 24hrs VS reviewed since prior progress note. Most recent are:    Visit Vitals  /70 (BP 1 Location: Left upper arm, BP Patient Position: At rest)   Pulse 89   Temp 98.4 °F (36.9 °C)   Resp 18   Ht 5' 3\" (1.6 m)   Wt 66.7 kg (147 lb)   SpO2 100%   BMI 26.04 kg/m²     SpO2 Readings from Last 6 Encounters:   22 100%            Intake/Output Summary (Last 24 hours) at 3/8/2022 1324  Last data filed at 3/7/2022 1905  Gross per 24 hour   Intake 912.5 ml   Output --   Net 912.5 ml        Exam:     Physical Exam:    Gen:  Well-developed, well-nourished, in no acute distress  HEENT:  Pink conjunctivae, PERRL, hearing intact to voice  Resp:  No accessory muscle use, clear breath sounds without wheezes rales or rhonchi  Card:   Tachycardia, No murmurs, normal S1, S2 without thrills, bruits or peripheral edema  Abd:  Soft, non-tender, non-distended, normoactive bowel sounds are present  Musc:  B/l knee swelling   Skin:  No rashes  Neuro:  strength symmetrical, follows commands appropriately  Psych:  Good insight, oriented to person, place and time, alert    Medications Reviewed: (see below)    Lab Data Reviewed: (see below)    ______________________________________________________________________    Medications:     Current Facility-Administered Medications   Medication Dose Route Frequency    HYDROmorphone (DILAUDID) injection 1 mg  1 mg IntraVENous Q4H PRN    lidocaine (XYLOCAINE) 4 % cream   Topical TID    ibuprofen (MOTRIN) tablet 800 mg  800 mg Oral BID PRN    polyethylene glycol (MIRALAX) packet 17 g  17 g Oral DAILY    0.9% sodium chloride infusion  75 mL/hr IntraVENous CONTINUOUS    polyethylene glycol (MIRALAX) packet 17 g  17 g Oral DAILY    oxyCODONE-acetaminophen (PERCOCET) 5-325 mg per tablet 1 Tablet  1 Tablet Oral Q4H PRN    cilostazoL (PLETAL) tablet 100 mg  100 mg Oral ACB    [Held by provider] upadacitinib Tb24 15 mg (Patient Supplied)  15 mg Oral QHS    enoxaparin (LOVENOX) injection 40 mg  40 mg SubCUTAneous DAILY    acyclovir (ZOVIRAX) 650 mg in 0.9% sodium chloride 100 mL IVPB  10 mg/kg IntraVENous Q8H    acetaminophen (TYLENOL) tablet 650 mg  650 mg Oral Q4H PRN    Or    acetaminophen (TYLENOL) solution 650 mg  650 mg Per NG tube Q4H PRN    Or    acetaminophen (TYLENOL) suppository 650 mg  650 mg Rectal Q4H PRN    aspirin chewable tablet 81 mg  81 mg Oral DAILY    NIFEdipine ER (PROCARDIA XL) tablet 30 mg  30 mg Oral DAILY          Lab Review:     Recent Labs     03/08/22  0219 03/07/22  0126 03/06/22  0318   WBC 13.0* 15.1* 11.4*   HGB 9.6* 9.5* 10.3*   HCT 30.2* 29.6* 32.7*    332 346     Recent Labs     03/08/22  0219 03/07/22  0126 03/06/22  0318    138 136   K 3.5 3.9 4.3   * 110* 107   CO2 24 25 25   * 125* 137*   BUN 13 16 14   CREA 0.56 0.67 0.79   CA 6.9* 7.4* 8.5     Lab Results   Component Value Date/Time    Glucose (POC) 73 03/03/2022 05:40 PM    Glucose (POC) 63 (L) 03/03/2022 05:06 PM    Glucose (POC) 43 (LL) 03/03/2022 04:08 PM    Glucose (POC) 49 (LL) 03/03/2022 04:05 PM          Assessment / Plan:     40 yo hx of RA, presented w/ R facial numbness, R sided weakness, found to have inflammation from inferior medulla through C5, concerning for myelitis    Acute myelitis/ RUE Pain and Weakness: likely due to varicella zoster infection. Continue high dose IV steroids. Neuro following. IV dilaudid PRN.      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 S/p LP. continue acyclovir. Continue steroids for now; today last day. Continue precautions. ID following. Plan for PICC placement with DC on IV acyclovir likely 3/09/22. Tachycardia: Patient notes chronic and worked up prior but suspect worse with high dose steroids. Monitor     RA: holding upadacitinib due to varicella zoster infection. Percocet Prn and motrin PRN and dilaudid PRN and lidocaine to treat symptom flare while holding upadacitinib.     Raynaud's: cont nifedipine, pleta    Total time spent with patient: 31 min **I personally saw and examined the patient during this time period**                 Care Plan discussed with: Patient, nursing, ID     Discussed:  Care Plan    Prophylaxis:  Lovenox    Disposition:  Home w/Family           ___________________________________________________    Attending Physician: Jakub Frazier DO

## 2022-03-08 NOTE — PROGRESS NOTES
[facility] Progress Note           Serge Bustamante DO    [phone] Office  [phone]  Fax    3/8/2022      Assessment & Plan:   · Varicella Zoster Meningoencephalitis/myelitis on imaging: Pt is immunocompromised given h/o RA (was on upadacitinib). No active skin lesions or vesicles. .    · Continue Acyclovir 650mg q8hr- monitor renal fxn  · Plan minimum 14 days IV acyclovir & d/c with PICC line. May need extended course depending on response. Recommend keeping patient in the hospital until Wednesday. High-dose prednisone to be completed today   · Tentative IV antibiotic orders are in the chart    · Acute Myelitis: liely 2/2 varicella zoster infection. · Cont Solumedrol 1g daily x5 doses (last dose 3/8/22)  · Neuro following      · Leukocytosis: likely 2/2 to steroids. Continue to monitor     · RA: holding Upadacitinib. Patient to follow-up with her rheumatologist to discuss alternative rheumatoid arthritis treatments. 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 With no IgM isolated this likely represents a false positive or previous infection. In addition this would be an unusual time of year for Fannie Marchi Nile virus infection          Subjective:     Still with right-sided neck swelling/pressure    Objective:     Vitals:   Visit Vitals  /70 (BP 1 Location: Left upper arm, BP Patient Position: At rest)   Pulse 89   Temp 98.4 °F (36.9 °C)   Resp 18   Ht 5' 3\" (1.6 m)   Wt 147 lb (66.7 kg)   SpO2 100%   BMI 26.04 kg/m²        Tmax:  Temp (24hrs), Av.4 °F (36.9 °C), Min:97.6 °F (36.4 °C), Max:99.1 °F (37.3 °C)      Exam:   Patient is intubated:  no    Physical Examination:   General:  Alert, cooperative, no distress   Head:  Normocephalic, atraumatic. Eyes:  Conjunctivae clear   Neck: Supple       Lungs:   No distress.     Chest wall:     Heart:     Abdomen:   on-distended   Extremities: Moves all. Skin:  No rash   Neurologic: CNII-XII intact     Labs:        No lab exists for component: ITNL   No results for input(s): CPK, CKMB, TROIQ in the last 72 hours. Recent Labs     03/08/22  0219 03/07/22  0126 03/06/22  0318    138 136   K 3.5 3.9 4.3   * 110* 107   CO2 24 25 25   BUN 13 16 14   CREA 0.56 0.67 0.79   * 125* 137*   WBC 13.0* 15.1* 11.4*   HGB 9.6* 9.5* 10.3*   HCT 30.2* 29.6* 32.7*    332 346     No results for input(s): INR, PTP, APTT, INREXT in the last 72 hours.   Needs: urine analysis, urine sodium, protein and creatinine  No results found for: SRINIVAS, CREAU      Cultures:     No results found for: SDES  Lab Results   Component Value Date/Time    Culture result: NO GROWTH THUS FAR HOLDING 7 DAYS 03/04/2022 11:04 AM    Culture result: NO FUNGUS ISOLATED 2 DAYS 03/04/2022 11:02 AM       Radiology:     Medications       Current Facility-Administered Medications   Medication Dose Route Frequency Last Admin    HYDROmorphone (DILAUDID) injection 1 mg  1 mg IntraVENous Q4H PRN      lidocaine (XYLOCAINE) 4 % cream   Topical TID Given at 03/08/22 0836    ibuprofen (MOTRIN) tablet 800 mg  800 mg Oral BID  mg at 03/07/22 3289    polyethylene glycol (MIRALAX) packet 17 g  17 g Oral DAILY 17 g at 03/08/22 0825    0.9% sodium chloride infusion  75 mL/hr IntraVENous CONTINUOUS 75 mL/hr at 03/07/22 1620    polyethylene glycol (MIRALAX) packet 17 g  17 g Oral DAILY 17 g at 03/08/22 0825    oxyCODONE-acetaminophen (PERCOCET) 5-325 mg per tablet 1 Tablet  1 Tablet Oral Q4H PRN 1 Tablet at 03/08/22 1122    cilostazoL (PLETAL) tablet 100 mg  100 mg Oral  mg at 03/08/22 0825    [Held by provider] upadacitinib Tb24 15 mg (Patient Supplied)  15 mg Oral QHS 15 mg at 03/04/22 2100    enoxaparin (LOVENOX) injection 40 mg  40 mg SubCUTAneous DAILY 40 mg at 03/08/22 0825    acyclovir (ZOVIRAX) 650 mg in 0.9% sodium chloride 100 mL IVPB  10 mg/kg IntraVENous Q8H 650 mg at 03/08/22 1484    acetaminophen (TYLENOL) tablet 650 mg  650 mg Oral Q4H  mg at 03/03/22 1959    Or    acetaminophen (TYLENOL) solution 650 mg  650 mg Per NG tube Q4H PRN      Or    acetaminophen (TYLENOL) suppository 650 mg  650 mg Rectal Q4H PRN      aspirin chewable tablet 81 mg  81 mg Oral DAILY 81 mg at 03/08/22 0825    NIFEdipine ER (PROCARDIA XL) tablet 30 mg  30 mg Oral DAILY 30 mg at 03/07/22 4330           Case discussed with: Neurology, case management      Shlomo Walker DO

## 2022-03-09 VITALS
WEIGHT: 147 LBS | HEIGHT: 63 IN | TEMPERATURE: 98.1 F | SYSTOLIC BLOOD PRESSURE: 136 MMHG | HEART RATE: 86 BPM | BODY MASS INDEX: 26.05 KG/M2 | DIASTOLIC BLOOD PRESSURE: 78 MMHG | OXYGEN SATURATION: 98 % | RESPIRATION RATE: 17 BRPM

## 2022-03-09 LAB
ANION GAP SERPL CALC-SCNC: 5 MMOL/L (ref 5–15)
B BURGDOR IGG PATRN CSF IB-IMP: NEGATIVE
B BURGDOR IGM PATRN CSF IB-IMP: NEGATIVE
B BURGDOR18KD IGG CSF QL IB: ABNORMAL
B BURGDOR23KD IGG CSF QL IB: ABNORMAL
B BURGDOR23KD IGM CSF QL IB: ABNORMAL
B BURGDOR28KD IGG CSF QL IB: ABNORMAL
B BURGDOR30KD IGG CSF QL IB: ABNORMAL
B BURGDOR39KD IGG CSF QL IB: ABNORMAL
B BURGDOR39KD IGM CSF QL IB: ABNORMAL
B BURGDOR41KD IGG CSF QL IB: PRESENT
B BURGDOR41KD IGM CSF QL IB: ABNORMAL
B BURGDOR45KD IGG CSF QL IB: ABNORMAL
B BURGDOR58KD IGG CSF QL IB: ABNORMAL
B BURGDOR66KD IGG CSF QL IB: ABNORMAL
B BURGDOR93KD IGG CSF QL IB: ABNORMAL
BASOPHILS # BLD: 0 K/UL (ref 0–0.1)
BASOPHILS NFR BLD: 0 % (ref 0–1)
BUN SERPL-MCNC: 16 MG/DL (ref 6–20)
BUN/CREAT SERPL: 24 (ref 12–20)
CALCIUM SERPL-MCNC: 8 MG/DL (ref 8.5–10.1)
CHLORIDE SERPL-SCNC: 104 MMOL/L (ref 97–108)
CO2 SERPL-SCNC: 28 MMOL/L (ref 21–32)
CREAT SERPL-MCNC: 0.68 MG/DL (ref 0.55–1.02)
DIFFERENTIAL METHOD BLD: ABNORMAL
EOSINOPHIL # BLD: 0 K/UL (ref 0–0.4)
EOSINOPHIL NFR BLD: 0 % (ref 0–7)
ERYTHROCYTE [DISTWIDTH] IN BLOOD BY AUTOMATED COUNT: 15.1 % (ref 11.5–14.5)
GLUCOSE SERPL-MCNC: 108 MG/DL (ref 65–100)
HCT VFR BLD AUTO: 33.6 % (ref 35–47)
HGB BLD-MCNC: 10.7 G/DL (ref 11.5–16)
IMM GRANULOCYTES # BLD AUTO: 0 K/UL (ref 0–0.04)
IMM GRANULOCYTES NFR BLD AUTO: 0 % (ref 0–0.5)
LYMPHOCYTES # BLD: 2 K/UL (ref 0.8–3.5)
LYMPHOCYTES NFR BLD: 15 % (ref 12–49)
MCH RBC QN AUTO: 27.1 PG (ref 26–34)
MCHC RBC AUTO-ENTMCNC: 31.8 G/DL (ref 30–36.5)
MCV RBC AUTO: 85.1 FL (ref 80–99)
METAMYELOCYTES NFR BLD MANUAL: 3 %
MONOCYTES # BLD: 1.2 K/UL (ref 0–1)
MONOCYTES NFR BLD: 9 % (ref 5–13)
NEUTS BAND NFR BLD MANUAL: 1 %
NEUTS SEG # BLD: 9.9 K/UL (ref 1.8–8)
NEUTS SEG NFR BLD: 72 % (ref 32–75)
NRBC # BLD: 0.14 K/UL (ref 0–0.01)
NRBC BLD-RTO: 1 PER 100 WBC
PLATELET # BLD AUTO: 388 K/UL (ref 150–400)
PMV BLD AUTO: 8.6 FL (ref 8.9–12.9)
POTASSIUM SERPL-SCNC: 3.9 MMOL/L (ref 3.5–5.1)
RBC # BLD AUTO: 3.95 M/UL (ref 3.8–5.2)
RBC MORPH BLD: ABNORMAL
SODIUM SERPL-SCNC: 137 MMOL/L (ref 136–145)
WBC # BLD AUTO: 13.5 K/UL (ref 3.6–11)

## 2022-03-09 PROCEDURE — 99232 SBSQ HOSP IP/OBS MODERATE 35: CPT | Performed by: PSYCHIATRY & NEUROLOGY

## 2022-03-09 PROCEDURE — 99232 SBSQ HOSP IP/OBS MODERATE 35: CPT | Performed by: INTERNAL MEDICINE

## 2022-03-09 PROCEDURE — 74011250637 HC RX REV CODE- 250/637: Performed by: INTERNAL MEDICINE

## 2022-03-09 PROCEDURE — 96372 THER/PROPH/DIAG INJ SC/IM: CPT

## 2022-03-09 PROCEDURE — 74011250637 HC RX REV CODE- 250/637: Performed by: HOSPITALIST

## 2022-03-09 PROCEDURE — 74011250636 HC RX REV CODE- 250/636: Performed by: INTERNAL MEDICINE

## 2022-03-09 PROCEDURE — 85025 COMPLETE CBC W/AUTO DIFF WBC: CPT

## 2022-03-09 PROCEDURE — 77030018786 HC NDL GD F/USND BARD -B

## 2022-03-09 PROCEDURE — 80048 BASIC METABOLIC PNL TOTAL CA: CPT

## 2022-03-09 PROCEDURE — 74011250637 HC RX REV CODE- 250/637: Performed by: PSYCHIATRY & NEUROLOGY

## 2022-03-09 PROCEDURE — 36415 COLL VENOUS BLD VENIPUNCTURE: CPT

## 2022-03-09 PROCEDURE — 74011000258 HC RX REV CODE- 258: Performed by: INTERNAL MEDICINE

## 2022-03-09 RX ORDER — HYDROMORPHONE HYDROCHLORIDE 2 MG/1
2 TABLET ORAL
Status: DISCONTINUED | OUTPATIENT
Start: 2022-03-09 | End: 2022-03-09 | Stop reason: HOSPADM

## 2022-03-09 RX ORDER — POLYETHYLENE GLYCOL 3350 17 G/17G
17 POWDER, FOR SOLUTION ORAL DAILY
Qty: 30 PACKET | Refills: 0 | Status: SHIPPED | OUTPATIENT
Start: 2022-03-10 | End: 2022-03-30

## 2022-03-09 RX ORDER — GUAIFENESIN 100 MG/5ML
81 LIQUID (ML) ORAL DAILY
Qty: 30 TABLET | Refills: 0 | Status: SHIPPED | OUTPATIENT
Start: 2022-03-10 | End: 2022-03-30

## 2022-03-09 RX ORDER — GABAPENTIN 100 MG/1
100 CAPSULE ORAL 3 TIMES DAILY
Qty: 90 CAPSULE | Refills: 0 | Status: SHIPPED | OUTPATIENT
Start: 2022-03-09 | End: 2022-03-25

## 2022-03-09 RX ORDER — LIDOCAINE 40 MG/G
CREAM TOPICAL
Qty: 15 G | Refills: 2 | Status: SHIPPED | OUTPATIENT
Start: 2022-03-09 | End: 2022-03-30

## 2022-03-09 RX ORDER — GABAPENTIN 100 MG/1
100 CAPSULE ORAL 3 TIMES DAILY
Status: DISCONTINUED | OUTPATIENT
Start: 2022-03-09 | End: 2022-03-09 | Stop reason: HOSPADM

## 2022-03-09 RX ORDER — HYDROMORPHONE HYDROCHLORIDE 2 MG/1
2 TABLET ORAL
Qty: 20 TABLET | Refills: 0 | OUTPATIENT
Start: 2022-03-09 | End: 2022-03-14

## 2022-03-09 RX ORDER — UPADACITINIB 15 MG/1
15 TABLET, EXTENDED RELEASE ORAL
Qty: 30 EACH | Refills: 0 | Status: SHIPPED
Start: 2022-03-09 | End: 2022-03-18

## 2022-03-09 RX ADMIN — OXYCODONE AND ACETAMINOPHEN 1 TABLET: 5; 325 TABLET ORAL at 02:09

## 2022-03-09 RX ADMIN — CILOSTAZOL 100 MG: 100 TABLET ORAL at 08:01

## 2022-03-09 RX ADMIN — ACYCLOVIR SODIUM 650 MG: 50 INJECTION, SOLUTION INTRAVENOUS at 05:47

## 2022-03-09 RX ADMIN — HYDROMORPHONE HYDROCHLORIDE 1 MG: 1 INJECTION, SOLUTION INTRAMUSCULAR; INTRAVENOUS; SUBCUTANEOUS at 08:21

## 2022-03-09 RX ADMIN — ASPIRIN 81 MG: 81 TABLET, CHEWABLE ORAL at 08:21

## 2022-03-09 RX ADMIN — ENOXAPARIN SODIUM 40 MG: 100 INJECTION SUBCUTANEOUS at 08:21

## 2022-03-09 RX ADMIN — LIDOCAINE 4%: 4 CREAM TOPICAL at 08:01

## 2022-03-09 RX ADMIN — OXYCODONE AND ACETAMINOPHEN 1 TABLET: 5; 325 TABLET ORAL at 11:49

## 2022-03-09 RX ADMIN — POLYETHYLENE GLYCOL 3350 17 G: 17 POWDER, FOR SOLUTION ORAL at 08:21

## 2022-03-09 RX ADMIN — SODIUM CHLORIDE 75 ML/HR: 9 INJECTION, SOLUTION INTRAVENOUS at 08:21

## 2022-03-09 RX ADMIN — GABAPENTIN 100 MG: 100 CAPSULE ORAL at 11:49

## 2022-03-09 RX ADMIN — OXYCODONE AND ACETAMINOPHEN 1 TABLET: 5; 325 TABLET ORAL at 05:37

## 2022-03-09 NOTE — PROGRESS NOTES
Bedside and Verbal shift change report given to Tamir Ace (oncoming nurse) by Whitney Hess and [address] [place] [address] (offgoing nurse). Report included the following information SBAR, Intake/Output, MAR and Recent Results.

## 2022-03-09 NOTE — PROGRESS NOTES
3/9/2022  Case Management Progress Note    12:13 PM  Patient is 39year old female admitted 3/4 with right facial numbness  Patient's RUR is 4% green/low risk for readmission  Covid test: negative 3/4   Chart reviewed--patient discussed at IDR rounds  Per rounds patient is ready for discharge today. She is currently being taught for her IV acyclovir at home. She does not have any other needs--[facility] will provide IVs and nursing care. PICC line already placed. Home with family assistance otherwise. OK for discharge from CM standpoint. Transition of Care Plan   1. Discharge today, order already placed  2. Teaching for IV infusion currently  3. Family will transport at discharge  4. [facility] Mcqueen will do nursing care as well. 5. Follow up with PCP, specialties as needed  6.  OK for discharge from [address] , MSW

## 2022-03-09 NOTE — DISCHARGE SUMMARY
Physician Discharge Summary     Patient ID:  Kenny Aragon  [id number]  40 y.o.  1985    Admit date: 3/3/2022    Discharge date of service and time: 3/9/2022    Admission Diagnoses: Right facial numbness [R20.0]  Acute myelitis ([address] [G04.91]    Discharge Diagnoses:    Principal Diagnosis     Acute myelitis                                              Hospital Course and other diagnoses  Acute myelitis / Acute Varicella Zoster Meningoencephalitis - ID following. Infection due to immunocompromised state. S/p LP. PICC placed 3/8. DC on IV acyclovir through 3/31/22.     RUE Pain and Weakness / Right facial numbness - POA, likely due to myelitis.  Completed high dose IV steroids.  Neuro following. IV dilaudid PRN.      Rheumatoid arthritis - Holding upadacitinib due to varicella zoster infection. Percocet Prn lidocaine to treat symptom flare while holding upadacitinib. DC on PO dilaudid     Raynaud's - Continue nifedipine, pletal     Tachycardia - Chronic intermittent, now due to high dose steroids. Monitor      PCP: Russ Sumner DO    Consults: ID and Neurology    Significant Diagnostic Studies: See Hospital Course    Discharged home in improved condition.     Discharge Exam:  /80 (BP 1 Location: Left upper arm, BP Patient Position: At rest)   Pulse 82   Temp 98.9 °F (37.2 °C)   Resp 17   Ht 5' 3\" (1.6 m)   Wt 66.7 kg (147 lb)   SpO2 98%   BMI 26.04 kg/m²     Gen:  Well-developed, well-nourished, in no acute distress  HEENT:  Pink conjunctivae, PERRL, hearing intact to voice, moist mucous membranes  Neck:  Supple, without masses, thyroid non-tender  Resp:  No accessory muscle use, clear breath sounds without wheezes rales or rhonchi  Card:  No murmurs, normal S1, S2 without thrills, bruits or peripheral edema  Abd:  Soft, non-tender, non-distended, normoactive bowel sounds are present, no mass  Lymph:  No cervical or inguinal adenopathy  Musc:  No cyanosis or clubbing  Skin:  No rashes or ulcers, skin turgor is good  Neuro:  Cranial nerves are grossly intact, general motor weakness, follows commands appropriately  Psych:  Good insight, oriented to person, place and time, alert    Patient Instructions:   Current Discharge Medication List      START taking these medications    Details   acyclovir 667 mg 667 mg by IntraVENous route every eight (8) hours for 22 days. Qty: 66 Dose, Refills: 0      aspirin 81 mg chewable tablet Take 1 Tablet by mouth daily for 30 days. Qty: 30 Tablet, Refills: 0      HYDROmorphone (DILAUDID) 2 mg tablet Take 1 Tablet by mouth every four (4) hours as needed for Pain for up to 14 days. Max Daily Amount: 12 mg.  Qty: 20 Tablet, Refills: 0    Associated Diagnoses: Rheumatoid arthritis involving multiple sites with positive rheumatoid factor (HCC)      lidocaine (XYLOCAINE) 4 % topical cream Apply  to affected area two (2) times daily as needed for Pain for up to 30 days. Qty: 15 g, Refills: 2      polyethylene glycol (MIRALAX) 17 gram packet Take 1 Packet by mouth daily for 30 days. Qty: 30 Packet, Refills: 0         CONTINUE these medications which have CHANGED    Details   upadacitinib (Rinvoq) 15 mg Tb24 Take 15 mg by mouth nightly for 30 days. HOLD until you complete acyclovir  Indications: rheumatoid arthritis  Qty: 30 Each, Refills: 0         CONTINUE these medications which have NOT CHANGED    Details   cilostazoL (PLETAL) 50 mg tablet Take 100 mg by mouth daily. NIFEdipine ER (ADALAT CC) 30 mg ER tablet Take 30 mg by mouth nightly. drospirenone, contraceptive, (Slynd) 4 mg (28) tab Take 1 Tablet by mouth daily. Activity: Activity as tolerated  Diet: Regular Diet  Wound Care: None needed    Follow-up with your PCP in a few weeks.   Follow-up tests/labs - none    Signed:  Taylor Reina MD  3/9/2022  10:10 AM

## 2022-03-09 NOTE — DISCHARGE INSTRUCTIONS
Patient Discharge Instructions    Cristine Alonso / [id number] : 1985    Admitted 3/3/2022 Discharged: 3/9/2022     Primary Diagnoses  Problem List as of 3/9/2022 Never Reviewed          Codes Class Noted - Resolved    Acute myelitis ([facility] [address] ICD-10-CM: G04.91  ICD-9-CM: 341.20  3/4/2022 - Present        * (Principal) Right facial numbness ICD-10-CM: R20.0  ICD-9-CM: 782.0  3/3/2022 - Present              Take Home Medications     It is important that you take the medication exactly as they are prescribed. Keep your medication in the bottles provided by the pharmacist and keep a list of the medication names, dosages, and times to be taken in your wallet. Do not take other medications without consulting your doctor. What to do at Home    Recommended diet: Regular Diet    Recommended activity: Activity as tolerated    If you experience worse symptoms, please follow up with neurologist or rheumatologist.    Follow-up with your PCP in a few weeks      Follow-up Information     Follow up With Specialties Details Why Contact Info    Please return patient own Rinvoq at discharge         Jael Cunningham MD Neurology Schedule an appointment as soon as possible for a visit in 4 weeks Hospital Follow up [address] [address], [place] [phone]      [facility], [address] [facility] [phone]      Tanvi Webb DO Infectious Disease Schedule an appointment as soon as possible for a visit If symptoms worsen [address] [facility]  [phone]             Information obtained by :  I understand that if any problems occur once I am at home I am to contact my physician. I understand and acknowledge receipt of the instructions indicated above.                                                                                                                                            Physician's or R.N.'s Signature                                                                  Date/Time                                                                                                                                              Patient or Representative Signature                                                          Date/Time

## 2022-03-09 NOTE — PROGRESS NOTES
1900- Bedside and Verbal shift change report given to [address] [place] [address] and Cat RN (oncoming nurse) by Keron Walden (offgoing nurse). Report included the following information SBAR, Kardex, ED Summary, Procedure Summary, Intake/Output, Recent Results and Med Rec Status.

## 2022-03-09 NOTE — PROGRESS NOTES
Neurology Progress Note    Patient ID:  Caryl Alfaro  [id number]  40 y.o.  1985    CC: right face, neck and arm paresthesia    Subjective:      Patient reports less intense right face and neck sense of swelling and pain. Patient requiring as needed Dilaudid for relief. Labs done today revealed increased WBC 13.9, decreased hemoglobin at 10.7 and decreased calcium at 8. CSF studies revealed negative angiotensin converting enzyme and [place] ink prep CSF study. Patient has had a PICC line placed. Current Facility-Administered Medications   Medication Dose Route Frequency    HYDROmorphone (DILAUDID) tablet 2 mg  2 mg Oral Q4H PRN    gabapentin (NEURONTIN) capsule 100 mg  100 mg Oral TID    alteplase (CATHFLO) 1 mg in sterile water (preservative free) 1 mL injection  1 mg InterCATHeter PRN    lidocaine (XYLOCAINE) 4 % cream   Topical TID    ibuprofen (MOTRIN) tablet 800 mg  800 mg Oral BID PRN    polyethylene glycol (MIRALAX) packet 17 g  17 g Oral DAILY    polyethylene glycol (MIRALAX) packet 17 g  17 g Oral DAILY    oxyCODONE-acetaminophen (PERCOCET) 5-325 mg per tablet 1 Tablet  1 Tablet Oral Q4H PRN    cilostazoL (PLETAL) tablet 100 mg  100 mg Oral ACB    [Held by provider] upadacitinib Tb24 15 mg (Patient Supplied)  15 mg Oral QHS    enoxaparin (LOVENOX) injection 40 mg  40 mg SubCUTAneous DAILY    acyclovir (ZOVIRAX) 650 mg in 0.9% sodium chloride 100 mL IVPB  10 mg/kg IntraVENous Q8H    acetaminophen (TYLENOL) tablet 650 mg  650 mg Oral Q4H PRN    aspirin chewable tablet 81 mg  81 mg Oral DAILY    NIFEdipine ER (PROCARDIA XL) tablet 30 mg  30 mg Oral DAILY        Review of Systems:    Pertinent items are noted in HPI.     Objective:     Patient Vitals for the past 8 hrs:   BP Temp Pulse Resp SpO2   03/09/22 0804 136/80 98.9 °F (37.2 °C) 82 17 98 %   03/09/22 0430 (!) 142/89 98.8 °F (37.1 °C) (!) 103 16 99 %       03/09 0701 - 03/09 1900  In: 240 [P.O.:240]  Out: -   03/07 1901 - 03/09 0700  In: 3397.5 [P.O.:600; I.V.:2797.5]  Out: -     Lab Review   Recent Results (from the past 24 hour(s))   CBC WITH AUTOMATED DIFF    Collection Time: 03/09/22  5:33 AM   Result Value Ref Range    WBC 13.5 (H) 3.6 - 11.0 K/uL    RBC 3.95 3.80 - 5.20 M/uL    HGB 10.7 (L) 11.5 - 16.0 g/dL    HCT 33.6 (L) 35.0 - 47.0 %    MCV 85.1 80.0 - 99.0 FL    MCH 27.1 26.0 - 34.0 PG    MCHC 31.8 30.0 - 36.5 g/dL    RDW 15.1 (H) 11.5 - 14.5 %    PLATELET 350 285 - 478 K/uL    MPV 8.6 (L) 8.9 - 12.9 FL    NRBC 1.0 (H) 0  WBC    ABSOLUTE NRBC 0.14 (H) 0.00 - 0.01 K/uL    NEUTROPHILS 72 32 - 75 %    BAND NEUTROPHILS 1 %    LYMPHOCYTES 15 12 - 49 %    MONOCYTES 9 5 - 13 %    EOSINOPHILS 0 0 - 7 %    BASOPHILS 0 0 - 1 %    METAMYELOCYTES 3 %    IMMATURE GRANULOCYTES 0 0.0 - 0.5 %    ABS. NEUTROPHILS 9.9 (H) 1.8 - 8.0 K/UL    ABS. LYMPHOCYTES 2.0 0.8 - 3.5 K/UL    ABS. MONOCYTES 1.2 (H) 0.0 - 1.0 K/UL    ABS. EOSINOPHILS 0.0 0.0 - 0.4 K/UL    ABS. BASOPHILS 0.0 0.0 - 0.1 K/UL    ABS. IMM. GRANS. 0.0 0.00 - 0.04 K/UL    DF MANUAL      RBC COMMENTS NORMOCYTIC, NORMOCHROMIC     METABOLIC PANEL, BASIC    Collection Time: 03/09/22  5:33 AM   Result Value Ref Range    Sodium 137 136 - 145 mmol/L    Potassium 3.9 3.5 - 5.1 mmol/L    Chloride 104 97 - 108 mmol/L    CO2 28 21 - 32 mmol/L    Anion gap 5 5 - 15 mmol/L    Glucose 108 (H) 65 - 100 mg/dL    BUN 16 6 - 20 MG/DL    Creatinine 0.68 0.55 - 1.02 MG/DL    BUN/Creatinine ratio 24 (H) 12 - 20      GFR est AA >60 >60 ml/min/1.73m2    GFR est non-AA >60 >60 ml/min/1.73m2    Calcium 8.0 (L) 8.5 - 10.1 MG/DL     PHYSICAL EXAM:     NEUROLOGICAL EXAM:     Appearance: The patient is well developed, well nourished, provides a coherent history and is in no acute distress. Mental Status: Oriented to time, place and person. Fluent, no aphasia or dysarthria. Mood and affect appropriate. Cranial Nerves:   Intact visual fields.  MOY, EOM's full, no nystagmus, no ptosis. Decrease cold and PP right face. Corneal reflexes are intact. Facial movement is symmetric. Hearing is normal bilaterally. Palate is midline with normal elevation. Sternocleidomastoid and trapezius muscles are normal. Tongue is midline. Motor:  5/5 strength in upper and lower proximal and distal muscles. Normal bulk and tone. No fasciculations. No pronator drift. Reflexes:   Deep tendon reflexes 1+/4 and symmetrical. Downgoing toes. Sensory:   Normal to cold and PP right neck and RUE. Gait:  Not tested. Tremor:   No tremor noted. Cerebellar:  Intact FTN/CUATE/HTS         Assessment:   Acute myelitis  Acute encephalitis  Varicella zoster infection    Plan:   Neurological examination is unchanged. It reveals decreased sensation right face neck and right upper extremity.  Patient currently with acute myelitis/encephalitis secondary to currently to varicella-zoster infection. Silvestre Gonzales brought about by her immunocompromised state due to treatments for her rheumatoid arthritis.     Head CT without contrast did not reveal any acute process.  Brain MRI with and without contrast did not reveal any abnormal enhancements.  Extensive abnormal FLAIR and T2 hyperintensity at the inferior medulla extending through the upper cord which on accompanying cervical spine MRI extends to C5 level.  No enhancement but with restricted diffusion.     Cervical spine MRI with and without contrast again revealed continuous T2 and steer hyperintense signal shown within the central and right side of the cord extending from the inferior level of the medulla through the C5 level without cord enhancement abnormality. Reversal of cervical lordosis.     Head and neck CTA did not reveal any flow-limiting stenosis or aneurysm.  No ICA stenosis.  CT perfusion study did not reveal any acute abnormality.     Lumbar puncture was done which revealed cell count mainly WBC predominantly lymphocytic but normal glucose and protein.  Meningitis panel was positive for varicella-zoster. Negative VDRL, ACE and cryptococcal testing. Positive West Nile testing. ID informed and no changes to management.  Lyme titers are pending.     NMO antibody testing was negative. No evidence to support neuromyelitis optica.     Residual right sided paresthesia from the encephalitis/myelitis. Patient started on Gabapentin 100 mg TID. Finished IV solumedrol 1000 mg 5 day course.       Appreciate infectious disease input. Continue IV acyclovir through PICC line at home.      May need to discuss with her rheumatologist with regards to future immunosuppressive therapy.     Follow up with Dr. Dhaval Michelle (neurology) in 1 month.       Signed:  Yessenia Kimball MD  3/9/2022  10:55 AM

## 2022-03-09 NOTE — PROGRESS NOTES
Bedside and Verbal shift change report given to Mayur Nicole  (oncoming nurse) by Palomo Foy (offgoing nurse). Report included the following information SBAR, Intake/Output, MAR and Recent Results.

## 2022-03-09 NOTE — PROGRESS NOTES
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 No active skin lesions or vesicles. .    · Continue Acyclovir 650mg q8hr-discussed risks of renal toxicity. Patient to keep hydrated and avoid ibuprofen   · Plan minimum 14 days IV acyclovir & d/c with PICC line. May need extended course depending on response. Patient to follow-up with me in the office on 3/18/2022. Will make decision regarding duration of therapy at that time    · IV antibiotic orders are in the chart    · Acute Myelitis: liely 2/2 varicella zoster infection. · Cont Solumedrol 1g daily x5 doses (last dose 3/8/22)  · Neuro following      · Leukocytosis: likely 2/2 to steroids. Continue to monitor     · RA: holding Upadacitinib. Patient to follow-up with her rheumatologist to discuss alternative rheumatoid arthritis treatments. 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 With no IgM isolated this likely represents a false positive or previous infection. In addition this would be an unusual time of year for [place] Nile virus infection        Acyclovir 3/5-      Subjective:      right-sided neck swelling/pressure mildly improved today    Objective:     Vitals:   Visit Vitals  /80 (BP 1 Location: Left upper arm, BP Patient Position: At rest)   Pulse 82   Temp 98.9 °F (37.2 °C)   Resp 17   Ht 5' 3\" (1.6 m)   Wt 147 lb (66.7 kg)   SpO2 98%   BMI 26.04 kg/m²        Tmax:  Temp (24hrs), Av.4 °F (36.9 °C), Min:97.9 °F (36.6 °C), Max:98.9 °F (37.2 °C)      Exam:   Patient is intubated:  no    Physical Examination:   General:  Alert, cooperative, no distress   Head:  Normocephalic, atraumatic.    Eyes:  Conjunctivae clear   Neck: Supple       Lungs:   No distress. Chest wall:     Heart:     Abdomen:   on-distended   Extremities: Moves all. Skin:  No rash   Neurologic: CNII-XII intact     Labs:        No lab exists for component: ITNL   No results for input(s): CPK, CKMB, TROIQ in the last 72 hours. Recent Labs     03/09/22  0533 03/08/22  0219 03/07/22  0126    141 138   K 3.9 3.5 3.9    111* 110*   CO2 28 24 25   BUN 16 13 16   CREA 0.68 0.56 0.67   * 111* 125*   WBC 13.5* 13.0* 15.1*   HGB 10.7* 9.6* 9.5*   HCT 33.6* 30.2* 29.6*    344 332     No results for input(s): INR, PTP, APTT, INREXT, INREXT in the last 72 hours.   Needs: urine analysis, urine sodium, protein and creatinine  No results found for: SRINIVAS, CREAU      Cultures:     No results found for: SDES  Lab Results   Component Value Date/Time    Culture result: NO GROWTH THUS FAR HOLDING 7 DAYS 03/04/2022 11:04 AM    Culture result: NO FUNGUS ISOLATED 2 DAYS 03/04/2022 11:02 AM       Radiology:     Medications       Current Facility-Administered Medications   Medication Dose Route Frequency Last Admin    HYDROmorphone (DILAUDID) tablet 2 mg  2 mg Oral Q4H PRN      alteplase (CATHFLO) 1 mg in sterile water (preservative free) 1 mL injection  1 mg InterCATHeter PRN      lidocaine (XYLOCAINE) 4 % cream   Topical TID Given at 03/09/22 0801    ibuprofen (MOTRIN) tablet 800 mg  800 mg Oral BID  mg at 03/07/22 8960    polyethylene glycol (MIRALAX) packet 17 g  17 g Oral DAILY 17 g at 03/09/22 0821    polyethylene glycol (MIRALAX) packet 17 g  17 g Oral DAILY 17 g at 03/08/22 0825    oxyCODONE-acetaminophen (PERCOCET) 5-325 mg per tablet 1 Tablet  1 Tablet Oral Q4H PRN 1 Tablet at 03/09/22 0537    cilostazoL (PLETAL) tablet 100 mg  100 mg Oral  mg at 03/09/22 0801    [Held by provider] upadacitinib Tb24 15 mg (Patient Supplied)  15 mg Oral QHS 15 mg at 03/04/22 2100    enoxaparin (LOVENOX) injection 40 mg  40 mg SubCUTAneous DAILY 40 mg at 03/09/22 0821    acyclovir (ZOVIRAX) 650 mg in 0.9% sodium chloride 100 mL IVPB  10 mg/kg IntraVENous Q8H 650 mg at 03/09/22 0547    acetaminophen (TYLENOL) tablet 650 mg  650 mg Oral Q4H  mg at 03/03/22 1959    aspirin chewable tablet 81 mg  81 mg Oral DAILY 81 mg at 03/09/22 4641    NIFEdipine ER (PROCARDIA XL) tablet 30 mg  30 mg Oral DAILY 30 mg at 03/08/22 2045           Case discussed with: Hospitalist, case management      Gerardo Ward DO

## 2022-03-09 NOTE — PROGRESS NOTES
[facility]    [facility]      NAME: Vida Castillo   :  1985  MRM:  [id number]    Date/Time of service 3/9/2022  9:53 AM          Assessment and Plan:     Acute myelitis / Acute Varicella Zoster Meningoencephalitis - ID following. Infection due to immunocompromised state. S/p LP. Plan for PICC placement with DC on IV acyclovir likely 3/09/22. RUE Pain and Weakness / Right facial numbness - POA, likely due to myelitis. Completed high dose IV steroids. Neuro following. IV dilaudid PRN.      Rheumatoid arthritis - Holding upadacitinib due to varicella zoster infection. Percocet Prn lidocaine to treat symptom flare while holding upadacitinib. DC on PO dilaudid     Raynaud's - Continue nifedipine, pletal     Tachycardia - Chronic intermittent, now due to high dose steroids. Monitor        Subjective:     Chief Complaint:  weak    ROS:  (bold if positive, if negative)    Tolerating some PT  Tolerating Diet        Objective:     Last 24hrs VS reviewed since prior progress note.  Most recent are:    Visit Vitals  /80 (BP 1 Location: Left upper arm, BP Patient Position: At rest)   Pulse 82   Temp 98.9 °F (37.2 °C)   Resp 17   Ht 5' 3\" (1.6 m)   Wt 66.7 kg (147 lb)   SpO2 98%   BMI 26.04 kg/m²     SpO2 Readings from Last 6 Encounters:   22 98%            Intake/Output Summary (Last 24 hours) at 3/9/2022 0953  Last data filed at 3/9/2022 6269  Gross per 24 hour   Intake 2625 ml   Output --   Net 2625 ml        Physical Exam:    Gen:  Well-developed, well-nourished, in no acute distress  HEENT:  Pink conjunctivae, PERRL, hearing intact to voice, moist mucous membranes  Neck:  Supple, without masses, thyroid non-tender  Resp:  No accessory muscle use, clear breath sounds without wheezes rales or rhonchi  Card:  No murmurs, normal S1, S2 without thrills, bruits or peripheral edema  Abd:  Soft, non-tender, non-distended, normoactive bowel sounds are present, no mass  Lymph:  No cervical or inguinal adenopathy  Musc:  No cyanosis or clubbing  Skin:  No rashes or ulcers, skin turgor is good  Neuro:  Cranial nerves are grossly intact, general motor weakness, follows commands appropriately  Psych:  Good insight, oriented to person, place and time, alert    Telemetry reviewed:   normal sinus rhythm  __________________________________________________________________  Medications Reviewed: (see below)  Medications:     Current Facility-Administered Medications   Medication Dose Route Frequency    HYDROmorphone (DILAUDID) injection 1 mg  1 mg IntraVENous Q4H PRN    alteplase (CATHFLO) 1 mg in sterile water (preservative free) 1 mL injection  1 mg InterCATHeter PRN    lidocaine (XYLOCAINE) 4 % cream   Topical TID    ibuprofen (MOTRIN) tablet 800 mg  800 mg Oral BID PRN    polyethylene glycol (MIRALAX) packet 17 g  17 g Oral DAILY    0.9% sodium chloride infusion  75 mL/hr IntraVENous CONTINUOUS    polyethylene glycol (MIRALAX) packet 17 g  17 g Oral DAILY    oxyCODONE-acetaminophen (PERCOCET) 5-325 mg per tablet 1 Tablet  1 Tablet Oral Q4H PRN    cilostazoL (PLETAL) tablet 100 mg  100 mg Oral ACB    [Held by provider] upadacitinib Tb24 15 mg (Patient Supplied)  15 mg Oral QHS    enoxaparin (LOVENOX) injection 40 mg  40 mg SubCUTAneous DAILY    acyclovir (ZOVIRAX) 650 mg in 0.9% sodium chloride 100 mL IVPB  10 mg/kg IntraVENous Q8H    acetaminophen (TYLENOL) tablet 650 mg  650 mg Oral Q4H PRN    Or    acetaminophen (TYLENOL) solution 650 mg  650 mg Per NG tube Q4H PRN    Or    acetaminophen (TYLENOL) suppository 650 mg  650 mg Rectal Q4H PRN    aspirin chewable tablet 81 mg  81 mg Oral DAILY    NIFEdipine ER (PROCARDIA XL) tablet 30 mg  30 mg Oral DAILY        Lab Data Reviewed: (see below)  Lab Review:     Recent Labs     03/09/22  0533 03/08/22  0219 03/07/22  0126   WBC 13.5* 13.0* 15.1*   HGB 10.7* 9.6* 9.5*   HCT 33.6* 30.2* 29.6*    344 332     Recent Labs 03/09/22  0533 03/08/22  0219 03/07/22  0126    141 138   K 3.9 3.5 3.9    111* 110*   CO2 28 24 25   * 111* 125*   BUN 16 13 16   CREA 0.68 0.56 0.67   CA 8.0* 6.9* 7.4*     Lab Results   Component Value Date/Time    Glucose (POC) 73 03/03/2022 05:40 PM    Glucose (POC) 63 (L) 03/03/2022 05:06 PM    Glucose (POC) 43 (LL) 03/03/2022 04:08 PM    Glucose (POC) 49 (LL) 03/03/2022 04:05 PM     No results for input(s): PH, PCO2, PO2, HCO3, FIO2 in the last 72 hours. No results for input(s): INR, INREXT in the last 72 hours.   All Micro Results     Procedure Component Value Units Date/Time    CULTURE, FUNGUS [[id number]] Collected: 03/04/22 1102    Order Status: Completed Specimen: Cerebrospinal Fluid Updated: 03/07/22 1456     Special Requests: NO SPECIAL REQUESTS        Culture result: NO FUNGUS ISOLATED 2 DAYS       ARY INK PREP [[id number]] Collected: 03/04/22 1101    Order Status: Completed Specimen: Cerebrospinal Fluid Updated: 03/05/22 1252     Special Requests: NO SPECIAL REQUESTS        ARY INK       NO ENCAPSULATED YEAST SEEN          CULTURE, CSF  TUBE 2 [[id number]] Collected: 03/04/22 1104    Order Status: Completed Specimen: Cerebrospinal Fluid Updated: 03/05/22 1033     Special Requests: NO SPECIAL REQUESTS        GRAM STAIN 1+ WBCS SEEN         NO ORGANISMS SEEN               Smear Reviewed by Microbiology           Culture result:       NO GROWTH THUS FAR HOLDING 7 DAYS          MENINGITIS PATHOGENS PANEL, CSF (BY PCR) [[id number]]  (Abnormal) Collected: 03/04/22 1104    Order Status: Completed Specimen: Cerebrospinal Fluid Updated: 03/04/22 2013     Escherichia coli K1 Not detected        Haemophilus Influenzae Not detected        Listeria Monocytogenes Not detected        Neisseria Meningitidis Not detected        Streptococcus Agalactiae Not detected        Streptococcus Pneumoniae Not detected        Cytomegalovirus Not detected        Enterovirus Not detected        Herpes Simplex Virus 1 Not detected        Comment: In patients who have negative herpes simplex 1 and 2 PCR results, do not modify treatment, confirm with alternate testing. Herpes Simplex Virus 2 Not detected        Comment: In patients who have negative herpes simplex 1 and 2 PCR results, do not modify treatment, confirm with alternate testing. Human Herpesvirus 6 Not detected        Human Parechovirus Not detected        Varicella Zoster Virus Detected        Comment: CALLED TO AND READ BACK BY  MS Vianca Reid 1, RN ON 3/4/22 AT 2013. MS          Crypto. neoformans/gattii Not detected       COVID-19 RAPID TEST [[id number]] Collected: 03/04/22 0310    Order Status: Completed Specimen: Nasopharyngeal Updated: 03/04/22 0339     Specimen source Nasopharyngeal        COVID-19 rapid test Not detected        Comment: Rapid Abbott ID Now       Rapid NAAT:  The specimen is NEGATIVE for SARS-CoV-2, the novel coronavirus associated with COVID-19. Negative results should be treated as presumptive and, if inconsistent with clinical signs and symptoms or necessary for patient management, should be tested with an alternative molecular assay. Negative results do not preclude SARS-CoV-2 infection and should not be used as the sole basis for patient management decisions. This test has been authorized by the FDA under an Emergency Use Authorization (EUA) for use by authorized laboratories. Fact sheet for Healthcare Providers: ConventionUpdate.co.nz  Fact sheet for Patients: ConventionUpdate.co.nz       Methodology: Isothermal Nucleic Acid Amplification               Other pertinent lab: none    Total time spent with patient: 30 Minutes I personally reviewed chart, notes, data and current medications in the medical record. I have personally examined and treated the patient at bedside during this period.                  Care Plan discussed with: Patient, Care Manager, Nursing Staff, Consultant/Specialist and >50% of time spent in counseling and coordination of care    Discussed:  Care Plan and D/C Planning    Prophylaxis:  H2B/PPI    Disposition:  Home w/Family           ___________________________________________________    Attending Physician: Mellissa Garg MD

## 2022-03-11 LAB
BACTERIA SPEC CULT: NORMAL
GRAM STN SPEC: NORMAL
SERVICE CMNT-IMP: NORMAL

## 2022-03-14 ENCOUNTER — HOSPITAL ENCOUNTER (EMERGENCY)
Age: 37
Discharge: HOME | End: 2022-03-14
Attending: STUDENT IN AN ORGANIZED HEALTH CARE EDUCATION/TRAINING PROGRAM
Payer: COMMERCIAL

## 2022-03-14 VITALS
OXYGEN SATURATION: 98 % | HEART RATE: 113 BPM | DIASTOLIC BLOOD PRESSURE: 83 MMHG | TEMPERATURE: 98.6 F | HEIGHT: 64 IN | SYSTOLIC BLOOD PRESSURE: 128 MMHG | BODY MASS INDEX: 25.1 KG/M2 | RESPIRATION RATE: 20 BRPM | WEIGHT: 147 LBS

## 2022-03-14 DIAGNOSIS — Z95.828 STATUS POST PERIPHERALLY INSERTED CENTRAL CATHETER (PICC) CENTRAL LINE PLACEMENT: Primary | ICD-10-CM

## 2022-03-14 DIAGNOSIS — M79.2 NEUROPATHIC PAIN: ICD-10-CM

## 2022-03-14 PROCEDURE — 74011250637 HC RX REV CODE- 250/637: Performed by: STUDENT IN AN ORGANIZED HEALTH CARE EDUCATION/TRAINING PROGRAM

## 2022-03-14 PROCEDURE — 99283 EMERGENCY DEPT VISIT LOW MDM: CPT

## 2022-03-14 RX ORDER — OXYCODONE HYDROCHLORIDE 5 MG/1
5-10 TABLET ORAL
Qty: 28 TABLET | Refills: 0 | Status: SHIPPED | OUTPATIENT
Start: 2022-03-14 | End: 2022-03-25

## 2022-03-14 RX ORDER — OXYCODONE HYDROCHLORIDE 5 MG/1
10 TABLET ORAL
Status: COMPLETED | OUTPATIENT
Start: 2022-03-14 | End: 2022-03-14

## 2022-03-14 RX ADMIN — OXYCODONE 10 MG: 5 TABLET ORAL at 14:32

## 2022-03-14 NOTE — ED PROVIDER NOTES
Patient is a 60-year-old female with recent CSF infection on outpatient antivirals through PICC line presented to ED with concern about PICC line malfunction and pain in the right arm. Patient denies fevers. Patient has significant pain on the right side of her body from the shingles infection. Line is flushing well and they have been able to infuse her medications as needed at home. The history is provided by the patient and the spouse. Vascular Access Problem   This is a new problem. The current episode started more than 2 days ago. The problem occurs constantly. The problem has not changed since onset. The pain is present in the right arm. The quality of the pain is described as aching. The pain is at a severity of 6/10. The pain is moderate. The symptoms are aggravated by palpation. She has tried nothing for the symptoms. The treatment provided no relief. There has been no history of extremity trauma. Past Medical History:   Diagnosis Date    History of vascular access device 03/08/2022    [facility] VAT: 4 FR Single lumen PICC Right brachial for LT ABX therapy; Length 35 CM Arm circ 31 CM. Verified with Max P @ 35 CM    Raynaud's disease     Rheumatoid arthritis ([facility] [address]        No past surgical history on file. No family history on file.     Social History     Socioeconomic History    Marital status:      Spouse name: Not on file    Number of children: Not on file    Years of education: Not on file    Highest education level: Not on file   Occupational History    Not on file   Tobacco Use    Smoking status: Not on file    Smokeless tobacco: Not on file   Substance and Sexual Activity    Alcohol use: Not on file    Drug use: Not on file    Sexual activity: Not on file   Other Topics Concern     Service Not Asked    Blood Transfusions Not Asked    Caffeine Concern Not Asked    Occupational Exposure Not Asked    Hobby Hazards Not Asked    Sleep Concern Not Asked    Stress Concern Not Asked    Weight Concern Not Asked    Special Diet Not Asked    Back Care Not Asked    Exercise Not Asked    Bike Helmet Not Asked   [address] Not Asked    Self-Exams Not Asked   Social History Narrative    Not on file     Social Determinants of Health     Financial Resource Strain:     Difficulty of Paying Living Expenses: Not on file   Food Insecurity:     Worried About Running Out of Food in the Last Year: Not on file    Vick of Food in the Last Year: Not on file   Transportation Needs:     Lack of Transportation (Medical): Not on file    Lack of Transportation (Non-Medical): Not on file   Physical Activity:     Days of Exercise per Week: Not on file    Minutes of Exercise per Session: Not on file   Stress:     Feeling of Stress : Not on file   Social Connections:     Frequency of Communication with Friends and Family: Not on file    Frequency of Social Gatherings with Friends and Family: Not on file    Attends Jain Services: Not on file    Active Member of [address] [place] [address] [employer] or Organizations: Not on file    Attends Club or Organization Meetings: Not on file    Marital Status: Not on file   Intimate Partner Violence:     Fear of Current or Ex-Partner: Not on file    Emotionally Abused: Not on file    Physically Abused: Not on file    Sexually Abused: Not on file   Housing Stability:     Unable to Pay for Housing in the Last Year: Not on file    Number of [place] in the Last Year: Not on file    Unstable Housing in the Last Year: Not on file         ALLERGIES: Patient has no known allergies. Review of Systems   Constitutional: Negative. HENT: Negative. Eyes: Negative. Respiratory: Negative. Cardiovascular: Negative. Gastrointestinal: Negative. Endocrine: Negative. Genitourinary: Negative. Musculoskeletal: Positive for myalgias. Skin: Negative. Allergic/Immunologic: Negative. Neurological: Negative. Hematological: Negative. Psychiatric/Behavioral: Negative. Vitals:    03/14/22 1148   BP: 128/83   Pulse: (!) 113   Resp: 20   Temp: 98.6 °F (37 °C)   SpO2: 98%   Weight: 66.7 kg (147 lb)   Height: 5' 4\" (1.626 m)            Physical Exam  Vitals and nursing note reviewed. Constitutional:       General: She is not in acute distress. Appearance: Normal appearance. HENT:      Head: Normocephalic and atraumatic. Right Ear: External ear normal.      Left Ear: External ear normal.      Nose: Nose normal.   Eyes:      Extraocular Movements: Extraocular movements intact. Conjunctiva/sclera: Conjunctivae normal.   Cardiovascular:      Rate and Rhythm: Normal rate. Pulses: Normal pulses. Radial pulses are 2+ on the right side and 2+ on the left side. Heart sounds: Normal heart sounds. Pulmonary:      Effort: Pulmonary effort is normal.      Breath sounds: Normal breath sounds. Chest:      Chest wall: No deformity or tenderness. Abdominal:      General: Abdomen is flat. There is no distension. Tenderness: There is no abdominal tenderness. Musculoskeletal:         General: No deformity or signs of injury. Normal range of motion. Cervical back: Normal range of motion and neck supple. No tenderness. Comments: Patient has PICC line on the anterior aspect of the right upper arm. No inflammation, erythema. Vascular access team assessed the arm and PICC line all elements are within normal limits. No signs of infection, thrombophlebitis. Skin:     General: Skin is warm and dry. Capillary Refill: Capillary refill takes less than 2 seconds. Neurological:      General: No focal deficit present. Mental Status: She is alert and oriented to person, place, and time.    Psychiatric:         Attention and Perception: Attention normal.         Mood and Affect: Mood normal.         Behavior: Behavior normal.          MDM       MEDICATIONS GIVEN:  Medications   oxyCODONE IR (ROXICODONE) tablet 10 mg (10 mg Oral Given 3/14/22 6112)       Differential diagnosis: PICC line malfunction, thrombophlebitis, neuropathic pain from shingles/zoster infection    MDM: Patient is a 35-year-old female presented to the ED with concern about her PICC line. Physical exam is unremarkable. Vascular access team evaluate the line is fully functional.  Most likely patient's pain is due to her neuropathy from systemics zoster infection. Patient has been taking Dilaudid without improvement. Switch patient to Roxicodone with improvement in pain. Patient room for the same. Patient instructed to follow-up closely with her PCP. Key discharge instructions and summary of care: You presented to the ED with concern about your PICC line. The PICC line team evaluated you and found that there is no signs of infection or obstruction. No signs of thrombophlebitis at this time. Your pain continues to be significant and therefore oxycodone may release was tried. This seems to have better pain control than your Dilaudid. Please discontinued her Dilaudid and start taking the prescribed oxycodone sent to your pharmacy. Attempt to use Tylenol as well as your gabapentin for adjuncts to your pain. The patient has been re-evaluated and feeling better. Patient is stable for discharge. All available radiology and laboratory results have been reviewed with patient and/or available family. Patient and/or family verbally conveyed their understanding and agreement of the patient's signs, symptoms, diagnosis, treatment and prognosis and additionally agree to follow-up as recommended in the discharge instructions or to return to the Emergency Department should their condition change or worsen prior to their follow-up appointment. All questions have been answered and patient and/or available family express understanding. IMPRESSION:  1.  Status post peripherally inserted central catheter (PICC) central line placement    2. Neuropathic pain        DISPOSITION: Discharged    Moises Sosa MD        Procedures

## 2022-03-14 NOTE — DISCHARGE INSTRUCTIONS
You presented to the ED with concern about your PICC line. The PICC line team evaluated you and found that there is no signs of infection or obstruction. No signs of thrombophlebitis at this time. Your pain continues to be significant and therefore oxycodone may release was tried. This seems to have better pain control than your Dilaudid. Please discontinued her Dilaudid and start taking the prescribed oxycodone sent to your pharmacy. Attempt to use Tylenol as well as your gabapentin for adjuncts to your pain.

## 2022-03-14 NOTE — ED TRIAGE NOTES
Patient arrives with c/o right arm pain, and swelling x 4 days following PICC line placed last Wednesday. Patient was diagnosed with Shingles and Viral Meningitis last week and admitted to [facility]. Pt discharged last Wednesday. Reports noticed increased pain and difficulty administering IV antibiotics into PICC Line 4 days ago. Denies N/V, fever.

## 2022-03-14 NOTE — PROGRESS NOTES
VAT team consult to evaluate PICC, patient presented to ER with C/O pain and questionable swelling RUE. PICC line placed by this VAT 3/8/2022. There is no visible swelling RUE, circumference measurement is 31 CM today, same measurement as when placed. Line with quick vigorous blood return and flushes very well with 10 ml of NSS. There was a small kink in line distal to insertion site near port, easily straightened. A sterile dressing change performed by Anali Delgado RN. The insertion site is clear clean and dry, no redness or drainage, no swelling. New Biopatch and stat lock applied. Patient and spouse instructed on how to measure for swelling at site, advised there is no swelling present today and showed the 31 CM measurement marking and where to measure, both voiced good understanding. Dr. Sharyle Hazel advised of PICC evaluation findings.

## 2022-03-18 ENCOUNTER — APPOINTMENT (OUTPATIENT)
Dept: VASCULAR SURGERY | Age: 37
DRG: 864 | End: 2022-03-18
Attending: INTERNAL MEDICINE
Payer: COMMERCIAL

## 2022-03-18 ENCOUNTER — APPOINTMENT (OUTPATIENT)
Dept: GENERAL RADIOLOGY | Age: 37
DRG: 864 | End: 2022-03-18
Attending: EMERGENCY MEDICINE
Payer: COMMERCIAL

## 2022-03-18 ENCOUNTER — APPOINTMENT (OUTPATIENT)
Dept: CT IMAGING | Age: 37
DRG: 864 | End: 2022-03-18
Attending: INTERNAL MEDICINE
Payer: COMMERCIAL

## 2022-03-18 ENCOUNTER — HOSPITAL ENCOUNTER (INPATIENT)
Age: 37
LOS: 7 days | Discharge: HOME | DRG: 864 | End: 2022-03-25
Attending: EMERGENCY MEDICINE | Admitting: INTERNAL MEDICINE
Payer: COMMERCIAL

## 2022-03-18 DIAGNOSIS — M79.2 NEUROPATHIC PAIN: ICD-10-CM

## 2022-03-18 DIAGNOSIS — B34.9: ICD-10-CM

## 2022-03-18 DIAGNOSIS — M79.2 NEUROPATHIC PAIN, ARM: ICD-10-CM

## 2022-03-18 DIAGNOSIS — R20.0 RIGHT FACIAL NUMBNESS: ICD-10-CM

## 2022-03-18 DIAGNOSIS — B01.11 VARICELLA ENCEPHALITIS: ICD-10-CM

## 2022-03-18 DIAGNOSIS — G37.3: ICD-10-CM

## 2022-03-18 DIAGNOSIS — R50.9 FEVER AND CHILLS: ICD-10-CM

## 2022-03-18 DIAGNOSIS — A41.9 SEPSIS, DUE TO UNSPECIFIED ORGANISM, UNSPECIFIED WHETHER ACUTE ORGAN DYSFUNCTION PRESENT (HCC): Primary | ICD-10-CM

## 2022-03-18 DIAGNOSIS — G04.91 ACUTE MYELITIS (HCC): ICD-10-CM

## 2022-03-18 DIAGNOSIS — D72.829 LEUKOCYTOSIS, UNSPECIFIED TYPE: ICD-10-CM

## 2022-03-18 DIAGNOSIS — M54.2 NECK PAIN: ICD-10-CM

## 2022-03-18 DIAGNOSIS — R00.0 TACHYCARDIA: ICD-10-CM

## 2022-03-18 PROBLEM — G03.9 MENINGITIS: Status: ACTIVE | Noted: 2022-03-18

## 2022-03-18 LAB
ALBUMIN SERPL-MCNC: 3.2 G/DL (ref 3.5–5)
ALBUMIN/GLOB SERPL: 0.6 {RATIO} (ref 1.1–2.2)
ALP SERPL-CCNC: 52 U/L (ref 45–117)
ALT SERPL-CCNC: 41 U/L (ref 12–78)
ANION GAP SERPL CALC-SCNC: 6 MMOL/L (ref 5–15)
APPEARANCE CSF: ABNORMAL
APPEARANCE CSF: ABNORMAL
APPEARANCE UR: ABNORMAL
AST SERPL-CCNC: 58 U/L (ref 15–37)
B PERT DNA SPEC QL NAA+PROBE: NOT DETECTED
BACTERIA URNS QL MICRO: NEGATIVE /HPF
BASOPHILS # BLD: 0 K/UL (ref 0–0.1)
BASOPHILS NFR BLD: 0 % (ref 0–1)
BILIRUB SERPL-MCNC: 1.2 MG/DL (ref 0.2–1)
BILIRUB UR QL: NEGATIVE
BORDETELLA PARAPERTUSSIS PCR, BORPAR: NOT DETECTED
BUN SERPL-MCNC: 8 MG/DL (ref 6–20)
BUN/CREAT SERPL: 8 (ref 12–20)
C PNEUM DNA SPEC QL NAA+PROBE: NOT DETECTED
CALCIUM SERPL-MCNC: 9.3 MG/DL (ref 8.5–10.1)
CHLORIDE SERPL-SCNC: 103 MMOL/L (ref 97–108)
CO2 SERPL-SCNC: 24 MMOL/L (ref 21–32)
COLOR CSF: ABNORMAL
COLOR CSF: ABNORMAL
COLOR SPUN CSF: COLORLESS
COLOR SPUN CSF: COLORLESS
COLOR UR: ABNORMAL
COMMENT, HOLDF: NORMAL
CREAT SERPL-MCNC: 0.95 MG/DL (ref 0.55–1.02)
D DIMER PPP FEU-MCNC: 1.04 MG/L FEU (ref 0–0.65)
DIFFERENTIAL METHOD BLD: ABNORMAL
EOSINOPHIL # BLD: 0.2 K/UL (ref 0–0.4)
EOSINOPHIL NFR BLD: 1 % (ref 0–7)
EOSINOPHIL NFR CSF MANUAL: 2 %
EPITH CASTS URNS QL MICRO: ABNORMAL /LPF
ERYTHROCYTE [DISTWIDTH] IN BLOOD BY AUTOMATED COUNT: 17.2 % (ref 11.5–14.5)
FLUAV H1 2009 PAND RNA SPEC QL NAA+PROBE: NOT DETECTED
FLUAV H1 RNA SPEC QL NAA+PROBE: NOT DETECTED
FLUAV H3 RNA SPEC QL NAA+PROBE: NOT DETECTED
FLUAV SUBTYP SPEC NAA+PROBE: NOT DETECTED
FLUBV RNA SPEC QL NAA+PROBE: NOT DETECTED
GLOBULIN SER CALC-MCNC: 5.1 G/DL (ref 2–4)
GLUCOSE CSF-MCNC: 59 MG/DL (ref 40–70)
GLUCOSE SERPL-MCNC: 91 MG/DL (ref 65–100)
GLUCOSE UR STRIP.AUTO-MCNC: NEGATIVE MG/DL
HADV DNA SPEC QL NAA+PROBE: NOT DETECTED
HCOV 229E RNA SPEC QL NAA+PROBE: NOT DETECTED
HCOV HKU1 RNA SPEC QL NAA+PROBE: NOT DETECTED
HCOV NL63 RNA SPEC QL NAA+PROBE: NOT DETECTED
HCOV OC43 RNA SPEC QL NAA+PROBE: NOT DETECTED
HCT VFR BLD AUTO: 35.7 % (ref 35–47)
HGB BLD-MCNC: 11.4 G/DL (ref 11.5–16)
HGB UR QL STRIP: NEGATIVE
HMPV RNA SPEC QL NAA+PROBE: NOT DETECTED
HPIV1 RNA SPEC QL NAA+PROBE: NOT DETECTED
HPIV2 RNA SPEC QL NAA+PROBE: NOT DETECTED
HPIV3 RNA SPEC QL NAA+PROBE: NOT DETECTED
HPIV4 RNA SPEC QL NAA+PROBE: NOT DETECTED
HYALINE CASTS URNS QL MICRO: ABNORMAL /LPF (ref 0–2)
IMM GRANULOCYTES # BLD AUTO: 0.1 K/UL (ref 0–0.04)
IMM GRANULOCYTES NFR BLD AUTO: 1 % (ref 0–0.5)
KETONES UR QL STRIP.AUTO: NEGATIVE MG/DL
LACTATE SERPL-SCNC: 1.2 MMOL/L (ref 0.4–2)
LEUKOCYTE ESTERASE UR QL STRIP.AUTO: ABNORMAL
LYMPHOCYTES # BLD: 1 K/UL (ref 0.8–3.5)
LYMPHOCYTES NFR BLD: 6 % (ref 12–49)
LYMPHOCYTES NFR CSF MANUAL: 33 % (ref 28–96)
M PNEUMO DNA SPEC QL NAA+PROBE: NOT DETECTED
MCH RBC QN AUTO: 28 PG (ref 26–34)
MCHC RBC AUTO-ENTMCNC: 31.9 G/DL (ref 30–36.5)
MCV RBC AUTO: 87.7 FL (ref 80–99)
MONOCYTES # BLD: 0.7 K/UL (ref 0–1)
MONOCYTES NFR BLD: 4 % (ref 5–13)
MONOCYTES NFR CSF MANUAL: 2 % (ref 16–56)
NEUTROPHILS NFR CSF MANUAL: 63 % (ref 0–7)
NEUTS SEG # BLD: 14.5 K/UL (ref 1.8–8)
NEUTS SEG NFR BLD: 88 % (ref 32–75)
NITRITE UR QL STRIP.AUTO: NEGATIVE
NRBC # BLD: 0 K/UL (ref 0–0.01)
NRBC BLD-RTO: 0 PER 100 WBC
PH UR STRIP: 6 [PH] (ref 5–8)
PLATELET # BLD AUTO: 256 K/UL (ref 150–400)
PMV BLD AUTO: 8.6 FL (ref 8.9–12.9)
POTASSIUM SERPL-SCNC: 5.3 MMOL/L (ref 3.5–5.1)
PROT CSF-MCNC: 51 MG/DL (ref 15–45)
PROT SERPL-MCNC: 8.3 G/DL (ref 6.4–8.2)
PROT UR STRIP-MCNC: NEGATIVE MG/DL
RBC # BLD AUTO: 4.07 M/UL (ref 3.8–5.2)
RBC # CSF: 2575 /CU MM
RBC # CSF: 6020 /CU MM
RBC #/AREA URNS HPF: ABNORMAL /HPF (ref 0–5)
RSV RNA SPEC QL NAA+PROBE: NOT DETECTED
RV+EV RNA SPEC QL NAA+PROBE: NOT DETECTED
SAMPLES BEING HELD,HOLD: NORMAL
SARS-COV-2 PCR, COVPCR: NOT DETECTED
SODIUM SERPL-SCNC: 133 MMOL/L (ref 136–145)
SP GR UR REFRACTOMETRY: 1.01 (ref 1–1.03)
TUBE # CSF: 1
TUBE # CSF: 2
TUBE # CSF: 2
TUBE # CSF: 4
UROBILINOGEN UR QL STRIP.AUTO: 0.2 EU/DL (ref 0.2–1)
WBC # BLD AUTO: 16.5 K/UL (ref 3.6–11)
WBC # CSF: 13 /CU MM (ref 0–5)
WBC # CSF: 4 /CU MM (ref 0–5)
WBC URNS QL MICRO: ABNORMAL /HPF (ref 0–4)

## 2022-03-18 PROCEDURE — 82945 GLUCOSE OTHER FLUID: CPT

## 2022-03-18 PROCEDURE — 71275 CT ANGIOGRAPHY CHEST: CPT

## 2022-03-18 PROCEDURE — 93005 ELECTROCARDIOGRAM TRACING: CPT

## 2022-03-18 PROCEDURE — 77030014132 HC TY LUMBR PUNC BD -A

## 2022-03-18 PROCEDURE — 87483 CNS DNA AMP PROBE TYPE 12-25: CPT

## 2022-03-18 PROCEDURE — 36415 COLL VENOUS BLD VENIPUNCTURE: CPT

## 2022-03-18 PROCEDURE — 62328 DX LMBR SPI PNXR W/FLUOR/CT: CPT

## 2022-03-18 PROCEDURE — 83605 ASSAY OF LACTIC ACID: CPT

## 2022-03-18 PROCEDURE — 74011000258 HC RX REV CODE- 258: Performed by: EMERGENCY MEDICINE

## 2022-03-18 PROCEDURE — 93970 EXTREMITY STUDY: CPT

## 2022-03-18 PROCEDURE — 009U3ZX DRAINAGE OF SPINAL CANAL, PERCUTANEOUS APPROACH, DIAGNOSTIC: ICD-10-PCS | Performed by: STUDENT IN AN ORGANIZED HEALTH CARE EDUCATION/TRAINING PROGRAM

## 2022-03-18 PROCEDURE — 74011250637 HC RX REV CODE- 250/637: Performed by: INTERNAL MEDICINE

## 2022-03-18 PROCEDURE — 84157 ASSAY OF PROTEIN OTHER: CPT

## 2022-03-18 PROCEDURE — 81001 URINALYSIS AUTO W/SCOPE: CPT

## 2022-03-18 PROCEDURE — 65660000000 HC RM CCU STEPDOWN

## 2022-03-18 PROCEDURE — 74011000250 HC RX REV CODE- 250: Performed by: EMERGENCY MEDICINE

## 2022-03-18 PROCEDURE — 87086 URINE CULTURE/COLONY COUNT: CPT

## 2022-03-18 PROCEDURE — 93971 EXTREMITY STUDY: CPT

## 2022-03-18 PROCEDURE — 87040 BLOOD CULTURE FOR BACTERIA: CPT

## 2022-03-18 PROCEDURE — 86738 MYCOPLASMA ANTIBODY: CPT

## 2022-03-18 PROCEDURE — 71045 X-RAY EXAM CHEST 1 VIEW: CPT

## 2022-03-18 PROCEDURE — 77030003666 HC NDL SPINAL BD -A

## 2022-03-18 PROCEDURE — 0202U NFCT DS 22 TRGT SARS-COV-2: CPT

## 2022-03-18 PROCEDURE — 74011000250 HC RX REV CODE- 250: Performed by: INTERNAL MEDICINE

## 2022-03-18 PROCEDURE — 87205 SMEAR GRAM STAIN: CPT

## 2022-03-18 PROCEDURE — 99285 EMERGENCY DEPT VISIT HI MDM: CPT

## 2022-03-18 PROCEDURE — 87899 AGENT NOS ASSAY W/OPTIC: CPT

## 2022-03-18 PROCEDURE — 86788 WEST NILE VIRUS AB IGM: CPT

## 2022-03-18 PROCEDURE — 74011250636 HC RX REV CODE- 250/636: Performed by: INTERNAL MEDICINE

## 2022-03-18 PROCEDURE — 96375 TX/PRO/DX INJ NEW DRUG ADDON: CPT

## 2022-03-18 PROCEDURE — 74011250636 HC RX REV CODE- 250/636: Performed by: EMERGENCY MEDICINE

## 2022-03-18 PROCEDURE — 87449 NOS EACH ORGANISM AG IA: CPT

## 2022-03-18 PROCEDURE — 96374 THER/PROPH/DIAG INJ IV PUSH: CPT

## 2022-03-18 PROCEDURE — 85025 COMPLETE CBC W/AUTO DIFF WBC: CPT

## 2022-03-18 PROCEDURE — 85379 FIBRIN DEGRADATION QUANT: CPT

## 2022-03-18 PROCEDURE — 74011000636 HC RX REV CODE- 636: Performed by: RADIOLOGY

## 2022-03-18 PROCEDURE — 99233 SBSQ HOSP IP/OBS HIGH 50: CPT | Performed by: INTERNAL MEDICINE

## 2022-03-18 PROCEDURE — 89050 BODY FLUID CELL COUNT: CPT

## 2022-03-18 PROCEDURE — 80053 COMPREHEN METABOLIC PANEL: CPT

## 2022-03-18 RX ORDER — LIDOCAINE HYDROCHLORIDE 10 MG/ML
10 INJECTION, SOLUTION EPIDURAL; INFILTRATION; INTRACAUDAL; PERINEURAL
Status: DISPENSED | OUTPATIENT
Start: 2022-03-18 | End: 2022-03-19

## 2022-03-18 RX ORDER — ONDANSETRON 4 MG/1
4 TABLET, ORALLY DISINTEGRATING ORAL
Status: DISCONTINUED | OUTPATIENT
Start: 2022-03-18 | End: 2022-03-25 | Stop reason: HOSPADM

## 2022-03-18 RX ORDER — VANCOMYCIN/0.9 % SOD CHLORIDE 1.5G/250ML
1500 PLASTIC BAG, INJECTION (ML) INTRAVENOUS ONCE
Status: COMPLETED | OUTPATIENT
Start: 2022-03-18 | End: 2022-03-18

## 2022-03-18 RX ORDER — CILOSTAZOL 100 MG/1
100 TABLET ORAL DAILY
Status: DISCONTINUED | OUTPATIENT
Start: 2022-03-19 | End: 2022-03-25 | Stop reason: HOSPADM

## 2022-03-18 RX ORDER — ONDANSETRON 2 MG/ML
4 INJECTION INTRAMUSCULAR; INTRAVENOUS
Status: DISCONTINUED | OUTPATIENT
Start: 2022-03-18 | End: 2022-03-25 | Stop reason: HOSPADM

## 2022-03-18 RX ORDER — HYDROMORPHONE HYDROCHLORIDE 1 MG/ML
1 INJECTION, SOLUTION INTRAMUSCULAR; INTRAVENOUS; SUBCUTANEOUS
Status: DISCONTINUED | OUTPATIENT
Start: 2022-03-18 | End: 2022-03-25 | Stop reason: HOSPADM

## 2022-03-18 RX ORDER — LIDOCAINE 40 MG/G
CREAM TOPICAL
Status: DISCONTINUED | OUTPATIENT
Start: 2022-03-18 | End: 2022-03-25 | Stop reason: HOSPADM

## 2022-03-18 RX ORDER — POLYETHYLENE GLYCOL 3350 17 G/17G
17 POWDER, FOR SOLUTION ORAL DAILY PRN
Status: DISCONTINUED | OUTPATIENT
Start: 2022-03-18 | End: 2022-03-25 | Stop reason: HOSPADM

## 2022-03-18 RX ORDER — ACETAMINOPHEN 325 MG/1
650 TABLET ORAL
Status: DISCONTINUED | OUTPATIENT
Start: 2022-03-18 | End: 2022-03-25 | Stop reason: HOSPADM

## 2022-03-18 RX ORDER — ACETAMINOPHEN 650 MG/1
650 SUPPOSITORY RECTAL
Status: DISCONTINUED | OUTPATIENT
Start: 2022-03-18 | End: 2022-03-25 | Stop reason: HOSPADM

## 2022-03-18 RX ORDER — SODIUM CHLORIDE 0.9 % (FLUSH) 0.9 %
5-10 SYRINGE (ML) INJECTION AS NEEDED
Status: DISCONTINUED | OUTPATIENT
Start: 2022-03-18 | End: 2022-03-25 | Stop reason: HOSPADM

## 2022-03-18 RX ORDER — SODIUM CHLORIDE 0.9 % (FLUSH) 0.9 %
5-40 SYRINGE (ML) INJECTION EVERY 8 HOURS
Status: DISCONTINUED | OUTPATIENT
Start: 2022-03-18 | End: 2022-03-25 | Stop reason: HOSPADM

## 2022-03-18 RX ORDER — NIFEDIPINE 30 MG/1
30 TABLET, EXTENDED RELEASE ORAL DAILY
Status: DISCONTINUED | OUTPATIENT
Start: 2022-03-19 | End: 2022-03-25 | Stop reason: HOSPADM

## 2022-03-18 RX ORDER — POLYETHYLENE GLYCOL 3350 17 G/17G
17 POWDER, FOR SOLUTION ORAL DAILY
Status: DISCONTINUED | OUTPATIENT
Start: 2022-03-19 | End: 2022-03-25 | Stop reason: HOSPADM

## 2022-03-18 RX ORDER — OXYCODONE HYDROCHLORIDE 5 MG/1
10 TABLET ORAL
Status: DISCONTINUED | OUTPATIENT
Start: 2022-03-18 | End: 2022-03-25 | Stop reason: HOSPADM

## 2022-03-18 RX ORDER — DOXYCYCLINE HYCLATE 100 MG
100 TABLET ORAL EVERY 12 HOURS
Status: DISCONTINUED | OUTPATIENT
Start: 2022-03-18 | End: 2022-03-21

## 2022-03-18 RX ORDER — SODIUM CHLORIDE 9 MG/ML
150 INJECTION, SOLUTION INTRAVENOUS CONTINUOUS
Status: DISPENSED | OUTPATIENT
Start: 2022-03-18 | End: 2022-03-19

## 2022-03-18 RX ORDER — SODIUM CHLORIDE 0.9 % (FLUSH) 0.9 %
5-40 SYRINGE (ML) INJECTION AS NEEDED
Status: DISCONTINUED | OUTPATIENT
Start: 2022-03-18 | End: 2022-03-25 | Stop reason: HOSPADM

## 2022-03-18 RX ORDER — GUAIFENESIN 100 MG/5ML
81 LIQUID (ML) ORAL DAILY
Status: DISCONTINUED | OUTPATIENT
Start: 2022-03-19 | End: 2022-03-25 | Stop reason: HOSPADM

## 2022-03-18 RX ADMIN — DOXYCYCLINE HYCLATE 100 MG: 100 TABLET, COATED ORAL at 22:45

## 2022-03-18 RX ADMIN — SODIUM CHLORIDE 50 ML/HR: 9 INJECTION, SOLUTION INTRAVENOUS at 21:14

## 2022-03-18 RX ADMIN — ACYCLOVIR SODIUM 650 MG: 50 INJECTION, SOLUTION INTRAVENOUS at 16:22

## 2022-03-18 RX ADMIN — ONDANSETRON 4 MG: 2 INJECTION INTRAMUSCULAR; INTRAVENOUS at 22:48

## 2022-03-18 RX ADMIN — IOPAMIDOL 80 ML: 755 INJECTION, SOLUTION INTRAVENOUS at 20:43

## 2022-03-18 RX ADMIN — SODIUM CHLORIDE, PRESERVATIVE FREE 10 ML: 5 INJECTION INTRAVENOUS at 18:20

## 2022-03-18 RX ADMIN — SODIUM CHLORIDE 905 ML: 9 INJECTION, SOLUTION INTRAVENOUS at 16:22

## 2022-03-18 RX ADMIN — SODIUM CHLORIDE 1000 ML: 9 INJECTION, SOLUTION INTRAVENOUS at 16:22

## 2022-03-18 RX ADMIN — CEFTRIAXONE 2 G: 2 INJECTION, POWDER, FOR SOLUTION INTRAMUSCULAR; INTRAVENOUS at 17:31

## 2022-03-18 RX ADMIN — OXYCODONE 10 MG: 5 TABLET ORAL at 21:28

## 2022-03-18 RX ADMIN — ACYCLOVIR SODIUM 650 MG: 50 INJECTION, SOLUTION INTRAVENOUS at 22:46

## 2022-03-18 RX ADMIN — VANCOMYCIN HYDROCHLORIDE 1500 MG: 10 INJECTION, POWDER, LYOPHILIZED, FOR SOLUTION INTRAVENOUS at 17:57

## 2022-03-18 NOTE — PROGRESS NOTES
[facility] [place] [facility] Progress Note           Afia Reid DO    [phone] Office  [phone]  Fax    3/18/2022      Assessment & Plan:   1. Febrile illness with leukocytosis. Concern for PICC line related infection versus (less likely) pneumonia with abnormal chest x-ray versus other. Blood cultures have been sent. Will check chest PA and lateral for better view of the chest.  Check respiratory virus panel and pneumonia serologies   2. History of varicella Zoster Meningoencephalitis/myelitis. On day 14 of intravenous acyclovir therapy with minimal improvement in right neck pain/pressure and numbness. LP attempted today in the ER which was unsuccessful. Recommend IR consultation for LP due to lack of significant improvement of symptoms. Also recommend neurology evaluation  3. Rheumatoid arthritis. Was on upadacitinib which was stopped at previous admission  4. Chronic immunosuppression. Due to above  5. 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 With no IgM isolated this likely represents a false positive or previous infection. Repeat WNV testing with repeat LP           Subjective:     Patient seen in follow-up in clinic today for VZV meningoencephalitis/myelitis. On approximately day 14 of IV acyclovir with minimal improvement of left-sided neck pressure/pain. Has developed fevers as high as 101 °F over the last several days. Vital signs in clinic revealed a heart rate in the 140s. Patient sent to ER for further evaluation    Objective:     Vitals:   Visit Vitals  /71   Pulse 89   Temp 99.4 °F (37.4 °C)   Resp 16   Wt 140 lb (63.5 kg)   SpO2 100%   BMI 24.03 kg/m²        Tmax:  Temp (24hrs), Av.3 °F (36.8 °C), Min:97.1 °F (36.2 °C), Max:99.4 °F (37.4 °C)      Exam:   Patient is intubated:  no    Physical Examination:   General:  Alert, cooperative, no distress   Head:  Normocephalic, atraumatic.    Eyes:  Conjunctivae clear   Neck: Supple Lungs:   No distress. Chest wall:     Heart:   Tachycardia   Abdomen:    Nondistended   Extremities: Moves all. RUE PICC line unremarkable. Skin:  No rash   Neurologic: CNII-XII intact. Normal strength     Labs:        No lab exists for component: ITNL   No results for input(s): CPK, CKMB, TROIQ in the last 72 hours. Recent Labs     03/18/22  1555   *   K 5.3*      CO2 24   BUN 8   CREA 0.95   GLU 91   ALB 3.2*   WBC 16.5*   HGB 11.4*   HCT 35.7        No results for input(s): INR, PTP, APTT, INREXT in the last 72 hours. Needs: urine analysis, urine sodium, protein and creatinine  No results found for: SRINIVAS, CREAU      Cultures:     No results found for: SDES  Lab Results   Component Value Date/Time    Culture result: NO FUNGUS ISOLATED 9 DAYS 03/04/2022 12:02 PM    Culture result: NO GROWTH 7 DAYS 03/04/2022 11:04 AM       Radiology:     Medications       Current Facility-Administered Medications   Medication Dose Route Frequency Last Admin    sodium chloride (NS) flush 5-10 mL  5-10 mL IntraVENous PRN      acyclovir (ZOVIRAX) 650 mg in 0.9% sodium chloride 100 mL IVPB  10 mg/kg IntraVENous Q8H 650 mg at 03/18/22 1622    cefTRIAXone (ROCEPHIN) 2 g in 0.9% sodium chloride 20 mL IV syringe  2 g IntraVENous NOW       Current Outpatient Medications   Medication Sig Dispense    oxyCODONE IR (Roxicodone) 5 mg immediate release tablet Take 1-2 Tablets by mouth every six (6) hours as needed for Pain (Take 1 tablet for moderate pain and 2 tabs for severe pain) for up to 5 days. Max Daily Amount: 40 mg. 28 Tablet    acyclovir 667 mg 667 mg by IntraVENous route every eight (8) hours for 22 days. 66 Dose    aspirin 81 mg chewable tablet Take 1 Tablet by mouth daily for 30 days. (Patient not taking: Reported on 3/18/2022) 30 Tablet    lidocaine (XYLOCAINE) 4 % topical cream Apply  to affected area two (2) times daily as needed for Pain for up to 30 days.  15 g    polyethylene glycol (MIRALAX) 17 gram packet Take 1 Packet by mouth daily for 30 days. 30 Packet    gabapentin (NEURONTIN) 100 mg capsule Take 1 Capsule by mouth three (3) times daily for 30 days. Max Daily Amount: 300 mg. 90 Capsule    cilostazoL (PLETAL) 50 mg tablet Take 100 mg by mouth daily.  NIFEdipine ER (ADALAT CC) 30 mg ER tablet Take 30 mg by mouth nightly.  drospirenone, contraceptive, (Slynd) 4 mg (28) tab Take 1 Tablet by mouth daily.             Case discussed with: ER and hospitalist      Andrew Singer, DO

## 2022-03-18 NOTE — ED TRIAGE NOTES
Pt to ED sent by infectious disease MD for further work up, pt febrile and tachy during office visit PTA.  Pt has PICC line to R bicep for continuous antiviral infusions

## 2022-03-18 NOTE — PROGRESS NOTES
[facility] Dosing Services: Antimicrobial Stewardship Progress Note    Consult for antibiotic dosing of vancomycin by Dr. Bal Spearing  Pharmacist reviewed antibiotic appropriateness for 40y.o. year old , female  for indication of sepsis  Day of Therapy 1    Allergies  Patient has no known allergies. Plan:  Vancomycin therapy:  Loading dose of Vancomycin 1500 mg IV given  Maintenance dose: Vancomycin 750 mg IV every 12 hours   Dose calculated to approximate an AUC of 449  Target AUC/JAMEY of 400-600  Plan:  Pharmacy to follow daily and will make changes to dose and/or frequency based on clinical status. PK levels  No results found for: VANCT       Date Dose & Interval Measured (mcg/mL) Extrapolated (mcg/mL)   ? ? ? ?   ? ? ? ?   ? ? ? ?      Non-Kinetic Antimicrobial Dosing:   Current Regimen:   Current Antimicrobial Therapy (168h ago, onward)       Ordered     Start Stop    03/18/22 1741  cefTRIAXone (ROCEPHIN) 2 g in 0.9% sodium chloride 20 mL IV syringe  2 g,   IntraVENous,   EVERY 24 HOURS        References:    Lexicomp    03/19/22 1700 --    03/18/22 1800  vancomycin (VANCOCIN) 750 mg in 0.9% sodium chloride 250 mL (VIAL-MATE)  750 mg,   IntraVENous,   EVERY 12 HOURS        References:    Lexicomp    03/19/22 0600 --    03/18/22 1741  doxycycline (VIBRA-TABS) tablet 100 mg  100 mg,   Oral,   EVERY 12 HOURS        References:    Lexicomp    03/18/22 2100 03/25/22 2059    03/18/22 1756  vancomycin (VANCOCIN) 1500 mg in  ml infusion  1,500 mg,   IntraVENous,   ONCE        References:    Lexicomp    03/18/22 1757 03/19/22 0556    03/18/22 1535  acyclovir (ZOVIRAX) 650 mg in 0.9% sodium chloride 100 mL IVPB  10 mg/kg,   IntraVENous,   EVERY 8 HOURS        References:    Lexicomp    03/18/22 1536 --                    Recommendation:   Cultures     All Micro Results       Procedure Component Value Units Date/Time    CULTURE, RESPIRATORY/SPUTUM/BRONCH Deronda Dubonnet STAIN [[id number]]     Order Status: Sent Specimen: Sputum     RESPIRATORY VIRUS PANEL W/COVID-19, PCR [[id number]]     Order Status: Sent Specimen: NASOPHARYNGEAL SWAB     LEGIONELLA PNEUMOPHILA AG, URINE [[id number]]     Order Status: Sent Specimen: Urine     S. Anyi Aguayo, UR/CSF [[id number]]     Order Status: Sent     CULTURE, URINE [[id number]]     Order Status: Sent Specimen: Urine from Clean catch     CULTURE, MRSA [[id number]]     Order Status: Sent Specimen: Nasal from Nares     CULTURE, BLOOD [[id number]] Collected: 03/18/22 1604    Order Status: Completed Specimen: Blood Updated: 03/18/22 1650    CULTURE, BLOOD [[id number]] Collected: 03/18/22 1555    Order Status: Completed Specimen: Blood Updated: 03/18/22 1650    CULTURE, CSF Jhoan Kelp STAIN [[id number]]     Order Status: Sent Specimen: Cerebrospinal Fluid     MENINGITIS PATHOGENS PANEL, CSF (BY PCR) [[id number]]     Order Status: Sent Specimen: Cerebrospinal Fluid     CULTURE, URINE [[id number]]     Order Status: Sent Specimen: Urine from Clean catch            Serum Creatinine     Lab Results   Component Value Date/Time    Creatinine 0.95 03/18/2022 03:55 PM       Creatinine Clearance Estimated Creatinine Clearance: 70 mL/min (based on SCr of 0.95 mg/dL).      Temp   99.4 °F (37.4 °C)    WBC   Lab Results   Component Value Date/Time    WBC 16.5 (H) 03/18/2022 03:55 PM       H/H   Lab Results   Component Value Date/Time    HGB 11.4 (L) 03/18/2022 03:55 PM        Platelets   Lab Results   Component Value Date/Time    PLATELET 687 39/48/4479 03:55 PM          Pharmacist: Mila Hackett PHARMD Contact information: [phone]

## 2022-03-19 ENCOUNTER — APPOINTMENT (OUTPATIENT)
Dept: NON INVASIVE DIAGNOSTICS | Age: 37
DRG: 864 | End: 2022-03-19
Attending: INTERNAL MEDICINE
Payer: COMMERCIAL

## 2022-03-19 LAB
ALBUMIN SERPL-MCNC: 2.6 G/DL (ref 3.5–5)
ALBUMIN/GLOB SERPL: 0.6 {RATIO} (ref 1.1–2.2)
ALP SERPL-CCNC: 46 U/L (ref 45–117)
ALT SERPL-CCNC: 34 U/L (ref 12–78)
ANION GAP SERPL CALC-SCNC: 5 MMOL/L (ref 5–15)
AST SERPL-CCNC: 33 U/L (ref 15–37)
ATRIAL RATE: 142 BPM
BACTERIA SPEC CULT: NORMAL
BASOPHILS # BLD: 0 K/UL (ref 0–0.1)
BASOPHILS NFR BLD: 0 % (ref 0–1)
BILIRUB SERPL-MCNC: 0.4 MG/DL (ref 0.2–1)
BUN SERPL-MCNC: 5 MG/DL (ref 6–20)
BUN/CREAT SERPL: 6 (ref 12–20)
CALCIUM SERPL-MCNC: 8.4 MG/DL (ref 8.5–10.1)
CALCULATED P AXIS, ECG09: 48 DEGREES
CALCULATED R AXIS, ECG10: 20 DEGREES
CALCULATED T AXIS, ECG11: 5 DEGREES
CHLORIDE SERPL-SCNC: 109 MMOL/L (ref 97–108)
CO2 SERPL-SCNC: 25 MMOL/L (ref 21–32)
CREAT SERPL-MCNC: 0.84 MG/DL (ref 0.55–1.02)
CRP SERPL-MCNC: 7.1 MG/DL (ref 0–0.6)
CRYPTOCOCCUS NEOFORMANS/GATTII, CRNEOG: NOT DETECTED
CYTOMEGALOVIRUS, CYMEG: NOT DETECTED
DIAGNOSIS, 93000: NORMAL
DIFFERENTIAL METHOD BLD: ABNORMAL
ENTEROVIRUS, ENTVIR: NOT DETECTED
EOSINOPHIL # BLD: 0.3 K/UL (ref 0–0.4)
EOSINOPHIL NFR BLD: 3 % (ref 0–7)
ERYTHROCYTE [DISTWIDTH] IN BLOOD BY AUTOMATED COUNT: 16.4 % (ref 11.5–14.5)
ERYTHROCYTE [SEDIMENTATION RATE] IN BLOOD: 93 MM/HR (ref 0–20)
ESCHERICHIA COLI K1, ECK1: NOT DETECTED
GLOBULIN SER CALC-MCNC: 4.1 G/DL (ref 2–4)
GLUCOSE SERPL-MCNC: 110 MG/DL (ref 65–100)
HAEMOPHILUS INFLUENZAE, HAEFLU: NOT DETECTED
HCT VFR BLD AUTO: 29.2 % (ref 35–47)
HERPES SIMPLEX VIRUS 2, HSIMV2: NOT DETECTED
HGB BLD-MCNC: 9.2 G/DL (ref 11.5–16)
HSV1 DNA CSF QL NAA+PROBE: NOT DETECTED
HUMAN HERPESVIRUS 6, HUHV6: NOT DETECTED
HUMAN PARECHOVIRUS, HUPARV: NOT DETECTED
IMM GRANULOCYTES # BLD AUTO: 0.1 K/UL (ref 0–0.04)
IMM GRANULOCYTES NFR BLD AUTO: 1 % (ref 0–0.5)
LISTERIA MONOCYTOGENES, LISMON: NOT DETECTED
LYMPHOCYTES # BLD: 1 K/UL (ref 0.8–3.5)
LYMPHOCYTES NFR BLD: 10 % (ref 12–49)
MCH RBC QN AUTO: 27.9 PG (ref 26–34)
MCHC RBC AUTO-ENTMCNC: 31.5 G/DL (ref 30–36.5)
MCV RBC AUTO: 88.5 FL (ref 80–99)
MONOCYTES # BLD: 0.6 K/UL (ref 0–1)
MONOCYTES NFR BLD: 6 % (ref 5–13)
NEISSERIA MENINGITIDIS, NEIMEN: NOT DETECTED
NEUTS SEG # BLD: 7.9 K/UL (ref 1.8–8)
NEUTS SEG NFR BLD: 80 % (ref 32–75)
NRBC # BLD: 0 K/UL (ref 0–0.01)
NRBC BLD-RTO: 0 PER 100 WBC
P-R INTERVAL, ECG05: 130 MS
PLATELET # BLD AUTO: 229 K/UL (ref 150–400)
PMV BLD AUTO: 8.8 FL (ref 8.9–12.9)
POTASSIUM SERPL-SCNC: 3.8 MMOL/L (ref 3.5–5.1)
PROT SERPL-MCNC: 6.7 G/DL (ref 6.4–8.2)
Q-T INTERVAL, ECG07: 284 MS
QRS DURATION, ECG06: 66 MS
QTC CALCULATION (BEZET), ECG08: 436 MS
RBC # BLD AUTO: 3.3 M/UL (ref 3.8–5.2)
SERVICE CMNT-IMP: NORMAL
SODIUM SERPL-SCNC: 139 MMOL/L (ref 136–145)
STREPTOCOCCUS AGALACTIAE, SAGA: NOT DETECTED
STREPTOCOCCUS PNEUMONIAE, STRPNE: NOT DETECTED
VARICELLA ZOSTER VIRUS, VAZOVI: NOT DETECTED
VENTRICULAR RATE, ECG03: 142 BPM
WBC # BLD AUTO: 9.8 K/UL (ref 3.6–11)

## 2022-03-19 PROCEDURE — 74011000250 HC RX REV CODE- 250: Performed by: INTERNAL MEDICINE

## 2022-03-19 PROCEDURE — 74011000258 HC RX REV CODE- 258: Performed by: EMERGENCY MEDICINE

## 2022-03-19 PROCEDURE — 74011250636 HC RX REV CODE- 250/636: Performed by: INTERNAL MEDICINE

## 2022-03-19 PROCEDURE — 85025 COMPLETE CBC W/AUTO DIFF WBC: CPT

## 2022-03-19 PROCEDURE — 74011250637 HC RX REV CODE- 250/637: Performed by: INTERNAL MEDICINE

## 2022-03-19 PROCEDURE — 93306 TTE W/DOPPLER COMPLETE: CPT

## 2022-03-19 PROCEDURE — 86225 DNA ANTIBODY NATIVE: CPT

## 2022-03-19 PROCEDURE — 36415 COLL VENOUS BLD VENIPUNCTURE: CPT

## 2022-03-19 PROCEDURE — 85652 RBC SED RATE AUTOMATED: CPT

## 2022-03-19 PROCEDURE — 86038 ANTINUCLEAR ANTIBODIES: CPT

## 2022-03-19 PROCEDURE — 86140 C-REACTIVE PROTEIN: CPT

## 2022-03-19 PROCEDURE — 65660000000 HC RM CCU STEPDOWN

## 2022-03-19 PROCEDURE — 83520 IMMUNOASSAY QUANT NOS NONAB: CPT

## 2022-03-19 PROCEDURE — 80053 COMPREHEN METABOLIC PANEL: CPT

## 2022-03-19 PROCEDURE — 74011250636 HC RX REV CODE- 250/636: Performed by: EMERGENCY MEDICINE

## 2022-03-19 RX ADMIN — HYDROMORPHONE HYDROCHLORIDE 1 MG: 1 INJECTION, SOLUTION INTRAMUSCULAR; INTRAVENOUS; SUBCUTANEOUS at 06:36

## 2022-03-19 RX ADMIN — OXYCODONE 10 MG: 5 TABLET ORAL at 23:05

## 2022-03-19 RX ADMIN — ACETAMINOPHEN 650 MG: 325 TABLET ORAL at 23:05

## 2022-03-19 RX ADMIN — DOXYCYCLINE HYCLATE 100 MG: 100 TABLET, COATED ORAL at 08:00

## 2022-03-19 RX ADMIN — POLYETHYLENE GLYCOL 3350 17 G: 17 POWDER, FOR SOLUTION ORAL at 08:01

## 2022-03-19 RX ADMIN — SODIUM CHLORIDE, PRESERVATIVE FREE 10 ML: 5 INJECTION INTRAVENOUS at 00:17

## 2022-03-19 RX ADMIN — OXYCODONE 10 MG: 5 TABLET ORAL at 03:42

## 2022-03-19 RX ADMIN — SODIUM CHLORIDE, PRESERVATIVE FREE 10 ML: 5 INJECTION INTRAVENOUS at 06:41

## 2022-03-19 RX ADMIN — CILOSTAZOL 100 MG: 100 TABLET ORAL at 08:00

## 2022-03-19 RX ADMIN — SODIUM CHLORIDE, POTASSIUM CHLORIDE, SODIUM LACTATE AND CALCIUM CHLORIDE 1000 ML: 600; 310; 30; 20 INJECTION, SOLUTION INTRAVENOUS at 08:00

## 2022-03-19 RX ADMIN — DOXYCYCLINE HYCLATE 100 MG: 100 TABLET, COATED ORAL at 21:02

## 2022-03-19 RX ADMIN — SODIUM CHLORIDE, PRESERVATIVE FREE 10 ML: 5 INJECTION INTRAVENOUS at 21:01

## 2022-03-19 RX ADMIN — ASPIRIN 81 MG: 81 TABLET, CHEWABLE ORAL at 08:00

## 2022-03-19 RX ADMIN — HYDROMORPHONE HYDROCHLORIDE 1 MG: 1 INJECTION, SOLUTION INTRAMUSCULAR; INTRAVENOUS; SUBCUTANEOUS at 00:13

## 2022-03-19 RX ADMIN — VANCOMYCIN HYDROCHLORIDE 750 MG: 750 INJECTION, POWDER, LYOPHILIZED, FOR SOLUTION INTRAVENOUS at 18:28

## 2022-03-19 RX ADMIN — VANCOMYCIN HYDROCHLORIDE 750 MG: 750 INJECTION, POWDER, LYOPHILIZED, FOR SOLUTION INTRAVENOUS at 06:47

## 2022-03-19 RX ADMIN — SODIUM CHLORIDE 150 ML/HR: 9 INJECTION, SOLUTION INTRAVENOUS at 03:43

## 2022-03-19 RX ADMIN — ACETAMINOPHEN 650 MG: 325 TABLET ORAL at 13:29

## 2022-03-19 RX ADMIN — HYDROMORPHONE HYDROCHLORIDE 1 MG: 1 INJECTION, SOLUTION INTRAMUSCULAR; INTRAVENOUS; SUBCUTANEOUS at 21:01

## 2022-03-19 RX ADMIN — ACYCLOVIR SODIUM 650 MG: 50 INJECTION, SOLUTION INTRAVENOUS at 06:41

## 2022-03-19 RX ADMIN — SODIUM CHLORIDE, PRESERVATIVE FREE 10 ML: 5 INJECTION INTRAVENOUS at 15:23

## 2022-03-19 RX ADMIN — ACYCLOVIR SODIUM 650 MG: 50 INJECTION, SOLUTION INTRAVENOUS at 15:19

## 2022-03-19 RX ADMIN — OXYCODONE 10 MG: 5 TABLET ORAL at 10:26

## 2022-03-19 RX ADMIN — ACYCLOVIR SODIUM 650 MG: 50 INJECTION, SOLUTION INTRAVENOUS at 21:08

## 2022-03-19 RX ADMIN — CEFTRIAXONE 2 G: 2 INJECTION, POWDER, FOR SOLUTION INTRAMUSCULAR; INTRAVENOUS at 17:27

## 2022-03-19 RX ADMIN — OXYCODONE 10 MG: 5 TABLET ORAL at 16:22

## 2022-03-19 NOTE — PROGRESS NOTES
Neurology was consulted to perform LP but it appears IR did this yesterday and Hospitalist has reviewed those results. Please contact Neurology if any new neurologic issues.

## 2022-03-19 NOTE — H&P
Flako Merida roxanna [place] [address]  [address] [facility], [address] [place] [address]  [phone]    Admission History and Physical      NAME:  Leelee Gong   :   1985   MRN:  [id number]     PCP:  Gema Caballero DO     Date/Time of service:  3/18/2022  8:13 PM        Subjective:     CHIEF COMPLAINT: Fevers    HISTORY OF PRESENT ILLNESS:     Ms. Anahi Becerra is a 40 y.o.  female with a past medical history of rheumatoid arthritis previously on upadacitinib, no nodes, tachycardia and recent acute varicella-zoster meningoencephalitis who is admitted with sepsis. Ms. Anahi Becerra was discharged on 2020 from [address] [place] [address] after being admitted for  acute varicella-zoster meningoencephalitis. During her hospital stay, she was treated with steroids and started on acyclovir. She was discharged with a PICC line and has completed 16 days of therapy. Today she followed up with infectious disease outpatient who sent her to the emergency room due to concern for fevers. Patient states she continues to have numbness, burning and tenderness of her neck and right upper extremity. She states the pain pressure in her neck right shoulder slightly improved. She also endorses some shortness of breath. She has some pain of her joints. She states that she has been experiencing some fevers at home. No Known Allergies    Prior to Admission medications    Medication Sig Start Date End Date Taking? Authorizing Provider   oxyCODONE IR (Roxicodone) 5 mg immediate release tablet Take 1-2 Tablets by mouth every six (6) hours as needed for Pain (Take 1 tablet for moderate pain and 2 tabs for severe pain) for up to 5 days. Max Daily Amount: 40 mg. 3/14/22 3/19/22  Marco A Vaughan MD   acyclovir 667 mg 667 mg by IntraVENous route every eight (8) hours for 22 days. 3/9/22 3/31/22  Gail Malcolm MD   aspirin 81 mg chewable tablet Take 1 Tablet by mouth daily for 30 days.   Patient not taking: Reported on 3/18/2022 3/10/22 4/9/22  Ann Dorantes MD   lidocaine (XYLOCAINE) 4 % topical cream Apply  to affected area two (2) times daily as needed for Pain for up to 30 days. 3/9/22 4/8/22  Ann Dorantes MD   polyethylene glycol (MIRALAX) 17 gram packet Take 1 Packet by mouth daily for 30 days. 3/10/22 4/9/22  Ann Dorantes MD   gabapentin (NEURONTIN) 100 mg capsule Take 1 Capsule by mouth three (3) times daily for 30 days. Max Daily Amount: 300 mg. 3/9/22 4/8/22  Ann Dorantes MD   cilostazoL (PLETAL) 50 mg tablet Take 100 mg by mouth daily. Provider, Historical   NIFEdipine ER (ADALAT CC) 30 mg ER tablet Take 30 mg by mouth nightly. Provider, Historical   drospirenone, contraceptive, (Slynd) 4 mg (28) tab Take 1 Tablet by mouth daily. Provider, Historical       Past Medical History:   Diagnosis Date    History of vascular access device 03/08/2022    [facility] VAT: 4 FR Single lumen PICC Right brachial for LT ABX therapy; Length 35 CM Arm circ 31 CM. Verified with Max P @ 35 CM    Raynaud's disease     Rheumatoid arthritis ([facility] [address]         Past Surgical History:   Procedure Laterality Date    HX GYN      hystorectomy       Social History     Tobacco Use    Smoking status: Never Smoker    Smokeless tobacco: Not on file   Substance Use Topics    Alcohol use: Not Currently        No family history on file.      Review of Systems:  Per HPI         Objective:      VITALS:    Vital signs reviewed; most recent are:    Visit Vitals  /71   Pulse 89   Temp 99.4 °F (37.4 °C)   Resp 16   Wt 63.5 kg (140 lb)   SpO2 100%   BMI 24.03 kg/m²     SpO2 Readings from Last 6 Encounters:   03/18/22 100%   03/14/22 98%   03/09/22 98%        No intake or output data in the 24 hours ending 03/18/22 2013     Exam:     Physical Exam:    Gen:  Well-developed, well-nourished, in no acute distress  HEENT:  Pink conjunctivae, PERRL, hearing intact to voice, moist mucous membranes  Neck:  Supple, no tenderness to palpation   Resp: No accessory muscle use, clear breath sounds without wheezes rales or rhonchi  Card:  RRR, No murmurs, normal S1, S2, no peripheral edema  Abd:  Soft, non-tender, non-distended, normoactive bowel sounds are present, no palpable organomegaly   Lymph:  No cervical adenopathy  Musc:  No cyanosis or clubbing  Skin:  No rashes or ulcers, skin turgor is good  Neuro:  Cranial nerves 3-12 are grossly intact, strength 5/5 bilaterally UEs and LEs, follows commands appropriately  Psych:  Oriented to person, place, and time, Alert with good insight      Labs:    Recent Labs     03/18/22  1555   WBC 16.5*   HGB 11.4*   HCT 35.7        Recent Labs     03/18/22  1555   *   K 5.3*      CO2 24   GLU 91   BUN 8   CREA 0.95   CA 9.3   ALB 3.2*   TBILI 1.2*   ALT 41     Lab Results   Component Value Date/Time    Glucose (POC) 73 03/03/2022 05:40 PM    Glucose (POC) 63 (L) 03/03/2022 05:06 PM     No results for input(s): PH, PCO2, PO2, HCO3, FIO2 in the last 72 hours. No results for input(s): INR, INREXT in the last 72 hours. Radiology and EKG reviewed: Chest x-ray with patchy opacities in both lungs right greater than left. Old Records reviewed in [facility]       Assessment/Plan:       Sepsis: POA. Meets criteria with leukocytosis tachycardia with suspected source. Lactic acidosis within normal limits. Differential diagnosis for source includes pneumonia, PICC line, CNS. Plan to remove PICC line if blood cultures turn positive. Follow blood cultures. Obtain respiratory viral panel. Obtain pneumonia serologies. IV ceftriaxone and doxycycline. IVF. Consult infectious disease. Dyspnea/ Acute on chronic tachycardia: Patient states that she suffers from tachycardia at baseline however she is currently higher than her baseline. EKG with no concern for acute ischemia. Given elevated D-dimer, shortness of breath, tachycardia and recent immobility due to illness check CTA chest to rule out PE.  Check Echo.     Acute myelitis / Acute Varicella Zoster Meningoencephalitis: POA.  Infection due to immunocompromised state. S/p LP during prior hospitalization and repeat LP 3/18 in ER. Follow LP studies. Continue IV acyclovir. Reconsult neurology and infectious disease.     RUE Pain and paresthesias: POA, likely due to myelitis.  Completed high dose IV steroids on prior admission. Check right upper extremity Doppler.  Continue gabapentin. Consult neurology. Hyperkalemia/ hyponatremia: Suspect due to intravascular volume depletion. Will repeat K.  IVF. Monitor. Positive West Nile virus IgG: isolated from CSF during prior hospitalization. No Igm identified. Rheumatoid arthritis/ Chronic immunosupppression: Holding upadacitinib due to varicella zoster infection. Percocet Prn, lidocaine and IV Dilaudid as needed.     Raynaud's: Continue nifedipine, pletal      Risk of deterioration: high      Total time spent with patient: 48 Minutes **I personally saw and examined the patient during this time period**                 Care Plan discussed with: Patient, Nursing Staff and Consultant/Specialist    Discussed:  Care Plan    Prophylaxis:  SCD's (given LP holding chemical ppx at this time)     Probable Disposition:  Home w/Family           ___________________________________________________    Attending Physician: Pineda Milton DO

## 2022-03-19 NOTE — PROGRESS NOTES
0700: Bedside and Verbal shift change report given to Jolie Hogan (oncoming nurse) by Priscila White RN (offgoing nurse). Report included the following information SBAR, Kardex, Accordion and Cardiac Rhythm Sinus Tach. Notified MD of patient's elevated HR. MD gave verbal orders to notify him when patient's heart rate sustains above 130. Notified MD of patient's HR of 118 and BP of 97/63. No new orders received. This patient was assisted with Intentional Toileting every 2 hours during this shift as appropriate. Documentation of ambulation and output reflected on Flowsheet as appropriate. Purposeful hourly rounding was completed using AIDET and 5Ps. Outcomes of PHR documented as they occurred. Bed alarm in use as appropriate. Dual Suction and ambubag in place.

## 2022-03-19 NOTE — PROGRESS NOTES
Flako Merida [facility] [address]  [address] [facility], [address] [place] [address]  [phone]      Medical Progress Note      NAME: Sandy Jones   :  1985  MRM:  [id number]    Date/Time: 3/19/2022  11:27 AM           Assessment / Plan:     #Fever: POA. P/w leukocytosis tachycardia but no obvious suspected source. WBC and fever improved with abx. No e/o pneumonia, UTI, cellulitis. LP reassuring and receiving tx for VZV. Awaiting blood culture results, but c/f bacteremia. Consider RA related, but she has no active synovitis to suggest flare. She endorses unilateral head/neck pain, burning pressure so GCA a consideration, but less likely   - Cont Vanc, cefepime, doxy, acyclovir   - F/u blood cultures   - Check ESR, CRP, JOJO, ANCA     #Chronic tachycardia: Patient states that she suffers from tachycardia at baseline. EKG with no concern for acute ischemia. CTA neg for PE. Does have pain, potentially untreated. Euvolemic.    - Optimize volume, pain   - May consider CME at dc     #Acute myelitis / Acute Varicella Zoster Meningoencephalitis: POA.  Infection due to immunocompromised state. S/p LP during prior hospitalization and repeat LP 3/18 in ER reassuring. Aquaporin Ab neg. - Acyclovir   - Hold on repeat imaging for now     #RUE Pain and paresthesias: POA, likely due to myelitis.  Completed high dose IV steroids on prior admission. Dopplers neg     #Hyperkalemia/ hyponatremia: Suspect due to intravascular volume depletion.      #Rheumatoid arthritis/ Chronic immunosupppression: Last dose of upadacitinib early March due to varicella zoster infection.  No active synovitis on exam     Raynaud's: Continue nifedipine, pletal; hold ccb with hypotension               Discussed:  Care Plan    Prophylaxis:  Lovenox    Disposition:  Home w/Family           ___________________________________________________    Attending Physician: Zeynep Montalvo MD        Subjective:     Chief Complaint:  Anne Gutierrez, afebrile. Feels a little better. Endorses R sided pressure like pain on R head and neck, which has been there since last admit. Intermittent sharp burning pain. Strength intact bilat upper extremities and LEs    ROS:  (bold if positive, if negative)    Tolerating PT  Tolerating Diet          Objective:       Vitals:          Last 24hrs VS reviewed since prior progress note.  Most recent are:    Visit Vitals  /60 (BP 1 Location: Left upper arm, BP Patient Position: At rest)   Pulse (!) 126   Temp 98.7 °F (37.1 °C)   Resp 20   Wt 63.5 kg (140 lb)   SpO2 95%   BMI 24.03 kg/m²     SpO2 Readings from Last 6 Encounters:   03/19/22 95%   03/14/22 98%   03/09/22 98%            Intake/Output Summary (Last 24 hours) at 3/19/2022 1127  Last data filed at 3/19/2022 4025  Gross per 24 hour   Intake 1709.17 ml   Output --   Net 1709.17 ml          Exam:     Physical Exam:    Gen:  Well-developed, well-nourished, in no acute distress  HEENT:  Pink conjunctivae, PERRL, hearing intact to voice, moist mucous membranes  Neck:  Supple, without masses, thyroid non-tender  Resp:  No accessory muscle use, clear breath sounds without wheezes rales or rhonchi  Card:  No murmurs, normal S1, S2 without thrills, bruits or peripheral edema  Abd:  Soft, non-tender, non-distended, normoactive bowel sounds are present  Musc:  No cyanosis or clubbing  Skin:  No rashes or ulcers, skin turgor is good  Neuro:  Cranial nerves 3-12 are grossly intact,  strength is 5/5 bilaterally and dorsi / plantarflexion is 5/5 bilaterally, follows commands appropriately  Psych:  Good insight, oriented to person, place and time, alert    Medications Reviewed: (see below)    Lab Data Reviewed: (see below)    ______________________________________________________________________    Medications:     Current Facility-Administered Medications   Medication Dose Route Frequency    sodium chloride (NS) flush 5-10 mL  5-10 mL IntraVENous PRN    acyclovir (ZOVIRAX) 650 mg in 0.9% sodium chloride 100 mL IVPB  10 mg/kg IntraVENous Q8H    sodium chloride (NS) flush 5-40 mL  5-40 mL IntraVENous Q8H    sodium chloride (NS) flush 5-40 mL  5-40 mL IntraVENous PRN    acetaminophen (TYLENOL) tablet 650 mg  650 mg Oral Q6H PRN    Or    acetaminophen (TYLENOL) suppository 650 mg  650 mg Rectal Q6H PRN    polyethylene glycol (MIRALAX) packet 17 g  17 g Oral DAILY PRN    ondansetron (ZOFRAN ODT) tablet 4 mg  4 mg Oral Q8H PRN    Or    ondansetron (ZOFRAN) injection 4 mg  4 mg IntraVENous Q6H PRN    cefTRIAXone (ROCEPHIN) 2 g in 0.9% sodium chloride 20 mL IV syringe  2 g IntraVENous Q24H    doxycycline (VIBRA-TABS) tablet 100 mg  100 mg Oral Q12H    vancomycin (VANCOCIN) 750 mg in 0.9% sodium chloride 250 mL (VIAL-MATE)  750 mg IntraVENous Q12H    oxyCODONE IR (ROXICODONE) tablet 10 mg  10 mg Oral Q6H PRN    HYDROmorphone (DILAUDID) injection 1 mg  1 mg IntraVENous Q6H PRN    aspirin chewable tablet 81 mg  81 mg Oral DAILY    cilostazoL (PLETAL) tablet 100 mg  100 mg Oral DAILY    lidocaine (XYLOCAINE) 4 % cream   Topical BID PRN    [Held by provider] NIFEdipine ER (PROCARDIA XL) tablet 30 mg  30 mg Oral DAILY    polyethylene glycol (MIRALAX) packet 17 g  17 g Oral DAILY            Lab Review:     Recent Labs     03/19/22  0351 03/18/22  1555   WBC 9.8 16.5*   HGB 9.2* 11.4*   HCT 29.2* 35.7    256     Recent Labs     03/19/22  0351 03/18/22  1555    133*   K 3.8 5.3*   * 103   CO2 25 24   * 91   BUN 5* 8   CREA 0.84 0.95   CA 8.4* 9.3   ALB 2.6* 3.2*   ALT 34 41     No components found for: Wilfredo Point

## 2022-03-19 NOTE — PROGRESS NOTES
TRANSFER - IN REPORT:    Verbal report received from Jonathan Cali RN(name) on Amanda Colon  being received from ER(unit) for routine progression of care      Report consisted of patients Situation, Background, Assessment and   Recommendations(SBAR). Information from the following report(s) SBAR, Kardex, [facility] and Recent Results was reviewed with the receiving nurse. Opportunity for questions and clarification was provided. Assessment completed upon patients arrival to unit and care assumed. 2315 Received pt from ER via stretcher by nurse with monitor. Patient is A&O x4, instructed on how to use call bell and fall precautions. Bedside table and call bell are within reach. 0015 Patient has c/o right side/posterior pain of head, rated 8 out of 10 on pain scale. Medicated with prn dose of Dilaudid 1 mg IV.     0350 Patient has c/o pain, medicated with prn dose of oxycodone 10 mg by mouth. Labs collected. Patient stated she has itching of her legs and her left buttocks. Assessed skin, left buttocks has a cluster of closed bumps, and her legs did not have any redness, rash or bumps noted.

## 2022-03-20 ENCOUNTER — APPOINTMENT (OUTPATIENT)
Dept: MRI IMAGING | Age: 37
DRG: 864 | End: 2022-03-20
Attending: INTERNAL MEDICINE
Payer: COMMERCIAL

## 2022-03-20 PROBLEM — R20.0 RIGHT FACIAL NUMBNESS: Status: ACTIVE | Noted: 2022-03-03

## 2022-03-20 LAB
ATRIAL RATE: 131 BPM
BASOPHILS # BLD: 0 K/UL (ref 0–0.1)
BASOPHILS NFR BLD: 0 % (ref 0–1)
CALCULATED P AXIS, ECG09: 48 DEGREES
CALCULATED R AXIS, ECG10: 11 DEGREES
CALCULATED T AXIS, ECG11: 28 DEGREES
CREAT SERPL-MCNC: 0.73 MG/DL (ref 0.55–1.02)
DATE LAST DOSE: NORMAL
DIAGNOSIS, 93000: NORMAL
DIFFERENTIAL METHOD BLD: ABNORMAL
ECHO AO ASC DIAM: 2.9 CM
ECHO AO ASCENDING AORTA INDEX: 1.73 CM/M2
ECHO AV AREA PEAK VELOCITY: 2.4 CM2
ECHO AV AREA PEAK VELOCITY: 2.5 CM2
ECHO AV AREA VTI: 2.9 CM2
ECHO AV AREA/BSA VTI: 1.7 CM2/M2
ECHO AV MEAN GRADIENT: 6 MMHG
ECHO AV MEAN VELOCITY: 1.2 M/S
ECHO AV PEAK GRADIENT: 11 MMHG
ECHO AV PEAK GRADIENT: 12 MMHG
ECHO AV PEAK VELOCITY: 1.6 M/S
ECHO AV PEAK VELOCITY: 1.7 M/S
ECHO AV VTI: 26 CM
ECHO LA DIAMETER INDEX: 1.79 CM/M2
ECHO LA DIAMETER: 3 CM
ECHO LA VOL 2C: 25 ML (ref 22–52)
ECHO LA VOL 4C: 15 ML (ref 22–52)
ECHO LA VOL BP: 20 ML (ref 22–52)
ECHO LA VOL/BSA BIPLANE: 12 ML/M2 (ref 16–34)
ECHO LA VOLUME AREA LENGTH: 22 ML
ECHO LA VOLUME INDEX A2C: 15 ML/M2 (ref 16–34)
ECHO LA VOLUME INDEX A4C: 9 ML/M2 (ref 16–34)
ECHO LA VOLUME INDEX AREA LENGTH: 13 ML/M2 (ref 16–34)
ECHO LV E' LATERAL VELOCITY: 13 CM/S
ECHO LV E' SEPTAL VELOCITY: 13 CM/S
ECHO LV FRACTIONAL SHORTENING: 40 % (ref 28–44)
ECHO LV INTERNAL DIMENSION DIASTOLE INDEX: 2.56 CM/M2
ECHO LV INTERNAL DIMENSION DIASTOLIC: 4.3 CM (ref 3.9–5.3)
ECHO LV INTERNAL DIMENSION SYSTOLIC INDEX: 1.55 CM/M2
ECHO LV INTERNAL DIMENSION SYSTOLIC: 2.6 CM
ECHO LV IVSD: 0.8 CM (ref 0.6–0.9)
ECHO LV MASS 2D: 105.3 G (ref 67–162)
ECHO LV MASS INDEX 2D: 62.7 G/M2 (ref 43–95)
ECHO LV POSTERIOR WALL DIASTOLIC: 0.8 CM (ref 0.6–0.9)
ECHO LV RELATIVE WALL THICKNESS RATIO: 0.37
ECHO LVOT AREA: 2.8 CM2
ECHO LVOT AV VTI INDEX: 1.01
ECHO LVOT DIAM: 1.9 CM
ECHO LVOT MEAN GRADIENT: 4 MMHG
ECHO LVOT PEAK GRADIENT: 8 MMHG
ECHO LVOT PEAK VELOCITY: 1.5 M/S
ECHO LVOT STROKE VOLUME INDEX: 44.2 ML/M2
ECHO LVOT SV: 74.2 ML
ECHO LVOT VTI: 26.2 CM
ECHO MV A VELOCITY: 0.99 M/S
ECHO MV E DECELERATION TIME (DT): 135.3 MS
ECHO MV E VELOCITY: 0.95 M/S
ECHO MV E/A RATIO: 0.96
ECHO MV E/E' LATERAL: 7.31
ECHO MV E/E' RATIO (AVERAGED): 7.31
ECHO MV E/E' SEPTAL: 7.31
ECHO MV REGURGITANT PEAK GRADIENT: 71 MMHG
ECHO MV REGURGITANT PEAK VELOCITY: 4.2 M/S
ECHO PV MAX VELOCITY: 1.2 M/S
ECHO PV PEAK GRADIENT: 5 MMHG
ECHO RV FREE WALL PEAK S': 18 CM/S
ECHO RV INTERNAL DIMENSION: 3.2 CM
ECHO RV TAPSE: 2.2 CM (ref 1.5–2)
ECHO TV REGURGITANT MAX VELOCITY: 2.91 M/S
ECHO TV REGURGITANT PEAK GRADIENT: 34 MMHG
EOSINOPHIL # BLD: 0.3 K/UL (ref 0–0.4)
EOSINOPHIL NFR BLD: 4 % (ref 0–7)
ERYTHROCYTE [DISTWIDTH] IN BLOOD BY AUTOMATED COUNT: 15.9 % (ref 11.5–14.5)
FLUID CULTURE, SPNG2: NORMAL
HCT VFR BLD AUTO: 27.5 % (ref 35–47)
HGB BLD-MCNC: 8.6 G/DL (ref 11.5–16)
IMM GRANULOCYTES # BLD AUTO: 0 K/UL (ref 0–0.04)
IMM GRANULOCYTES NFR BLD AUTO: 0 % (ref 0–0.5)
L PNEUMO1 AG UR QL IA: NEGATIVE
LYMPHOCYTES # BLD: 1 K/UL (ref 0.8–3.5)
LYMPHOCYTES NFR BLD: 14 % (ref 12–49)
MCH RBC QN AUTO: 27.4 PG (ref 26–34)
MCHC RBC AUTO-ENTMCNC: 31.3 G/DL (ref 30–36.5)
MCV RBC AUTO: 87.6 FL (ref 80–99)
MONOCYTES # BLD: 0.5 K/UL (ref 0–1)
MONOCYTES NFR BLD: 7 % (ref 5–13)
NEUTS SEG # BLD: 5.2 K/UL (ref 1.8–8)
NEUTS SEG NFR BLD: 75 % (ref 32–75)
NRBC # BLD: 0 K/UL (ref 0–0.01)
NRBC BLD-RTO: 0 PER 100 WBC
ORGANISM ID, SPNG3: NORMAL
P-R INTERVAL, ECG05: 142 MS
PLATELET # BLD AUTO: 218 K/UL (ref 150–400)
PLEASE NOTE, SPNG4: NORMAL
PMV BLD AUTO: 8.8 FL (ref 8.9–12.9)
Q-T INTERVAL, ECG07: 300 MS
QRS DURATION, ECG06: 68 MS
QTC CALCULATION (BEZET), ECG08: 443 MS
RBC # BLD AUTO: 3.14 M/UL (ref 3.8–5.2)
REPORTED DOSE,DOSE: NORMAL UNITS
REPORTED DOSE/TIME,TMG: 1800
S PNEUM AG SPEC QL LA: NEGATIVE
SPECIMEN SOURCE: NORMAL
SPECIMEN SOURCE: NORMAL
SPECIMEN, SPNG1: NORMAL
VANCOMYCIN TROUGH SERPL-MCNC: 9.1 UG/ML (ref 5–10)
VENTRICULAR RATE, ECG03: 131 BPM
WBC # BLD AUTO: 6.9 K/UL (ref 3.6–11)

## 2022-03-20 PROCEDURE — 70553 MRI BRAIN STEM W/O & W/DYE: CPT

## 2022-03-20 PROCEDURE — 74011250636 HC RX REV CODE- 250/636: Performed by: RADIOLOGY

## 2022-03-20 PROCEDURE — 85025 COMPLETE CBC W/AUTO DIFF WBC: CPT

## 2022-03-20 PROCEDURE — 74011250636 HC RX REV CODE- 250/636: Performed by: EMERGENCY MEDICINE

## 2022-03-20 PROCEDURE — 80202 ASSAY OF VANCOMYCIN: CPT

## 2022-03-20 PROCEDURE — 74011250637 HC RX REV CODE- 250/637: Performed by: INTERNAL MEDICINE

## 2022-03-20 PROCEDURE — 74011250636 HC RX REV CODE- 250/636: Performed by: INTERNAL MEDICINE

## 2022-03-20 PROCEDURE — A9575 INJ GADOTERATE MEGLUMI 0.1ML: HCPCS | Performed by: RADIOLOGY

## 2022-03-20 PROCEDURE — 74011250637 HC RX REV CODE- 250/637: Performed by: PSYCHIATRY & NEUROLOGY

## 2022-03-20 PROCEDURE — 36415 COLL VENOUS BLD VENIPUNCTURE: CPT

## 2022-03-20 PROCEDURE — 72156 MRI NECK SPINE W/O & W/DYE: CPT

## 2022-03-20 PROCEDURE — 74011000258 HC RX REV CODE- 258: Performed by: EMERGENCY MEDICINE

## 2022-03-20 PROCEDURE — 99223 1ST HOSP IP/OBS HIGH 75: CPT | Performed by: PSYCHIATRY & NEUROLOGY

## 2022-03-20 PROCEDURE — 74011000250 HC RX REV CODE- 250: Performed by: INTERNAL MEDICINE

## 2022-03-20 PROCEDURE — 65660000000 HC RM CCU STEPDOWN

## 2022-03-20 PROCEDURE — 82565 ASSAY OF CREATININE: CPT

## 2022-03-20 PROCEDURE — 93306 TTE W/DOPPLER COMPLETE: CPT | Performed by: INTERNAL MEDICINE

## 2022-03-20 RX ORDER — DEXAMETHASONE SODIUM PHOSPHATE 4 MG/ML
4 INJECTION, SOLUTION INTRA-ARTICULAR; INTRALESIONAL; INTRAMUSCULAR; INTRAVENOUS; SOFT TISSUE EVERY 6 HOURS
Status: DISCONTINUED | OUTPATIENT
Start: 2022-03-20 | End: 2022-03-21

## 2022-03-20 RX ORDER — GADOTERATE MEGLUMINE 376.9 MG/ML
13 INJECTION INTRAVENOUS
Status: COMPLETED | OUTPATIENT
Start: 2022-03-20 | End: 2022-03-20

## 2022-03-20 RX ORDER — GABAPENTIN 300 MG/1
300 CAPSULE ORAL 3 TIMES DAILY
Status: DISCONTINUED | OUTPATIENT
Start: 2022-03-20 | End: 2022-03-24

## 2022-03-20 RX ADMIN — VANCOMYCIN HYDROCHLORIDE 1250 MG: 1.25 INJECTION, POWDER, LYOPHILIZED, FOR SOLUTION INTRAVENOUS at 06:43

## 2022-03-20 RX ADMIN — HYDROMORPHONE HYDROCHLORIDE 1 MG: 1 INJECTION, SOLUTION INTRAMUSCULAR; INTRAVENOUS; SUBCUTANEOUS at 15:16

## 2022-03-20 RX ADMIN — DOXYCYCLINE HYCLATE 100 MG: 100 TABLET, COATED ORAL at 08:51

## 2022-03-20 RX ADMIN — DEXAMETHASONE SODIUM PHOSPHATE 4 MG: 4 INJECTION, SOLUTION INTRAMUSCULAR; INTRAVENOUS at 23:11

## 2022-03-20 RX ADMIN — POLYETHYLENE GLYCOL 3350 17 G: 17 POWDER, FOR SOLUTION ORAL at 08:50

## 2022-03-20 RX ADMIN — SODIUM CHLORIDE, PRESERVATIVE FREE 10 ML: 5 INJECTION INTRAVENOUS at 14:37

## 2022-03-20 RX ADMIN — DOXYCYCLINE HYCLATE 100 MG: 100 TABLET, COATED ORAL at 21:35

## 2022-03-20 RX ADMIN — OXYCODONE 10 MG: 5 TABLET ORAL at 20:05

## 2022-03-20 RX ADMIN — ASPIRIN 81 MG: 81 TABLET, CHEWABLE ORAL at 08:51

## 2022-03-20 RX ADMIN — ACYCLOVIR SODIUM 650 MG: 50 INJECTION, SOLUTION INTRAVENOUS at 04:40

## 2022-03-20 RX ADMIN — GABAPENTIN 300 MG: 300 CAPSULE ORAL at 14:34

## 2022-03-20 RX ADMIN — GABAPENTIN 300 MG: 300 CAPSULE ORAL at 21:35

## 2022-03-20 RX ADMIN — SODIUM CHLORIDE, PRESERVATIVE FREE 10 ML: 5 INJECTION INTRAVENOUS at 06:43

## 2022-03-20 RX ADMIN — GADOTERATE MEGLUMINE 13 ML: 376.9 INJECTION INTRAVENOUS at 13:16

## 2022-03-20 RX ADMIN — HYDROMORPHONE HYDROCHLORIDE 1 MG: 1 INJECTION, SOLUTION INTRAMUSCULAR; INTRAVENOUS; SUBCUTANEOUS at 02:47

## 2022-03-20 RX ADMIN — ACYCLOVIR SODIUM 650 MG: 50 INJECTION, SOLUTION INTRAVENOUS at 14:33

## 2022-03-20 RX ADMIN — CILOSTAZOL 100 MG: 100 TABLET ORAL at 08:51

## 2022-03-20 RX ADMIN — OXYCODONE 10 MG: 5 TABLET ORAL at 11:46

## 2022-03-20 RX ADMIN — CEFTRIAXONE 2 G: 2 INJECTION, POWDER, FOR SOLUTION INTRAMUSCULAR; INTRAVENOUS at 18:00

## 2022-03-20 RX ADMIN — ACYCLOVIR SODIUM 650 MG: 50 INJECTION, SOLUTION INTRAVENOUS at 21:45

## 2022-03-20 RX ADMIN — DEXAMETHASONE SODIUM PHOSPHATE 4 MG: 4 INJECTION, SOLUTION INTRAMUSCULAR; INTRAVENOUS at 18:00

## 2022-03-20 RX ADMIN — HYDROMORPHONE HYDROCHLORIDE 1 MG: 1 INJECTION, SOLUTION INTRAMUSCULAR; INTRAVENOUS; SUBCUTANEOUS at 08:51

## 2022-03-20 RX ADMIN — OXYCODONE 10 MG: 5 TABLET ORAL at 04:35

## 2022-03-20 RX ADMIN — VANCOMYCIN HYDROCHLORIDE 1250 MG: 1.25 INJECTION, POWDER, LYOPHILIZED, FOR SOLUTION INTRAVENOUS at 18:01

## 2022-03-20 RX ADMIN — SODIUM CHLORIDE, PRESERVATIVE FREE 10 ML: 5 INJECTION INTRAVENOUS at 21:36

## 2022-03-20 NOTE — PROGRESS NOTES
[facility] Vancomycin Pharmacy Consult 03/20/22  Vancomycin random level= 9.1 mcg/ml. It predicts an   This is NOT within goal    Plan:   Will change Vancomycin to 1250 mg IV q12hr and recheck level soon    Thank you  Elicia Olson, PharmD  [phone]

## 2022-03-20 NOTE — PROGRESS NOTES
Flako Merida [facility] [address]  [address] [facility], [place], [address] [place] [address]  [phone]      Medical Progress Note      NAME: Roldan Amin   :  1985  MRM:  [id number]    Date/Time: 3/20/2022         Assessment / Plan:     #Fever: POA. P/w leukocytosis tachycardia but no obvious suspected source. WBC and fever improved with abx. No e/o pneumonia, UTI, cellulitis. LP reassuring and receiving tx for VZV. No obvious skin lesions. BCx NGTD. Consider RA related, but she has no active synovitis to suggest flare. She endorses unilateral head/neck pain, burning pressure so GCA a consideration, but less likely. Does have a trigeminal distribution, so potentially related to central lesion as known. ESR fairly elevated. - Cont Vanc, cefepime, doxy, acyclovir for now   - F/u blood cultures   - Check  JOJO, ANCA   - Will consider steroids in collaboration with ID, Neuro   - Repeat brain, cervical spine MRI     #Sinus tachycardia: Echo normal. CTA neg for PE. Does have pain, potentially untreated. 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  Infection due to immunocompromised state. S/p LP during prior hospitalization and repeat LP 3/18 in ER reassuring. Aquaporin Ab neg. - Acyclovir   - Repeat imaging today   - Neuro to see +/- steroids    #RUE Pain and paresthesias: POA, likely due to myelitis.  Completed high dose IV steroids on prior admission. Dopplers neg   - +/- steroids today     #Hyperkalemia/ hyponatremia: Suspect due to intravascular volume depletion.      #Rheumatoid arthritis/ Chronic immunosupppression: Last dose of upadacitinib early March due to varicella zoster infection.  No active synovitis on exam     Raynaud's: Continue nifedipine, pletal; hold ccb with hypotension               Discussed:  Care Plan    Prophylaxis:  Lovenox    Disposition:  Home w/Family           ___________________________________________________    Attending Physician: Willem Erwin MD        Subjective:     Chief Complaint:  Otila Meadow, afebrile. Feels she is improving but with persistent R sided neck and facial pain, burning, pressure. ROS:  (bold if positive, if negative)    Tolerating PT  Tolerating Diet          Objective:       Vitals:          Last 24hrs VS reviewed since prior progress note.  Most recent are:    Visit Vitals  BP (!) 97/54 (BP 1 Location: Left upper arm, BP Patient Position: At rest)   Pulse (!) 109   Temp 98.8 °F (37.1 °C)   Resp 20   Ht 5' 4\" (1.626 m)   Wt 69.9 kg (154 lb 1.6 oz)   SpO2 96%   BMI 26.45 kg/m²     SpO2 Readings from Last 6 Encounters:   03/20/22 96%   03/14/22 98%   03/09/22 98%            Intake/Output Summary (Last 24 hours) at 3/20/2022 1101  Last data filed at 3/19/2022 1146  Gross per 24 hour   Intake 250 ml   Output --   Net 250 ml          Exam:     Physical Exam:    Gen:  Well-developed, well-nourished, in no acute distress  HEENT:  Pink conjunctivae, PERRL, hearing intact to voice, moist mucous membranes  Neck:  Supple, without masses, thyroid non-tender  Resp:  No accessory muscle use, clear breath sounds without wheezes rales or rhonchi  Card:  No murmurs, normal S1, S2 without thrills, bruits or peripheral edema  Abd:  Soft, non-tender, non-distended, normoactive bowel sounds are present  Musc:  No cyanosis or clubbing  Skin:  No rashes or ulcers, skin turgor is good  Neuro:  Cranial nerves 3-12 are grossly intact,  strength is 5/5 bilaterally and dorsi / plantarflexion is 5/5 bilaterally, follows commands appropriately  Psych:  Good insight, oriented to person, place and time, alert    Medications Reviewed: (see below)    Lab Data Reviewed: (see below)    ______________________________________________________________________    Medications:     Current Facility-Administered Medications   Medication Dose Route Frequency    vancomycin (VANCOCIN) 1,250 mg in 0.9% sodium chloride 250 mL (VIAL-MATE)  1,250 mg IntraVENous Q12H    sodium chloride (NS) flush 5-10 mL  5-10 mL IntraVENous PRN    acyclovir (ZOVIRAX) 650 mg in 0.9% sodium chloride 100 mL IVPB  10 mg/kg IntraVENous Q8H    sodium chloride (NS) flush 5-40 mL  5-40 mL IntraVENous Q8H    sodium chloride (NS) flush 5-40 mL  5-40 mL IntraVENous PRN    acetaminophen (TYLENOL) tablet 650 mg  650 mg Oral Q6H PRN    Or    acetaminophen (TYLENOL) suppository 650 mg  650 mg Rectal Q6H PRN    polyethylene glycol (MIRALAX) packet 17 g  17 g Oral DAILY PRN    ondansetron (ZOFRAN ODT) tablet 4 mg  4 mg Oral Q8H PRN    Or    ondansetron (ZOFRAN) injection 4 mg  4 mg IntraVENous Q6H PRN    cefTRIAXone (ROCEPHIN) 2 g in 0.9% sodium chloride 20 mL IV syringe  2 g IntraVENous Q24H    doxycycline (VIBRA-TABS) tablet 100 mg  100 mg Oral Q12H    oxyCODONE IR (ROXICODONE) tablet 10 mg  10 mg Oral Q6H PRN    HYDROmorphone (DILAUDID) injection 1 mg  1 mg IntraVENous Q6H PRN    aspirin chewable tablet 81 mg  81 mg Oral DAILY    cilostazoL (PLETAL) tablet 100 mg  100 mg Oral DAILY    lidocaine (XYLOCAINE) 4 % cream   Topical BID PRN    [Held by provider] NIFEdipine ER (PROCARDIA XL) tablet 30 mg  30 mg Oral DAILY    polyethylene glycol (MIRALAX) packet 17 g  17 g Oral DAILY            Lab Review:     Recent Labs     03/20/22  0438 03/19/22  0351 03/18/22  1555   WBC 6.9 9.8 16.5*   HGB 8.6* 9.2* 11.4*   HCT 27.5* 29.2* 35.7    229 256     Recent Labs     03/20/22  0438 03/19/22  0351 03/18/22  1555   NA  --  139 133*   K  --  3.8 5.3*   CL  --  109* 103   CO2  --  25 24   GLU  --  110* 91   BUN  --  5* 8   CREA 0.73 0.84 0.95   CA  --  8.4* 9.3   ALB  --  2.6* 3.2*   ALT  --  34 41     No components found for: Wilfredo Point

## 2022-03-20 NOTE — PROGRESS NOTES
Bedside shift change report given to [facility] (oncoming nurse) by Zehra (offgoing nurse). Report included the following information SBAR, Kardex, ED Summary, Procedure Summary, Intake/Output, MAR, Recent Results and Cardiac Rhythm Sinus tachy. 1215 Pt was taken to MRI. 1340 Pt returned from MRI. Bedside shift change report given to [facility] (oncoming nurse) by [facility] (offgoing nurse). Report included the following information SBAR, Kardex, ED Summary, Procedure Summary, Intake/Output, MAR, Recent Results and Cardiac Rhythm Sinus tachy.

## 2022-03-20 NOTE — CONSULTS
Neurology Consult - Inpatient      Name:   Jennie Perez  MRN:    [id number]    Date of Admission:  3/18/2022    Date of Consultation:  03/20/22       HISTORY OF PRESENT ILLNESS:     This is a 40 y.o. female with  has a past medical history of History of vascular access device (03/08/2022), Raynaud's disease, and Rheumatoid arthritis ([facility] [address] .    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 Reviewed Dr Sarah Camarillo Note (Hospitalist [facility] with question regarding potential repeat steroids due to pt with unilateral head/ neck pain/ +/- facial pain/ RUE pain-paresthesia, in setting of known recent-active VZV CNS infection and myelitis. Pt's RA meds were put on hold after she was dx with the VZV infection. Reviewed Neurology Consult/ progress notes by Dr Maia Stevens and Tere Mendoza (3-4-22 to 3-9-22), reviewed ID consult/ progress notes from last admission (3-3-22 to 3-9-22). Reviewed most recent/ repeat LP results (CSF): Cell count 1 (WBC 13, RBC 6020, cloudy, pink), Gram stain rare WBCs/ no organisms seen, Culture NGTD, Glucose 59, Protein 51, Meningitis pathogens panel all negative, Cell count 4 (colorless/ slightly pink/ cloudy, 4 WBCs, 2575 RBCs), WNV IgG/ IgM pending. CSF WBCs are trending down (last WBC on 3-4-21 was 31). Repeat Brain MRI and C-spine MRI (both +/- contrast) are pending    Pt reports that she has been having tingling/ paresthesias/ involving top/ right side of head extending down to neck, neck pressure, right arm pain/ paresthesia since dx of VZV infection. Does endorse mild tenderness in right temporal region but denies any right eye vision loss (says she's been checking vision at home by closing one eye at a time), and denies any pain in right or left mandible with chewing. Denies any pain in right face.  Says the head/ neck/ arm paresthesias are mildly improved compared to onset of symptoms. She attributes the pain relief to being on Oxycodone 10 mg prn. Tried low-dose Gabapentin 100 mg TID but says it didn't help. Denies any SEFx from gabapentin that she can remember.      Complete Review of Systems: reviewed on admission H&P    ====================================     No Known Allergies    Current Outpatient Medications   Medication Instructions    acyclovir 667 mg 10 mg/kg, IntraVENous, EVERY 8 HOURS    aspirin 81 mg, Oral, DAILY    cilostazoL (PLETAL) 100 mg, Oral, DAILY    drospirenone, contraceptive, (Slynd) 4 mg (28) tab 1 Tablet, Oral, DAILY    gabapentin (NEURONTIN) 100 mg, Oral, 3 TIMES DAILY    lidocaine (XYLOCAINE) 4 % topical cream Topical, 2 TIMES DAILY AS NEEDED    NIFEdipine ER (ADALAT CC) 30 mg, Oral, EVERY BEDTIME    polyethylene glycol (MIRALAX) 17 g, Oral, DAILY       Current Facility-Administered Medications   Medication Dose Route Frequency Provider Last Rate Last Admin    vancomycin (VANCOCIN) 1,250 mg in 0.9% sodium chloride 250 mL (VIAL-MATE)  1,250 mg IntraVENous Q12H Magalene Banco,  mL/hr at 03/20/22 0643 1,250 mg at 03/20/22 0643    [START ON 3/21/2022] Vancomycin Trough Level 05:30  1 Each Other ONCE Magalene Banco, DO        sodium chloride (NS) flush 5-10 mL  5-10 mL IntraVENous PRN Cheryle Irwin, MD        acyclovir (ZOVIRAX) 650 mg in 0.9% sodium chloride 100 mL IVPB  10 mg/kg IntraVENous Q8H Cheryle Irwin,  mL/hr at 03/20/22 0440 650 mg at 03/20/22 0440    sodium chloride (NS) flush 5-40 mL  5-40 mL IntraVENous Q8H Magali Cece, DO   10 mL at 03/20/22 0828    sodium chloride (NS) flush 5-40 mL  5-40 mL IntraVENous PRN Magali Cece, DO        acetaminophen (TYLENOL) tablet 650 mg  650 mg Oral Q6H PRN Magali Cece, DO   650 mg at 03/19/22 2305    Or    acetaminophen (TYLENOL) suppository 650 mg  650 mg Rectal Q6H PRN Magali Cece, DO        polyethylene glycol (MIRALAX) packet 17 g  17 g Oral DAILY PRN Magali Cece, DO  ondansetron (ZOFRAN ODT) tablet 4 mg  4 mg Oral Q8H PRN Palmira Patron, DO        Or    [facility]ron [facility]) injection 4 mg  4 mg IntraVENous Q6H PRN Palmira Patron, DO   4 mg at 03/18/22 2248    cefTRIAXone (ROCEPHIN) 2 g in 0.9% sodium chloride 20 mL IV syringe  2 g IntraVENous Q24H Torsten Dimmer, DO   2 g at 03/19/22 1727    doxycycline (VIBRA-TABS) tablet 100 mg  100 mg Oral Q12H Torsten Dimmer, DO   100 mg at 03/20/22 0851    oxyCODONE IR (ROXICODONE) tablet 10 mg  10 mg Oral Q6H PRN Palmira Patron, DO   10 mg at 03/20/22 1146    HYDROmorphone (DILAUDID) injection 1 mg  1 mg IntraVENous Q6H PRN Palmira Patron, DO   1 mg at 03/20/22 0102    aspirin chewable tablet 81 mg  81 mg Oral DAILY Palmira Patron, DO   81 mg at 03/20/22 0532    cilostazoL (PLETAL) tablet 100 mg  100 mg Oral DAILY Palmira Patron, DO   100 mg at 03/20/22 5940    lidocaine (XYLOCAINE) 4 % cream   Topical BID PRN Palmira Patron, DO        [Held by provider] NIFEdipine ER (PROCARDIA XL) tablet 30 mg  30 mg Oral DAILY Randolph, Rebecca, DO        polyethylene glycol (MIRALAX) packet 17 g  17 g Oral DAILY Palmira Patron, DO   17 g at 03/20/22 2436       PSHx  has a past surgical history that includes hx gyn. SocHx  reports that she has never smoked. She does not have any smokeless tobacco history on file. She reports previous alcohol use. She reports previous drug use. x family history is not on file. PHYSICAL EXAM    Patient Vitals for the past 4 hrs:   Temp Pulse Resp BP SpO2   03/20/22 1043 98.8 °F (37.1 °C) (!) 109 20 (!) 97/54 96 %     General: Head: atraumatic. Eyes: conjunctiva clear. Neck: supple. Lungs: not examined. CV: not examined. Extremities: no edema. Skin: No rashes    Neurologic Exam:  Speech/ Language: no aphasia, no dysarthria. Alertness: oriented x 3.  CNs: Smell: not tested. Visual Fields (II): full to confrontation. Pupils (II): not examined  Funduscopic: not examined.   Extraocular movements (III, IV, VI): conjugate in all directions, no ALDAIR. Ptosis (III, VII): none. Facial Sensation (V): reduced LT in right V2-3, normal in right V1); normal LT in left V1-2-3. Facial Movements (VII): symmetric at rest and on activation. Hearing (VIII): normal.  Soft palate elevation (IX): not examined. Shoulder shrug (XI): symmetric, strong. Tongue protrusion (XII): not examined    Motor:  4+/5 RUE, 5/5 LUE . Sensory: mild reduced LT in right arm; normal in left arm and both legs. Cerebellar: no resting tremors . Deep Tendon Reflexes / Plantar response/ Gait/ Romberg: deferred      Labs/ Radiology     Labs: reviewed as noted above    Imaging:    No results found. Assessment/ Plan       ICD-10-CM ICD-9-CM    1. Sepsis, due to unspecified organism, unspecified whether acute organ dysfunction present ([facility] [address]  A41.9 038.9      995.91    2. Varicella encephalitis  B01.11 052.0    3. Fever and chills  R50.9 780.60    4. Leukocytosis, unspecified type  D72.829 288.60    5. 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 Will start her on 300 mg TID today and if she cannot tell a difference by tomorrow, will increase it to 600 mg TID assuming she isn't having any side effects from it (drowsiness, confusion, etc). Will await MRI Brain/ C-spine results before opining on repeat IV steroids. Will f/u tomorrow      Thank you for asking the Neurology Service to evaluate Kenia Victoria.       Signed By: Negar Salgado MD     March 20, 2022

## 2022-03-20 NOTE — PROGRESS NOTES
Reason for Readmission:    meningitis          RUR Score/Risk Level:     13%    PCP: First and Last name:  Jabari Whittington DO   Name of Practice:    Are you a current patient: Yes/No: yes   Approximate date of last visit: 3/16   Can you participate in a virtual visit with your PCP: yes    Is a Care Conference indicated:  no      Did you attend your follow up appointment (s): If not, why not:  yes         Resources/supports as identified by patient/family:   , mom, family       Top Challenges facing patient (as identified by patient/family and CM): Finances/Medication cost?     No problem; medications from [facility] on PLAXD   Support system or lack thereof? See above   Living arrangements? 2 story house with 6 entry steps and 13 interior stairs; lives with her         Self-care/ADLs/Cognition? Pt is independent with ADL's, drives and uses no DME. She is A[employer]OX[employer]. Current Advanced Directive/Advance Care Plan:  Yes, on file           Plan for utilizing home health:   none             Transition of Care Plan:    Based on readmission, the patient's previous Plan of Care   has been evaluated and/or modified. The current Transition of Care Plan is:         1. Family to transport home at d/c  2. Neuro, ID following  3. CM following for any needs  4. Pt to f/u OP    Care Management Interventions  PCP Verified by CM: Yes  Mode of Transport at Discharge:  Other (see comment)  Support Systems: Spouse/Significant Other,Parent(s)  Confirm Follow Up Transport: Family  Discharge Location  Patient Expects to be Discharged to[de-identified] Home with infusion therapy    Readmission Assessment  Number of days since last admission?: 8-30 days  Previous disposition: Other (comment) (home with IV infusion)  Who is being interviewed?: Patient  What was the patient's/caregiver's perception as to why they think they needed to return back to the hospital?: Other (Comment) (ID told her to come back)  Did you visit your Primary Care Physician after you left the hospital, before you returned this time?: Yes  Did you see a specialist, such as Cardiac, Pulmonary, Orthopedic Physician, etc. after you left the hospital?: Yes  Who advised the patient to return to the hospital?: Other (Comment) (ID)  Does the patient report anything that got in the way of taking their medications?: No  In our efforts to provide the best possible care to you and others like you, can you think of anything that we could have done to help you after you left the hospital the first time, so that you might not have needed to return so soon?: Other (Comment) (no)  HUMBLE Wakefield

## 2022-03-20 NOTE — PROGRESS NOTES
1900- Bedside and Verbal shift change report given to Ileana Cobos (oncoming nurse) by Tasha Mak (offgoing nurse). Report included the following information SBAR, Kardex, Intake/Output, MAR and Cardiac Rhythm ST. This patient was assisted with Intentional Toileting every 2 hours during this shift as appropriate. Documentation of ambulation and output reflected on Flowsheet as appropriate. Purposeful hourly rounding was completed using AIDET and 5Ps. Outcomes of PHR documented as they occurred. Bed alarm in use as appropriate. Dual Suction and ambubag in place. Patient refused shift CHG bath because she states her mother will come in around 0730 to do her bed bath. Dressing change completed with sterile procedure on PICC.    0700- Bedside and Verbal shift change report given to Manish Espinosa (oncoming nurse) by Ileana Cobos (offgoing nurse).  Report included the following information SBAR, Kardex, Intake/Output, MAR, Recent Results and Cardiac Rhythm ST.

## 2022-03-21 LAB
ANA SER QL: POSITIVE
BASOPHILS # BLD: 0 K/UL (ref 0–0.1)
BASOPHILS NFR BLD: 0 % (ref 0–1)
CENTROMERE B AB SER-ACNC: <0.2 AI (ref 0–0.9)
CHROMATIN AB SERPL-ACNC: <0.2 AI (ref 0–0.9)
CREAT SERPL-MCNC: 0.71 MG/DL (ref 0.55–1.02)
DATE LAST DOSE: ABNORMAL
DIFFERENTIAL METHOD BLD: ABNORMAL
DSDNA AB SER-ACNC: <1 IU/ML (ref 0–9)
ENA JO1 AB SER-ACNC: <0.2 AI (ref 0–0.9)
ENA RNP AB SER-ACNC: <0.2 AI (ref 0–0.9)
ENA SCL70 AB SER-ACNC: <0.2 AI (ref 0–0.9)
ENA SM AB SER-ACNC: <0.2 AI (ref 0–0.9)
ENA SS-A AB SER-ACNC: 1.6 AI (ref 0–0.9)
ENA SS-B AB SER-ACNC: <0.2 AI (ref 0–0.9)
EOSINOPHIL # BLD: 0 K/UL (ref 0–0.4)
EOSINOPHIL NFR BLD: 0 % (ref 0–7)
ERYTHROCYTE [DISTWIDTH] IN BLOOD BY AUTOMATED COUNT: 15.5 % (ref 11.5–14.5)
HCT VFR BLD AUTO: 30.9 % (ref 35–47)
HGB BLD-MCNC: 9.8 G/DL (ref 11.5–16)
IMM GRANULOCYTES # BLD AUTO: 0 K/UL (ref 0–0.04)
IMM GRANULOCYTES NFR BLD AUTO: 0 % (ref 0–0.5)
LYMPHOCYTES # BLD: 0.6 K/UL (ref 0.8–3.5)
LYMPHOCYTES NFR BLD: 9 % (ref 12–49)
M PNEUMO IGG SER IA-ACNC: 168 U/ML (ref 0–99)
M PNEUMO IGM SER IA-ACNC: <770 U/ML (ref 0–769)
MCH RBC QN AUTO: 27.5 PG (ref 26–34)
MCHC RBC AUTO-ENTMCNC: 31.7 G/DL (ref 30–36.5)
MCV RBC AUTO: 86.8 FL (ref 80–99)
MONOCYTES # BLD: 0.1 K/UL (ref 0–1)
MONOCYTES NFR BLD: 1 % (ref 5–13)
NEUTS SEG # BLD: 5.5 K/UL (ref 1.8–8)
NEUTS SEG NFR BLD: 90 % (ref 32–75)
NRBC # BLD: 0 K/UL (ref 0–0.01)
NRBC BLD-RTO: 0 PER 100 WBC
PLATELET # BLD AUTO: 265 K/UL (ref 150–400)
PMV BLD AUTO: 8.6 FL (ref 8.9–12.9)
PROCALCITONIN SERPL-MCNC: 0.08 NG/ML
RBC # BLD AUTO: 3.56 M/UL (ref 3.8–5.2)
RBC MORPH BLD: ABNORMAL
REPORTED DOSE,DOSE: ABNORMAL UNITS
REPORTED DOSE/TIME,TMG: 1800
SEE BELOW, 164869: ABNORMAL
VANCOMYCIN TROUGH SERPL-MCNC: 13.8 UG/ML (ref 5–10)
WBC # BLD AUTO: 6.2 K/UL (ref 3.6–11)
WNV IGG SPEC QL IA: NEGATIVE
WNV IGM CSF QL IA: NEGATIVE

## 2022-03-21 PROCEDURE — 74011250637 HC RX REV CODE- 250/637: Performed by: INTERNAL MEDICINE

## 2022-03-21 PROCEDURE — 36415 COLL VENOUS BLD VENIPUNCTURE: CPT

## 2022-03-21 PROCEDURE — 74011000258 HC RX REV CODE- 258: Performed by: EMERGENCY MEDICINE

## 2022-03-21 PROCEDURE — 99233 SBSQ HOSP IP/OBS HIGH 50: CPT | Performed by: PSYCHIATRY & NEUROLOGY

## 2022-03-21 PROCEDURE — 85025 COMPLETE CBC W/AUTO DIFF WBC: CPT

## 2022-03-21 PROCEDURE — 74011250636 HC RX REV CODE- 250/636: Performed by: INTERNAL MEDICINE

## 2022-03-21 PROCEDURE — 74011250636 HC RX REV CODE- 250/636: Performed by: EMERGENCY MEDICINE

## 2022-03-21 PROCEDURE — 74011000250 HC RX REV CODE- 250: Performed by: INTERNAL MEDICINE

## 2022-03-21 PROCEDURE — 74011250637 HC RX REV CODE- 250/637: Performed by: PSYCHIATRY & NEUROLOGY

## 2022-03-21 PROCEDURE — 80202 ASSAY OF VANCOMYCIN: CPT

## 2022-03-21 PROCEDURE — 84145 PROCALCITONIN (PCT): CPT

## 2022-03-21 PROCEDURE — 82565 ASSAY OF CREATININE: CPT

## 2022-03-21 PROCEDURE — 65270000029 HC RM PRIVATE

## 2022-03-21 RX ORDER — DEXAMETHASONE 4 MG/1
4 TABLET ORAL EVERY 6 HOURS
Status: DISCONTINUED | OUTPATIENT
Start: 2022-03-21 | End: 2022-03-23

## 2022-03-21 RX ADMIN — OXYCODONE 10 MG: 5 TABLET ORAL at 08:04

## 2022-03-21 RX ADMIN — SODIUM CHLORIDE, PRESERVATIVE FREE 10 ML: 5 INJECTION INTRAVENOUS at 14:19

## 2022-03-21 RX ADMIN — DEXAMETHASONE 4 MG: 4 TABLET ORAL at 23:17

## 2022-03-21 RX ADMIN — DEXAMETHASONE 4 MG: 4 TABLET ORAL at 12:00

## 2022-03-21 RX ADMIN — DEXAMETHASONE SODIUM PHOSPHATE 4 MG: 4 INJECTION, SOLUTION INTRAMUSCULAR; INTRAVENOUS at 05:04

## 2022-03-21 RX ADMIN — OXYCODONE 10 MG: 5 TABLET ORAL at 14:21

## 2022-03-21 RX ADMIN — GABAPENTIN 300 MG: 300 CAPSULE ORAL at 21:41

## 2022-03-21 RX ADMIN — GABAPENTIN 300 MG: 300 CAPSULE ORAL at 08:05

## 2022-03-21 RX ADMIN — ACYCLOVIR SODIUM 650 MG: 50 INJECTION, SOLUTION INTRAVENOUS at 21:41

## 2022-03-21 RX ADMIN — ASPIRIN 81 MG: 81 TABLET, CHEWABLE ORAL at 08:05

## 2022-03-21 RX ADMIN — POLYETHYLENE GLYCOL 3350 17 G: 17 POWDER, FOR SOLUTION ORAL at 08:04

## 2022-03-21 RX ADMIN — DEXAMETHASONE 4 MG: 4 TABLET ORAL at 17:18

## 2022-03-21 RX ADMIN — SODIUM CHLORIDE, PRESERVATIVE FREE 10 ML: 5 INJECTION INTRAVENOUS at 05:04

## 2022-03-21 RX ADMIN — SODIUM CHLORIDE, PRESERVATIVE FREE 10 ML: 5 INJECTION INTRAVENOUS at 21:41

## 2022-03-21 RX ADMIN — HYDROMORPHONE HYDROCHLORIDE 1 MG: 1 INJECTION, SOLUTION INTRAMUSCULAR; INTRAVENOUS; SUBCUTANEOUS at 16:02

## 2022-03-21 RX ADMIN — ACYCLOVIR SODIUM 650 MG: 50 INJECTION, SOLUTION INTRAVENOUS at 14:18

## 2022-03-21 RX ADMIN — GABAPENTIN 300 MG: 300 CAPSULE ORAL at 16:00

## 2022-03-21 RX ADMIN — CILOSTAZOL 100 MG: 100 TABLET ORAL at 08:05

## 2022-03-21 RX ADMIN — ACYCLOVIR SODIUM 650 MG: 50 INJECTION, SOLUTION INTRAVENOUS at 05:04

## 2022-03-21 RX ADMIN — OXYCODONE 10 MG: 5 TABLET ORAL at 23:17

## 2022-03-21 RX ADMIN — VANCOMYCIN HYDROCHLORIDE 1250 MG: 1.25 INJECTION, POWDER, LYOPHILIZED, FOR SOLUTION INTRAVENOUS at 05:04

## 2022-03-21 NOTE — PROGRESS NOTES
[facility]  [address] [facility], [place], [address] [place] [address]  [phone]      Medical Progress Note      NAME: Eli Porter   :  1985  MRM:  [id number]    Date/Time: 3/21/2022         Assessment / Plan:     #Fever: POA. P/w leukocytosis tachycardia but no obvious suspected source. WBC and fever improved with abx, but no clear source ever identified. No e/o pneumonia, UTI, cellulitis. LP reassuring and receiving tx for VZV. No obvious skin lesions. BCx NGTD. Consider RA related, but she has no active synovitis to suggest flare. She endorses unilateral head/neck pain, burning pressure so GCA a consideration, but less likely. Does have a trigeminal distribution, so potentially related to central lesion as known. ESR fairly elevated. Did start steroids. Procal low today, will dc abx and monitor over night   - Cont acyclovir   - stop abx   - F/u blood cultures, propionobacterium can take up to 5 days   - Check  JOJO, ANCA   - Dexamethasone x 3 days   - Repeat brain, cervical spine MRI imrpoved     #Sinus tachycardia: Echo normal. CTA neg for PE. Does have pain, potentially untreated. Euvolemic and infx controlled, if present at all. 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  Infection due to immunocompromised state. S/p LP during prior hospitalization and repeat LP 3/18 in ER reassuring. Aquaporin Ab neg. - Acyclovir   - Repeat imaging improved   - Neuro following, appreciate recs    #RUE Pain and paresthesias: POA, likely due to myelitis.  Completed high dose IV steroids on prior admission. Dopplers neg   - Dex     #Hyperkalemia/ hyponatremia: Suspect due to intravascular volume depletion.      #Rheumatoid arthritis/ Chronic immunosupppression: Last dose of upadacitinib early March due to varicella zoster infection.  No active synovitis on exam     Raynaud's: Continue nifedipine, pletal; hold ccb with hypotension               Discussed:  Care Plan    Prophylaxis:  Lovenox    Disposition:  Home w/Family           ___________________________________________________    Attending Physician: Randie Harada, MD        Subjective:     Chief Complaint:  [place], feels better today. Notes pain in IV when dex infusing, but otherwise making progress. (bold if positive, if negative)    Tolerating PT  Tolerating Diet          Objective:       Vitals:          Last 24hrs VS reviewed since prior progress note.  Most recent are:    Visit Vitals  /69 (BP 1 Location: Left upper arm, BP Patient Position: At rest)   Pulse 100   Temp 97.8 °F (36.6 °C)   Resp 21   Ht 5' 4\" (1.626 m)   Wt 70.6 kg (155 lb 11.2 oz)   SpO2 100%   BMI 26.73 kg/m²     SpO2 Readings from Last 6 Encounters:   03/21/22 100%   03/14/22 98%   03/09/22 98%            Intake/Output Summary (Last 24 hours) at 3/21/2022 4600  Last data filed at 3/20/2022 1954  Gross per 24 hour   Intake 200 ml   Output 350 ml   Net -150 ml          Exam:     Physical Exam:    Gen:  Well-developed, well-nourished, in no acute distress  HEENT:  Pink conjunctivae, PERRL, hearing intact to voice, moist mucous membranes  Neck:  Supple, without masses, thyroid non-tender  Resp:  No accessory muscle use, clear breath sounds without wheezes rales or rhonchi  Card:  No murmurs, normal S1, S2 without thrills, bruits or peripheral edema  Abd:  Soft, non-tender, non-distended, normoactive bowel sounds are present  Musc:  No cyanosis or clubbing  Skin:  No rashes or ulcers, skin turgor is good  Neuro:  Cranial nerves 3-12 are grossly intact,  strength is 5/5 bilaterally and dorsi / plantarflexion is 5/5 bilaterally, follows commands appropriately  Psych:  Good insight, oriented to person, place and time, alert    Medications Reviewed: (see below)    Lab Data Reviewed: (see below)    ______________________________________________________________________    Medications:     Current Facility-Administered Medications   Medication Dose Route Frequency    dexAMETHasone (DECADRON) tablet 4 mg  4 mg Oral Q6H    gabapentin (NEURONTIN) capsule 300 mg  300 mg Oral TID    sodium chloride (NS) flush 5-10 mL  5-10 mL IntraVENous PRN    acyclovir (ZOVIRAX) 650 mg in 0.9% sodium chloride 100 mL IVPB  10 mg/kg IntraVENous Q8H    sodium chloride (NS) flush 5-40 mL  5-40 mL IntraVENous Q8H    sodium chloride (NS) flush 5-40 mL  5-40 mL IntraVENous PRN    acetaminophen (TYLENOL) tablet 650 mg  650 mg Oral Q6H PRN    Or    acetaminophen (TYLENOL) suppository 650 mg  650 mg Rectal Q6H PRN    polyethylene glycol (MIRALAX) packet 17 g  17 g Oral DAILY PRN    ondansetron (ZOFRAN ODT) tablet 4 mg  4 mg Oral Q8H PRN    Or    ondansetron (ZOFRAN) injection 4 mg  4 mg IntraVENous Q6H PRN    oxyCODONE IR (ROXICODONE) tablet 10 mg  10 mg Oral Q6H PRN    HYDROmorphone (DILAUDID) injection 1 mg  1 mg IntraVENous Q6H PRN    aspirin chewable tablet 81 mg  81 mg Oral DAILY    cilostazoL (PLETAL) tablet 100 mg  100 mg Oral DAILY    lidocaine (XYLOCAINE) 4 % cream   Topical BID PRN    [Held by provider] NIFEdipine ER (PROCARDIA XL) tablet 30 mg  30 mg Oral DAILY    polyethylene glycol (MIRALAX) packet 17 g  17 g Oral DAILY            Lab Review:     Recent Labs     03/21/22  0329 03/20/22  0438 03/19/22  0351   WBC 6.2 6.9 9.8   HGB 9.8* 8.6* 9.2*   HCT 30.9* 27.5* 29.2*    218 229     Recent Labs     03/21/22  0329 03/20/22  0438 03/19/22  0351 03/18/22  1555 03/18/22  1555   NA  --   --  139  --  133*   K  --   --  3.8  --  5.3*   CL  --   --  109*  --  103   CO2  --   --  25  --  24   GLU  --   --  110*  --  91   BUN  --   --  5*  --  8   CREA 0.71 0.73 0.84   < > 0.95   CA  --   --  8.4*  --  9.3   ALB  --   --  2.6*  --  3.2*   ALT  --   --  34  --  41    < > = values in this interval not displayed.      No components found for: [place]

## 2022-03-21 NOTE — PROGRESS NOTES
Transition of Care Plan: RUR-12%  1. Family to transport home at d/c  2. Neuro, ID following  3. CM following for any needs  4.  Pt to f/u OP   MARCK Horan

## 2022-03-21 NOTE — PROGRESS NOTES
Problem: Falls - Risk of  Goal: *Absence of Falls  Description: Document Tiffanie Jessejulitoaf[place] Fall Risk and appropriate interventions in the flowsheet.   Outcome: Progressing Towards Goal  Note: Fall Risk Interventions:            Medication Interventions: Bed/chair exit alarm,Patient to call before getting OOB                   Problem: Patient Education: Go to Patient Education Activity  Goal: Patient/Family Education  Outcome: Progressing Towards Goal     Problem: Pain  Goal: *Control of Pain  Outcome: Progressing Towards Goal     Problem: Patient Education: Go to Patient Education Activity  Goal: Patient/Family Education  Outcome: Progressing Towards Goal

## 2022-03-21 NOTE — PROGRESS NOTES
Neurology - Inpatient Progress Note     Name:   Jarvis Painting  MRN:    [id number]  [address] [facility] Date:   3/18/2022    Follow up:   Recent VZV Meningoencephalitis and Myelitis, neuropathic pain    Interval History     Repeat MRI Brain shows some improvement / reduction in signal abnormality in right medulla and cervical-medullary junction. No new intracranial abnormalities. Repeat MRI C-spine shows \"Significant interval improvement in previous extensive nonenhancing intramedullary signal abnormality extending from the dorsal medulla to the C5 level; now, signal abnormality only extends from the inferior medulla to the mid C2 level. Previous mild expansion of the cervical spinal cord has   also improved. No areas of abnormal intramedullary enhancement. \"    Added Gabapentin 300 mg TID yesterday and pt was started on Decadron last PM by Dr Latoya Shukla.  D/w him today and he has d/w ID and as most recent CSF labs do not suggest any new CNS infection, antiviral meds will be tapered off. Pt reports right sided paresthesias (right side head, neck, and down right arm) are reduced today. Discussed the updated MRI findings (improvement). No new neurologic complaints.      Awake, alert, resting, normal speech, normal hearing  Moving both arms w/o difficulty      Brief ROS: As noted above         No Known Allergies    Current Facility-Administered Medications:     dexAMETHasone (DECADRON) tablet 4 mg, 4 mg, Oral, Q6H, Caimlla Valdivia MD    gabapentin (NEURONTIN) capsule 300 mg, 300 mg, Oral, TID, Vu Jeffrey MD, 300 mg at 03/21/22 0805    sodium chloride (NS) flush 5-10 mL, 5-10 mL, IntraVENous, PRN, Ha Torres MD    acyclovir (ZOVIRAX) 650 mg in 0.9% sodium chloride 100 mL IVPB, 10 mg/kg, IntraVENous, Q8H, Ha Torres MD, Last Rate: 100 mL/hr at 03/21/22 0504, 650 mg at 03/21/22 0504    sodium chloride (NS) flush 5-40 mL, 5-40 mL, IntraVENous, Q8H, Rebecca Dickson DO, 10 mL at 03/21/22 0504   sodium chloride (NS) flush 5-40 mL, 5-40 mL, IntraVENous, PRN, Randolph, Rebecca, DO    acetaminophen (TYLENOL) tablet 650 mg, 650 mg, Oral, Q6H PRN, 650 mg at 03/19/22 2305 **OR** acetaminophen (TYLENOL) suppository 650 mg, 650 mg, Rectal, Q6H PRN, Randolph, Rebecca, DO    polyethylene glycol (MIRALAX) packet 17 g, 17 g, Oral, DAILY PRN, Randolph, Rebecca, DO    ondansetron (ZOFRAN ODT) tablet 4 mg, 4 mg, Oral, Q8H PRN **OR** ondansetron (ZOFRAN) injection 4 mg, 4 mg, IntraVENous, Q6H PRN, Randolph, Rebecca, DO, 4 mg at 03/18/22 2248    oxyCODONE IR (ROXICODONE) tablet 10 mg, 10 mg, Oral, Q6H PRN, Randolph, Rebecca, DO, 10 mg at 03/21/22 0804    HYDROmorphone (DILAUDID) injection 1 mg, 1 mg, IntraVENous, Q6H PRN, Randolph, Tiffany Baldomero, DO, 1 mg at 03/20/22 1516    aspirin chewable tablet 81 mg, 81 mg, Oral, DAILY, Ilah Mule, DO, 81 mg at 03/21/22 0805    cilostazoL (PLETAL) tablet 100 mg, 100 mg, Oral, DAILY, Randolph, Rebecca, DO, 100 mg at 03/21/22 0805    lidocaine (XYLOCAINE) 4 % cream, , Topical, BID PRN, Randolph, Rebecca, DO    [Held by provider] NIFEdipine ER (PROCARDIA XL) tablet 30 mg, 30 mg, Oral, DAILY, Randolph, Rebecca, DO    polyethylene glycol (MIRALAX) packet 17 g, 17 g, Oral, DAILY, Randolph, Rebecca, DO, 17 g at 03/21/22 0804      PMHx: has a past medical history of History of vascular access device (03/08/2022), Raynaud's disease, and Rheumatoid arthritis ([facility] [address] PSHx: has a past surgical history that includes hx gyn. Impression / Plan       ICD-10-CM ICD-9-CM   1. Sepsis, due to unspecified organism, unspecified whether acute organ dysfunction present ([facility] [address]  A41.9 038.9     995.91   2. Varicella encephalitis  B01.11 052.0   3. Fever and chills  R50.9 780.60   4. Leukocytosis, unspecified type  D72.829 288.60   5. Tachycardia  R00.0 785.0   6. Acute viral transverse myelitis (HCC)  G37.3 341.20    B34.9 079.99   7. Neuropathic pain  M79.2 729.2   8. Neuropathic pain, arm  M79.2 723.4   9.  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

## 2022-03-21 NOTE — PROGRESS NOTES
Bedside and Verbal shift change report given to [facility] (oncoming nurse) by Reji Pino (offgoing nurse). Report included the following information SBAR, Kardex, MAR, Accordion and Recent Results.

## 2022-03-22 LAB
C-ANCA TITR SER IF: NORMAL TITER
MYELOPEROXIDASE AB SER IA-ACNC: <9 U/ML (ref 0–9)
P-ANCA ATYPICAL TITR SER IF: NORMAL TITER
P-ANCA TITR SER IF: NORMAL TITER
PROTEINASE3 AB SER IA-ACNC: <3.5 U/ML (ref 0–3.5)

## 2022-03-22 PROCEDURE — 74011250637 HC RX REV CODE- 250/637: Performed by: INTERNAL MEDICINE

## 2022-03-22 PROCEDURE — 65270000029 HC RM PRIVATE

## 2022-03-22 PROCEDURE — 74011250636 HC RX REV CODE- 250/636: Performed by: EMERGENCY MEDICINE

## 2022-03-22 PROCEDURE — 74011250637 HC RX REV CODE- 250/637: Performed by: PSYCHIATRY & NEUROLOGY

## 2022-03-22 PROCEDURE — 74011250636 HC RX REV CODE- 250/636: Performed by: INTERNAL MEDICINE

## 2022-03-22 PROCEDURE — 74011000250 HC RX REV CODE- 250: Performed by: INTERNAL MEDICINE

## 2022-03-22 PROCEDURE — 74011000258 HC RX REV CODE- 258: Performed by: EMERGENCY MEDICINE

## 2022-03-22 PROCEDURE — 2709999900 HC NON-CHARGEABLE SUPPLY

## 2022-03-22 PROCEDURE — 99233 SBSQ HOSP IP/OBS HIGH 50: CPT | Performed by: STUDENT IN AN ORGANIZED HEALTH CARE EDUCATION/TRAINING PROGRAM

## 2022-03-22 RX ADMIN — ACYCLOVIR SODIUM 650 MG: 50 INJECTION, SOLUTION INTRAVENOUS at 14:47

## 2022-03-22 RX ADMIN — ACYCLOVIR SODIUM 650 MG: 50 INJECTION, SOLUTION INTRAVENOUS at 22:59

## 2022-03-22 RX ADMIN — DEXAMETHASONE 4 MG: 4 TABLET ORAL at 17:46

## 2022-03-22 RX ADMIN — CILOSTAZOL 100 MG: 100 TABLET ORAL at 09:15

## 2022-03-22 RX ADMIN — GABAPENTIN 300 MG: 300 CAPSULE ORAL at 15:58

## 2022-03-22 RX ADMIN — SODIUM CHLORIDE, PRESERVATIVE FREE 10 ML: 5 INJECTION INTRAVENOUS at 14:47

## 2022-03-22 RX ADMIN — HYDROMORPHONE HYDROCHLORIDE 1 MG: 1 INJECTION, SOLUTION INTRAMUSCULAR; INTRAVENOUS; SUBCUTANEOUS at 09:14

## 2022-03-22 RX ADMIN — SODIUM CHLORIDE, PRESERVATIVE FREE 10 ML: 5 INJECTION INTRAVENOUS at 05:50

## 2022-03-22 RX ADMIN — OXYCODONE 10 MG: 5 TABLET ORAL at 14:47

## 2022-03-22 RX ADMIN — GABAPENTIN 300 MG: 300 CAPSULE ORAL at 09:15

## 2022-03-22 RX ADMIN — DEXAMETHASONE 4 MG: 4 TABLET ORAL at 12:14

## 2022-03-22 RX ADMIN — POLYETHYLENE GLYCOL 3350 17 G: 17 POWDER, FOR SOLUTION ORAL at 09:15

## 2022-03-22 RX ADMIN — GABAPENTIN 300 MG: 300 CAPSULE ORAL at 23:02

## 2022-03-22 RX ADMIN — SODIUM CHLORIDE, PRESERVATIVE FREE 10 ML: 5 INJECTION INTRAVENOUS at 22:58

## 2022-03-22 RX ADMIN — OXYCODONE 10 MG: 5 TABLET ORAL at 05:45

## 2022-03-22 RX ADMIN — OXYCODONE 10 MG: 5 TABLET ORAL at 23:02

## 2022-03-22 RX ADMIN — ASPIRIN 81 MG: 81 TABLET, CHEWABLE ORAL at 09:15

## 2022-03-22 RX ADMIN — DEXAMETHASONE 4 MG: 4 TABLET ORAL at 05:45

## 2022-03-22 RX ADMIN — ACYCLOVIR SODIUM 650 MG: 50 INJECTION, SOLUTION INTRAVENOUS at 05:45

## 2022-03-22 NOTE — PROGRESS NOTES
Problem: Falls - Risk of  Goal: *Absence of Falls  Description: Document Cherylle Cockayne Fall Risk and appropriate interventions in the flowsheet.   Outcome: Progressing Towards Goal  Note: Fall Risk Interventions:            Medication Interventions: Assess postural VS orthostatic hypotension,Patient to call before getting OOB,Teach patient to arise slowly                   Problem: Patient Education: Go to Patient Education Activity  Goal: Patient/Family Education  Outcome: Progressing Towards Goal     Problem: Pain  Goal: *Control of Pain  Outcome: Progressing Towards Goal  Goal: *PALLIATIVE CARE:  Alleviation of Pain  Outcome: Progressing Towards Goal     Problem: Patient Education: Go to Patient Education Activity  Goal: Patient/Family Education  Outcome: Progressing Towards Goal

## 2022-03-22 NOTE — PROGRESS NOTES
Problem: Falls - Risk of  Goal: *Absence of Falls  Description: Document Kevin Moy Fall Risk and appropriate interventions in the flowsheet.   Outcome: Progressing Towards Goal  Note: Fall Risk Interventions:            Medication Interventions: Assess postural VS orthostatic hypotension,Patient to call before getting OOB,Teach patient to arise slowly                   Problem: Patient Education: Go to Patient Education Activity  Goal: Patient/Family Education  Outcome: Progressing Towards Goal     Problem: Pain  Goal: *Control of Pain  Outcome: Progressing Towards Goal

## 2022-03-22 NOTE — PROGRESS NOTES
A Spiritual Care Partner Volunteer visited patient in Rm 504 on 3/22/2022.   Documented by:  Chaplain Santacruz MDiv, MS, [facility]

## 2022-03-22 NOTE — PROGRESS NOTES
1900 - Bedside and verbal shift change report given to Lyle Almonte RN (oncoming nurse) by , RN (offgoing nurse). Report included the following information: SBAR, Kardex, Intake/Output, MAR, Recent Results, and Med Rec Status. This patient was assisted with Intentional Toileting every 2 hours during this shift as appropriate. Documentation of ambulation and output reflected on Flowsheet as appropriate. Purposeful hourly rounding was completed using AIDET and 5Ps. Outcomes of PHR documented as they occurred. Bed alarm in use as appropriate. Dual Suction and ambubag in place. TRANSFER - OUT REPORT:    Verbal report given to Sharifa Maine (name) on Mariya Hyatt  being transferred to 5th floor med surg (unit) for routine progression of care       Report consisted of patients Situation, Background, Assessment and   Recommendations(SBAR). Information from the following report(s) SBAR, Kardex, Intake/Output, MAR and Recent Results was reviewed with the receiving nurse. Lines:   PICC Single Lumen 03/08/22 Right;Brachial (Active)   Central Line Being Utilized Yes 03/21/22 1950   Criteria for Appropriate Use Long term IV/antibiotic administration 03/21/22 1950   Site Assessment Clean, dry, & intact 03/21/22 1950   Phlebitis Assessment 0 03/21/22 1950   Infiltration Assessment 0 03/21/22 1950   Date of Last Dressing Change 03/21/22 03/21/22 1950   Dressing Status Clean, dry, & intact 03/21/22 1950   Dressing Type Disk with Chlorhexadine gluconate (CHG); Transparent 03/21/22 1950   Hub Color/Line Status Capped;Flushed 03/21/22 1950   Action Taken Open ports on tubing capped 03/21/22 1950   Positive Blood Return (Site #1) Yes 03/21/22 1950   Alcohol Cap Used Yes 03/21/22 1950        Opportunity for questions and clarification was provided.       Patient transported with:   Registered Nurse

## 2022-03-22 NOTE — PROGRESS NOTES
[facility]  Hospitalist Group                                                                                          Hospitalist Progress Note  Emily Clarke MD  Answering service: [phone] from in house phone        Date of Service:  3/22/2022  NAME:  Mariya Hyatt  :  1985  MRN:  [id number]      Admission Summary:   \"Ms. Jaswant García is a 40 y.o.  female with a past medical history of rheumatoid arthritis previously on upadacitinib, no nodes, tachycardia and recent acute varicella-zoster meningoencephalitis who is admitted with sepsis. Ms. Jaswant García was discharged on 2020 from [address] [place] [address] after being admitted for  acute varicella-zoster meningoencephalitis. During her hospital stay, she was treated with steroids and started on acyclovir. She was discharged with a PICC line and has completed 16 days of therapy. Today she followed up with infectious disease outpatient who sent her to the emergency room due to concern for fevers. Patient states she continues to have numbness, burning and tenderness of her neck and right upper extremity. She states the pain pressure in her neck right shoulder slightly improved. She also endorses some shortness of breath. She has some pain of her joints. She states that she has been experiencing some fevers at home. \"    Interval history / Subjective:   3/20/2022. Saw patient this morning, awake alert and oriented, no apparent distress, pleasant and cooperative with physical examination, still complaining of right-sided head and neck stiffness and subjective swelling otherwise denies any fever, chills, visual changes, numbness, tingling, weakness, bowel or bladder issues. Has been afebrile for the last 24 hours. Assessment & Plan:     Fever:   P/w leukocytosis tachycardia but no obvious suspected source. WBC and fever improved with abx, but no clear source ever identified. No e/o pneumonia, UTI, cellulitis.  LP reassuring and receiving tx for VZV. No obvious skin lesions. BCx NGTD. Consider RA related, but she has no active synovitis to suggest flare. She endorses unilateral head/neck pain, burning pressure so GCA a consideration, but less likely. Does have a trigeminal distribution, so potentially related to central lesion as known. ESR fairly elevated. Did start steroids. Procal low today, will dc abx and monitor over night              - Cont acyclovir              - stop abx              - F/u blood cultures, propionobacterium can take up to 5 days              - Check  JOJO, ANCA              - Dexamethasone x 3 days              - Repeat brain, cervical spine MRI imrpoved     Sinus tachycardia:  Echo normal. CTA neg for PE. Does have pain, potentially untreated. Euvolemic and infx controlled, if present at all. Improved today following steroids and increase in gabapentin              - Optimize volume, pain              - May consider FREDDIE at [place]     Acute myelitis / Acute Varicella Zoster Meningoencephalitis:   Infection due to immunocompromised state. S/p LP during prior hospitalization and repeat LP 3/18 in ER reassuring.  Aquaporin Ab neg. - Acyclovir              - Repeat imaging improved              - Neuro following, appreciate recs     RUE Pain and paresthesias: Likely due to myelitis.  Completed high dose IV steroids on prior admission.  Dopplers neg              - Dex     Hyperkalemia/ hyponatremia: Suspect due to intravascular volume depletion. Rheumatoid arthritis/ Chronic immunosupppression: Last dose of upadacitinib early March due to varicella zoster infection.  No active synovitis on exam     Raynaud's: Continue nifedipine, pletal; hold ccb with hypotension                                                             Code status: Full code  DVT prophylaxis: Lovenox    Care Plan discussed with: Patient/Family  Anticipated Disposition: Home w/Family  Anticipated Discharge: Less than 24 hours     Hospital Problems  Never Reviewed          Codes Class Noted POA    Meningitis ICD-10-CM: G03.9  ICD-9-CM: 322.9  3/18/2022 Unknown        Sepsis ([facility] [address] ICD-10-CM: A41.9  ICD-9-CM: 038.9, 995.91  3/18/2022 Unknown                Review of Systems:   A comprehensive review of systems was negative except for that written in the HPI. Vital Signs:    Last 24hrs VS reviewed since prior progress note. Most recent are:  Visit Vitals  /78 (BP 1 Location: Left upper arm, BP Patient Position: At rest)   Pulse 81   Temp 97.5 °F (36.4 °C)   Resp 16   Ht 5' 4\" (1.626 m)   Wt 70.6 kg (155 lb 11.2 oz)   SpO2 98%   BMI 26.73 kg/m²         Intake/Output Summary (Last 24 hours) at 3/22/2022 1402  Last data filed at 3/22/2022 7469  Gross per 24 hour   Intake 790 ml   Output --   Net 790 ml        Physical Examination:     I had a face to face encounter with this patient and independently examined them on 3/22/2022 as outlined below:          Constitutional:  No acute distress, cooperative, pleasant    ENT:  Oral mucosa moist, oropharynx benign. Resp:  CTA bilaterally. No wheezing/rhonchi/rales. No accessory muscle use   CV:  Regular rhythm, normal rate, no murmurs, gallops, rubs    GI:  Soft, non distended, non tender. normoactive bowel sounds, no hepatosplenomegaly     Musculoskeletal:  No edema, warm, 2+ pulses throughout    Neurologic:  Moves all extremities. AAOx3, CN II-XII reviewed            Data Review:    Review and/or order of clinical lab test      Labs:     Recent Labs     03/21/22  0329 03/20/22  0438   WBC 6.2 6.9   HGB 9.8* 8.6*   HCT 30.9* 27.5*    218     Recent Labs     03/21/22  0329 03/20/22  0438   CREA 0.71 0.73     No results for input(s): ALT, AP, TBIL, TBILI, TP, ALB, GLOB, GGT, AML, LPSE in the last 72 hours. No lab exists for component: SGOT, GPT, AMYP, HLPSE  No results for input(s): INR, PTP, APTT, INREXT in the last 72 hours.    No results for input(s): FE, TIBC, PSAT, FERR in the last 72 hours. No results found for: FOL, RBCF   No results for input(s): PH, PCO2, PO2 in the last 72 hours. No results for input(s): CPK, CKNDX, TROIQ in the last 72 hours.     No lab exists for component: CPKMB  Lab Results   Component Value Date/Time    Cholesterol, total 159 03/04/2022 03:10 AM    HDL Cholesterol 38 03/04/2022 03:10 AM    LDL, calculated 79.8 03/04/2022 03:10 AM    Triglyceride 206 (H) 03/04/2022 03:10 AM    CHOL/HDL Ratio 4.2 03/04/2022 03:10 AM     Lab Results   Component Value Date/Time    Glucose (POC) 73 03/03/2022 05:40 PM    Glucose (POC) 63 (L) 03/03/2022 05:06 PM    Glucose (POC) 43 (LL) 03/03/2022 04:08 PM    Glucose (POC) 49 (LL) 03/03/2022 04:05 PM     Lab Results   Component Value Date/Time    Color YELLOW/STRAW 03/18/2022 04:23 PM    Appearance CLOUDY (A) 03/18/2022 04:23 PM    Specific gravity 1.012 03/18/2022 04:23 PM    pH (UA) 6.0 03/18/2022 04:23 PM    Protein Negative 03/18/2022 04:23 PM    Glucose Negative 03/18/2022 04:23 PM    Ketone Negative 03/18/2022 04:23 PM    Bilirubin Negative 03/18/2022 04:23 PM    Urobilinogen 0.2 03/18/2022 04:23 PM    Nitrites Negative 03/18/2022 04:23 PM    Leukocyte Esterase TRACE (A) 03/18/2022 04:23 PM    Epithelial cells MANY (A) 03/18/2022 04:23 PM    Bacteria Negative 03/18/2022 04:23 PM    WBC 5-10 03/18/2022 04:23 PM    RBC 0-5 03/18/2022 04:23 PM         Medications Reviewed:     Current Facility-Administered Medications   Medication Dose Route Frequency    dexAMETHasone (DECADRON) tablet 4 mg  4 mg Oral Q6H    gabapentin (NEURONTIN) capsule 300 mg  300 mg Oral TID    sodium chloride (NS) flush 5-10 mL  5-10 mL IntraVENous PRN    acyclovir (ZOVIRAX) 650 mg in 0.9% sodium chloride 100 mL IVPB  10 mg/kg IntraVENous Q8H    sodium chloride (NS) flush 5-40 mL  5-40 mL IntraVENous Q8H    sodium chloride (NS) flush 5-40 mL  5-40 mL IntraVENous PRN    acetaminophen (TYLENOL) tablet 650 mg  650 mg Oral Q6H PRN    Or    acetaminophen (TYLENOL) suppository 650 mg  650 mg Rectal Q6H PRN    polyethylene glycol (MIRALAX) packet 17 g  17 g Oral DAILY PRN    ondansetron (ZOFRAN ODT) tablet 4 mg  4 mg Oral Q8H PRN    Or    ondansetron (ZOFRAN) injection 4 mg  4 mg IntraVENous Q6H PRN    oxyCODONE IR (ROXICODONE) tablet 10 mg  10 mg Oral Q6H PRN    HYDROmorphone (DILAUDID) injection 1 mg  1 mg IntraVENous Q6H PRN    aspirin chewable tablet 81 mg  81 mg Oral DAILY    cilostazoL (PLETAL) tablet 100 mg  100 mg Oral DAILY    lidocaine (XYLOCAINE) 4 % cream   Topical BID PRN    [Held by provider] NIFEdipine ER (PROCARDIA XL) tablet 30 mg  30 mg Oral DAILY    polyethylene glycol (MIRALAX) packet 17 g  17 g Oral DAILY     ______________________________________________________________________  EXPECTED LENGTH OF STAY: 3d 12h  ACTUAL LENGTH OF STAY:          4                 Graeme Fernandez MD

## 2022-03-22 NOTE — PROGRESS NOTES
Infectious Disease Progress Note         Interval:  NAEO    Subjective:   Patient seen this morning in follow-up. Reports feeling better. She reports that the numbness on her neck and her face is reducing day by day. She is reporting some numbness at the posterior orbital region but not intense. Pain is overall reduced. No significant headaches. Objective:    Vitals:   Reviewed in chart. Physical Exam:  Gen: No apparent distress  HEENT:  Normocephalic, atraumatic, no scleral icterus, no thrush,  CV: Hemodynamically stable  Lungs: On room air  Abdomen: soft, non tender, non distended  Genitourinary:  no bradley catheter   Skin: no rash, no active lesions of zoster are seen. Psych: good affect, good eye contact, non tearful  Neuro: alert, oriented to time,  place, and situation, moves all extremities to commands, verbal  Musculoskeletal:  No joint edema, erythema or tenderness noted   Lines: PICC line        Labs:  No results found for this or any previous visit (from the past 24 hour(s)). Assessment:  80-year-old with recent diagnosis of VZV meningoencephalitis and cervical cord myelitis. Afebrile this admission. MRI brain 3/20/2022 showing slight improvement in the nonenhancing signal abnormality in the right medulla and cervicomedullary junction, no new intracranial abnormality. Repeat cervical cord MRI 3/20/2022 shows significant improvement in nonenhancing intramedullary signal abnormality at the upper cervical spinal cord/cervical medullary junction. Patient is reporting some clinical improvement in the numbness on her neck and face. She reports she still has some but this is reducing day by day. Patient is also on Decadron this admission. Repeat LP this admission showed pleocytosis of 13 which is reduced from before. Protein was elevated at 51. Meningitis panel negative, including for VZV this time.         Recommendations:  -Suspect patient's current symptoms to linger on for a bit due to the residual inflammation and immunocompromise status due to the rheumatoid arthritis and being on upadacitinib. Symptoms are anticipated to resolve with time.  -Plan to continue acyclovir 650 mg IV every 8 hours per prior plan by Dr. Melissa Ivory, until 3/31/2022. This will give the patient about 4 weeks of antiviral.  -Patient is to follow-up with neurology as outpatient.  -Patient must also follow Dr. Melissa Ivory as outpatient in 2 to 3 weeks. Post Discharge PICC and Antibiotic Orders     1. Diagnosis: Varicella meningoencephalitis/myelitis  2. Antibiotic: Acyclovir 650 mg IV every 8 hours through 3/31/2022  3. Routine PICC/ Cary/ Portacath Care including PRN catheter flow management  4. Weekly labs:              _x__CBC/diff/platelets              x___BUN/Creatinine              ___CPK              _x__AST/TotalBilirubin/AlkalinePhosphatase  5. Fax lab to [phone]  Call Critical Lab Values to [phone]  6. May send to IR for line evaluation or replacement -[phone]  7. Home Health to pull PIC line at end of therapy or send to IR for Cary removal  8. Allergies:  No Known Allergies            Thank you for the opportunity to participate in the care of this patient. Please contact with questions or concerns.       Aydee Rodriguez MD  Infectious Diseases

## 2022-03-22 NOTE — PROGRESS NOTES
3/22/2022  Case Management Progress Note    2:52 PM  Orders noted and sent to [address], [place]    11:55 AM  Patient is 40year old female admitted 3/18 for meningitis  Patient's RUR is 10% low risk of readmission, however she is a readmission to this hospital  Covid test: negative 3/18   Chart reviewed--patient discussed at IDR rounds  Per infectious disease, plan is for patient to return home and finish her acyclovir through the 31st. [facility] [place] [address] [facility] both [place] and for her nursing care. At this time she does not have any other needs noted at this time, so once orders are placed I will send them [facility] [place] [address] in preparation for discharge. Will continue to follow and assist with discharge planning. Transition of Care Plan   1. Continue medical management/treatment  2. [facility] [employer] and family assist  3. Family will transport at discharge  4. Follow up outpatient as needed  5.  CM will continue to follow     FREDERIC Joy

## 2022-03-23 ENCOUNTER — TELEPHONE (OUTPATIENT)
Dept: FAMILY MEDICINE CLINIC | Age: 37
End: 2022-03-23

## 2022-03-23 LAB
ALBUMIN SERPL-MCNC: 2.8 G/DL (ref 3.5–5)
ALBUMIN/GLOB SERPL: 0.8 {RATIO} (ref 1.1–2.2)
ALP SERPL-CCNC: 38 U/L (ref 45–117)
ALT SERPL-CCNC: 40 U/L (ref 12–78)
ANION GAP SERPL CALC-SCNC: 7 MMOL/L (ref 5–15)
AST SERPL-CCNC: 38 U/L (ref 15–37)
BILIRUB SERPL-MCNC: 0.3 MG/DL (ref 0.2–1)
BUN SERPL-MCNC: 12 MG/DL (ref 6–20)
BUN/CREAT SERPL: 16 (ref 12–20)
CALCIUM SERPL-MCNC: 9.1 MG/DL (ref 8.5–10.1)
CHLORIDE SERPL-SCNC: 105 MMOL/L (ref 97–108)
CO2 SERPL-SCNC: 28 MMOL/L (ref 21–32)
CREAT SERPL-MCNC: 0.77 MG/DL (ref 0.55–1.02)
ERYTHROCYTE [DISTWIDTH] IN BLOOD BY AUTOMATED COUNT: 16.4 % (ref 11.5–14.5)
GLOBULIN SER CALC-MCNC: 3.7 G/DL (ref 2–4)
GLUCOSE SERPL-MCNC: 112 MG/DL (ref 65–100)
HCT VFR BLD AUTO: 28.9 % (ref 35–47)
HGB BLD-MCNC: 9.1 G/DL (ref 11.5–16)
MCH RBC QN AUTO: 27.6 PG (ref 26–34)
MCHC RBC AUTO-ENTMCNC: 31.5 G/DL (ref 30–36.5)
MCV RBC AUTO: 87.6 FL (ref 80–99)
NRBC # BLD: 0.07 K/UL (ref 0–0.01)
NRBC BLD-RTO: 0.5 PER 100 WBC
PLATELET # BLD AUTO: 334 K/UL (ref 150–400)
PMV BLD AUTO: 8.6 FL (ref 8.9–12.9)
POTASSIUM SERPL-SCNC: 4.1 MMOL/L (ref 3.5–5.1)
PROT SERPL-MCNC: 6.5 G/DL (ref 6.4–8.2)
RBC # BLD AUTO: 3.3 M/UL (ref 3.8–5.2)
SODIUM SERPL-SCNC: 140 MMOL/L (ref 136–145)
WBC # BLD AUTO: 13.9 K/UL (ref 3.6–11)

## 2022-03-23 PROCEDURE — 74011000258 HC RX REV CODE- 258: Performed by: EMERGENCY MEDICINE

## 2022-03-23 PROCEDURE — 74011000250 HC RX REV CODE- 250: Performed by: EMERGENCY MEDICINE

## 2022-03-23 PROCEDURE — 74011250636 HC RX REV CODE- 250/636: Performed by: EMERGENCY MEDICINE

## 2022-03-23 PROCEDURE — 74011000250 HC RX REV CODE- 250: Performed by: INTERNAL MEDICINE

## 2022-03-23 PROCEDURE — 36415 COLL VENOUS BLD VENIPUNCTURE: CPT

## 2022-03-23 PROCEDURE — 74011250637 HC RX REV CODE- 250/637: Performed by: INTERNAL MEDICINE

## 2022-03-23 PROCEDURE — 74011250636 HC RX REV CODE- 250/636: Performed by: INTERNAL MEDICINE

## 2022-03-23 PROCEDURE — 77030040361 HC SLV COMPR DVT MDII -B

## 2022-03-23 PROCEDURE — 74011250637 HC RX REV CODE- 250/637: Performed by: PSYCHIATRY & NEUROLOGY

## 2022-03-23 PROCEDURE — 80053 COMPREHEN METABOLIC PANEL: CPT

## 2022-03-23 PROCEDURE — 65270000029 HC RM PRIVATE

## 2022-03-23 PROCEDURE — 85027 COMPLETE CBC AUTOMATED: CPT

## 2022-03-23 RX ORDER — LOPERAMIDE HYDROCHLORIDE 2 MG/1
2 CAPSULE ORAL
Status: DISCONTINUED | OUTPATIENT
Start: 2022-03-23 | End: 2022-03-25 | Stop reason: HOSPADM

## 2022-03-23 RX ORDER — DEXAMETHASONE 4 MG/1
4 TABLET ORAL EVERY 12 HOURS
Status: DISCONTINUED | OUTPATIENT
Start: 2022-03-23 | End: 2022-03-25 | Stop reason: HOSPADM

## 2022-03-23 RX ORDER — ENOXAPARIN SODIUM 100 MG/ML
40 INJECTION SUBCUTANEOUS EVERY 24 HOURS
Status: DISCONTINUED | OUTPATIENT
Start: 2022-03-23 | End: 2022-03-25 | Stop reason: HOSPADM

## 2022-03-23 RX ORDER — METHOCARBAMOL 500 MG/1
500 TABLET, FILM COATED ORAL 4 TIMES DAILY
Status: DISCONTINUED | OUTPATIENT
Start: 2022-03-23 | End: 2022-03-25 | Stop reason: HOSPADM

## 2022-03-23 RX ADMIN — SODIUM CHLORIDE, PRESERVATIVE FREE 10 ML: 5 INJECTION INTRAVENOUS at 21:28

## 2022-03-23 RX ADMIN — DEXAMETHASONE 4 MG: 4 TABLET ORAL at 21:29

## 2022-03-23 RX ADMIN — ACYCLOVIR SODIUM 650 MG: 50 INJECTION, SOLUTION INTRAVENOUS at 21:29

## 2022-03-23 RX ADMIN — DEXAMETHASONE 4 MG: 4 TABLET ORAL at 12:21

## 2022-03-23 RX ADMIN — GABAPENTIN 300 MG: 300 CAPSULE ORAL at 15:20

## 2022-03-23 RX ADMIN — SODIUM CHLORIDE, PRESERVATIVE FREE 10 ML: 5 INJECTION INTRAVENOUS at 06:10

## 2022-03-23 RX ADMIN — DEXAMETHASONE 4 MG: 4 TABLET ORAL at 00:31

## 2022-03-23 RX ADMIN — CILOSTAZOL 100 MG: 100 TABLET ORAL at 09:02

## 2022-03-23 RX ADMIN — METHOCARBAMOL TABLETS 500 MG: 500 TABLET, COATED ORAL at 12:20

## 2022-03-23 RX ADMIN — METHOCARBAMOL TABLETS 500 MG: 500 TABLET, COATED ORAL at 21:29

## 2022-03-23 RX ADMIN — GABAPENTIN 300 MG: 300 CAPSULE ORAL at 09:02

## 2022-03-23 RX ADMIN — GABAPENTIN 300 MG: 300 CAPSULE ORAL at 21:30

## 2022-03-23 RX ADMIN — ASPIRIN 81 MG: 81 TABLET, CHEWABLE ORAL at 09:02

## 2022-03-23 RX ADMIN — SODIUM CHLORIDE, PRESERVATIVE FREE 10 ML: 5 INJECTION INTRAVENOUS at 02:19

## 2022-03-23 RX ADMIN — ACYCLOVIR SODIUM 650 MG: 50 INJECTION, SOLUTION INTRAVENOUS at 06:13

## 2022-03-23 RX ADMIN — METHOCARBAMOL TABLETS 500 MG: 500 TABLET, COATED ORAL at 17:53

## 2022-03-23 RX ADMIN — SODIUM CHLORIDE, PRESERVATIVE FREE 10 ML: 5 INJECTION INTRAVENOUS at 02:18

## 2022-03-23 RX ADMIN — OXYCODONE 10 MG: 5 TABLET ORAL at 15:26

## 2022-03-23 RX ADMIN — SODIUM CHLORIDE, PRESERVATIVE FREE 10 ML: 5 INJECTION INTRAVENOUS at 02:17

## 2022-03-23 RX ADMIN — ONDANSETRON 4 MG: 2 INJECTION INTRAMUSCULAR; INTRAVENOUS at 02:18

## 2022-03-23 RX ADMIN — HYDROMORPHONE HYDROCHLORIDE 1 MG: 1 INJECTION, SOLUTION INTRAMUSCULAR; INTRAVENOUS; SUBCUTANEOUS at 02:18

## 2022-03-23 RX ADMIN — OXYCODONE 10 MG: 5 TABLET ORAL at 08:09

## 2022-03-23 RX ADMIN — SODIUM CHLORIDE, PRESERVATIVE FREE 10 ML: 5 INJECTION INTRAVENOUS at 06:15

## 2022-03-23 RX ADMIN — ACYCLOVIR SODIUM 650 MG: 50 INJECTION, SOLUTION INTRAVENOUS at 15:20

## 2022-03-23 RX ADMIN — DEXAMETHASONE 4 MG: 4 TABLET ORAL at 06:13

## 2022-03-23 NOTE — PROGRESS NOTES
[address] [facility]  Hospitalist Group                                                                                          Hospitalist Progress Note  Nataly Israel MD  Answering service: [phone] from in house phone        Date of Service:  3/23/2022  NAME:  Kenia Victoria  :  1985  MRN:  [id number]      Admission Summary:   \"Ms. Neida Gilbert is a 40 y.o.  female with a past medical history of rheumatoid arthritis previously on upadacitinib, no nodes, tachycardia and recent acute varicella-zoster meningoencephalitis who is admitted with sepsis. Ms. Neida Gilbert was discharged on 2020 from [address] [place] [address] after being admitted for  acute varicella-zoster meningoencephalitis. During her hospital stay, she was treated with steroids and started on acyclovir. She was discharged with a PICC line and has completed 16 days of therapy. Today she followed up with infectious disease outpatient who sent her to the emergency room due to concern for fevers. Patient states she continues to have numbness, burning and tenderness of her neck and right upper extremity. She states the pain pressure in her neck right shoulder slightly improved. She also endorses some shortness of breath. She has some pain of her joints. She states that she has been experiencing some fevers at home. \"    Interval history / Subjective:   3/20/2022. Saw patient this morning, awake alert and oriented, no apparent distress, pleasant and cooperative with physical examination, still complaining of right-sided head and neck stiffness and subjective swelling otherwise denies any fever, chills, visual changes, numbness, tingling, weakness, bowel or bladder issues. Has been afebrile for the last 24 hours. 3/23/2022. No acute events overnight. Saw patient this morning, awake alert and oriented, pleasant and cooperative with physical examination.   Still complaining of left-sided neck pain and stiffness and numbness of the left scalp area. Still having headache however denies any vision changes, weakness, shortness of breath, nausea, vomiting, diarrhea, constipation or any urinary symptoms. Assessment & Plan:     Fever:   P/w leukocytosis tachycardia but no obvious suspected source. WBC and fever improved with abx, but no clear source ever identified. No e/o pneumonia, UTI, cellulitis. LP reassuring and receiving tx for VZV. No obvious skin lesions. BCx NGTD. Consider RA related, but she has no active synovitis to suggest flare. She endorses unilateral head/neck pain, burning pressure so GCA a consideration, but less likely. Does have a trigeminal distribution, so potentially related to central lesion as known. ESR fairly elevated. Did start steroids. Procal low today, will dc abx and monitor over night              - Cont acyclovir              - stop abx              - F/u blood cultures, propionobacterium can take up to 5 days              - Check  JOJO, ANCA              - Dexamethasone x 3 days              - Repeat brain, cervical spine MRI imrpoved     Sinus tachycardia:  Echo normal. CTA neg for PE. Does have pain, potentially untreated. Euvolemic and infx controlled, if present at all. Improved today following steroids and increase in gabapentin              - Optimize volume, pain              - May consider FREDDIE at dc     Acute myelitis / Acute Varicella Zoster Meningoencephalitis:   Infection due to immunocompromised state. S/p LP during prior hospitalization and repeat LP 3/18 in ER reassuring.  Aquaporin Ab neg. - Acyclovir              - Repeat imaging improved              - Neuro following, appreciate recs     RUE Pain and paresthesias: Likely due to myelitis.  Completed high dose IV steroids on prior admission.  Dopplers neg              - Dex     Hyperkalemia/ hyponatremia: Suspect due to intravascular volume depletion.      Rheumatoid arthritis/ Chronic immunosupppression: Last dose of upadacitinib early March due to varicella zoster infection. No active synovitis on exam     Raynaud's: Continue nifedipine, pletal; hold ccb with hypotension                                                             Code status: Full code  DVT prophylaxis: Lovenox    Care Plan discussed with: Patient/Family  Anticipated Disposition: Home w/Family  Anticipated Discharge: Less than 24 hours     Hospital Problems  Never Reviewed          Codes Class Noted POA    Meningitis ICD-10-CM: G03.9  ICD-9-CM: 322.9  3/18/2022 Unknown        Sepsis ([facility] ICD-10-CM: A41.9  ICD-9-CM: 038.9, 995.91  3/18/2022 Unknown                Review of Systems:   A comprehensive review of systems was negative except for that written in the HPI. Vital Signs:    Last 24hrs VS reviewed since prior progress note. Most recent are:  Visit Vitals  /77 (BP 1 Location: Left upper arm, BP Patient Position: At rest)   Pulse 90   Temp 98.3 °F (36.8 °C)   Resp 16   Ht 5' 4\" (1.626 m)   Wt 70.6 kg (155 lb 11.2 oz)   SpO2 97%   BMI 26.73 kg/m²         Intake/Output Summary (Last 24 hours) at 3/23/2022 1525  Last data filed at 3/23/2022 0809  Gross per 24 hour   Intake 520 ml   Output 600 ml   Net -80 ml        Physical Examination:     I had a face to face encounter with this patient and independently examined them on 3/23/2022 as outlined below:          Constitutional:  No acute distress, cooperative, pleasant    ENT:  Oral mucosa moist, oropharynx benign. Resp:  CTA bilaterally. No wheezing/rhonchi/rales. No accessory muscle use   CV:  Regular rhythm, normal rate, no murmurs, gallops, rubs    GI:  Soft, non distended, non tender. normoactive bowel sounds, no hepatosplenomegaly     Musculoskeletal:  No edema, warm, 2+ pulses throughout    Neurologic:  Moves all extremities.   AAOx3, CN II-XII reviewed            Data Review:    Review and/or order of clinical lab test      Labs:     Recent Labs     03/23/22  0611 03/21/22  0329   WBC 13.9* 6. 2   HGB 9.1* 9.8*   HCT 28.9* 30.9*    265     Recent Labs     03/23/22  0611 03/21/22  0329     --    K 4.1  --      --    CO2 28  --    BUN 12  --    CREA 0.77 0.71   *  --    CA 9.1  --      Recent Labs     03/23/22  0611   ALT 40   AP 38*   TBILI 0.3   TP 6.5   ALB 2.8*   GLOB 3.7     No results for input(s): INR, PTP, APTT, INREXT, INREXT in the last 72 hours. No results for input(s): FE, TIBC, PSAT, FERR in the last 72 hours. No results found for: FOL, RBCF   No results for input(s): PH, PCO2, PO2 in the last 72 hours. No results for input(s): CPK, CKNDX, TROIQ in the last 72 hours.     No lab exists for component: CPKMB  Lab Results   Component Value Date/Time    Cholesterol, total 159 03/04/2022 03:10 AM    HDL Cholesterol 38 03/04/2022 03:10 AM    LDL, calculated 79.8 03/04/2022 03:10 AM    Triglyceride 206 (H) 03/04/2022 03:10 AM    CHOL/HDL Ratio 4.2 03/04/2022 03:10 AM     Lab Results   Component Value Date/Time    Glucose (POC) 73 03/03/2022 05:40 PM    Glucose (POC) 63 (L) 03/03/2022 05:06 PM    Glucose (POC) 43 (LL) 03/03/2022 04:08 PM    Glucose (POC) 49 (LL) 03/03/2022 04:05 PM     Lab Results   Component Value Date/Time    Color YELLOW/STRAW 03/18/2022 04:23 PM    Appearance CLOUDY (A) 03/18/2022 04:23 PM    Specific gravity 1.012 03/18/2022 04:23 PM    pH (UA) 6.0 03/18/2022 04:23 PM    Protein Negative 03/18/2022 04:23 PM    Glucose Negative 03/18/2022 04:23 PM    Ketone Negative 03/18/2022 04:23 PM    Bilirubin Negative 03/18/2022 04:23 PM    Urobilinogen 0.2 03/18/2022 04:23 PM    Nitrites Negative 03/18/2022 04:23 PM    Leukocyte Esterase TRACE (A) 03/18/2022 04:23 PM    Epithelial cells MANY (A) 03/18/2022 04:23 PM    Bacteria Negative 03/18/2022 04:23 PM    WBC 5-10 03/18/2022 04:23 PM    RBC 0-5 03/18/2022 04:23 PM         Medications Reviewed:     Current Facility-Administered Medications   Medication Dose Route Frequency    methocarbamoL (ROBAXIN) tablet 500 mg  500 mg Oral QID    enoxaparin (LOVENOX) injection 40 mg  40 mg SubCUTAneous Q24H    dexAMETHasone (DECADRON) tablet 4 mg  4 mg Oral Q6H    gabapentin (NEURONTIN) capsule 300 mg  300 mg Oral TID    sodium chloride (NS) flush 5-10 mL  5-10 mL IntraVENous PRN    acyclovir (ZOVIRAX) 650 mg in 0.9% sodium chloride 100 mL IVPB  10 mg/kg IntraVENous Q8H    sodium chloride (NS) flush 5-40 mL  5-40 mL IntraVENous Q8H    sodium chloride (NS) flush 5-40 mL  5-40 mL IntraVENous PRN    acetaminophen (TYLENOL) tablet 650 mg  650 mg Oral Q6H PRN    Or    acetaminophen (TYLENOL) suppository 650 mg  650 mg Rectal Q6H PRN    polyethylene glycol (MIRALAX) packet 17 g  17 g Oral DAILY PRN    ondansetron (ZOFRAN ODT) tablet 4 mg  4 mg Oral Q8H PRN    Or    ondansetron (ZOFRAN) injection 4 mg  4 mg IntraVENous Q6H PRN    oxyCODONE IR (ROXICODONE) tablet 10 mg  10 mg Oral Q6H PRN    HYDROmorphone (DILAUDID) injection 1 mg  1 mg IntraVENous Q6H PRN    aspirin chewable tablet 81 mg  81 mg Oral DAILY    cilostazoL (PLETAL) tablet 100 mg  100 mg Oral DAILY    lidocaine (XYLOCAINE) 4 % cream   Topical BID PRN    [Held by provider] NIFEdipine ER (PROCARDIA XL) tablet 30 mg  30 mg Oral DAILY    polyethylene glycol (MIRALAX) packet 17 g  17 g Oral DAILY     ______________________________________________________________________  EXPECTED LENGTH OF STAY: 3d 12h  ACTUAL LENGTH OF STAY:          5                 Mahi Navarro MD

## 2022-03-23 NOTE — PROGRESS NOTES
Urgent perfectserve message just now sent to Dr. Sujatha Parrish, [place] [address] hospitalist, of copy of this author's 0482 nursing progress notes, prefaced by: Vital signs stable, afebrile, and wnl. What to do about several watery stools? Awaiting call back from Dr. Sujatha Parrish at this time. Late entry for 0635: small dark brown/slight green splatter of stool; difficult to determine if watery, not runny. Stool in edge of collection hat in bathroom toilet.

## 2022-03-23 NOTE — PROGRESS NOTES
1200  Bedside and Verbal shift change report given to Jaz Lopez RN (oncoming nurse) by Elba Cruz RN (offgoing nurse).  Report included the following information SBAR, Kardex, Intake/Output, MAR, Recent Results and Med Rec Status.

## 2022-03-23 NOTE — PROGRESS NOTES
The stool patient had during this 0600 nursing rounds, seen by this author, was not watery. Patient subsequently reported that stools are becoming less, \"watery. \" This stool is semiformed/not watery. Day shift nurse has been shown the stool as it remained in bathroom toilet collection hat for 0700 shift change bedside report. BEDSIDE_VERBAL_RECORDED_WRITTEN:[id number]::\"Bedside\"} shift change report given to [facility]  (oncoming nurse) by Jazmin King (offgoing nurse). Report included the following information SBAR, Kardex, ED Summary, Procedure Summary, Intake/Output, MAR, Recent Results and Med Rec Status. Stool issue. Aura Graves

## 2022-03-23 NOTE — PROGRESS NOTES
3/23/2022  Case Management Progress Note    11:38 AM  Patient is 40year old female admitted 3/18 for meningitis  Patient's RUR is 13% low risk of readmission, however she is a readmission to this hospital  Covid test: negative 3/18   Chart reviewed--patient discussed at IDR rounds  Per rounds patient may be ready for discharge today as she has been fever free so far. Orders for IV acyclovir were sent to [facility] yesterday and I have informed them that she will likely discharge today. They are also doing her PICC care. No other noted needs at this time, plan otherwise is for patient to return home with family assistance. Will continue to follow and assist with discharge planning as needed. Transition of Care Plan   1. Continue medical management/treatment  2. Maybe home today   3. IV orders sent to [facility], who were seeing pt prior to admission  4. Family will transport at discharge  5. Follow up outpatient with PCP, specialties as needed  6.  CM will continue to follow    FREDERIC Herrera

## 2022-03-23 NOTE — PROGRESS NOTES
Patient reported having had several watery stools over the past day since early morning on 3/22/2022. On antibiotics, patient requested immodium (no orders for same), and stated patient's mother told patient to consume bananas. Applesauce given as indicated, and patient stated having consumed applesauce earlier in the evening 3/22/2022. At this time, patient is in bathroom. Hats provided, one for stool, the other for urine. Patient also request probiotic at this time. If patient is candidate for c diff stool specimen, stool specimen can be collected as indicated. Patient denied having dizziness, nausea, or trouble breathing. Stated has no trouble urinating. 3961 labs drawn for this morning, from [facility] single lumen sal [facility] PICC.

## 2022-03-24 PROBLEM — A41.9 SEPSIS (HCC): Status: ACTIVE | Noted: 2022-03-18

## 2022-03-24 PROBLEM — G03.9 MENINGITIS: Status: ACTIVE | Noted: 2022-03-18

## 2022-03-24 LAB
ALBUMIN SERPL-MCNC: 2.8 G/DL (ref 3.5–5)
ALBUMIN/GLOB SERPL: 0.8 {RATIO} (ref 1.1–2.2)
ALP SERPL-CCNC: 37 U/L (ref 45–117)
ALT SERPL-CCNC: 45 U/L (ref 12–78)
ANION GAP SERPL CALC-SCNC: 4 MMOL/L (ref 5–15)
AST SERPL-CCNC: 40 U/L (ref 15–37)
BACTERIA SPEC CULT: NORMAL
BACTERIA SPEC CULT: NORMAL
BILIRUB SERPL-MCNC: 0.5 MG/DL (ref 0.2–1)
BUN SERPL-MCNC: 12 MG/DL (ref 6–20)
BUN/CREAT SERPL: 13 (ref 12–20)
CALCIUM SERPL-MCNC: 8.6 MG/DL (ref 8.5–10.1)
CHLORIDE SERPL-SCNC: 104 MMOL/L (ref 97–108)
CO2 SERPL-SCNC: 28 MMOL/L (ref 21–32)
CREAT SERPL-MCNC: 0.9 MG/DL (ref 0.55–1.02)
ERYTHROCYTE [DISTWIDTH] IN BLOOD BY AUTOMATED COUNT: 16.7 % (ref 11.5–14.5)
GLOBULIN SER CALC-MCNC: 3.6 G/DL (ref 2–4)
GLUCOSE SERPL-MCNC: 113 MG/DL (ref 65–100)
HCT VFR BLD AUTO: 29.1 % (ref 35–47)
HGB BLD-MCNC: 9 G/DL (ref 11.5–16)
MCH RBC QN AUTO: 27.2 PG (ref 26–34)
MCHC RBC AUTO-ENTMCNC: 30.9 G/DL (ref 30–36.5)
MCV RBC AUTO: 87.9 FL (ref 80–99)
NRBC # BLD: 0.11 K/UL (ref 0–0.01)
NRBC BLD-RTO: 0.7 PER 100 WBC
PLATELET # BLD AUTO: 359 K/UL (ref 150–400)
PMV BLD AUTO: 8.5 FL (ref 8.9–12.9)
POTASSIUM SERPL-SCNC: 3.9 MMOL/L (ref 3.5–5.1)
PROT SERPL-MCNC: 6.4 G/DL (ref 6.4–8.2)
RBC # BLD AUTO: 3.31 M/UL (ref 3.8–5.2)
SERVICE CMNT-IMP: NORMAL
SERVICE CMNT-IMP: NORMAL
SODIUM SERPL-SCNC: 136 MMOL/L (ref 136–145)
WBC # BLD AUTO: 15.5 K/UL (ref 3.6–11)

## 2022-03-24 PROCEDURE — 74011250636 HC RX REV CODE- 250/636: Performed by: INTERNAL MEDICINE

## 2022-03-24 PROCEDURE — 74011250636 HC RX REV CODE- 250/636: Performed by: EMERGENCY MEDICINE

## 2022-03-24 PROCEDURE — 74011000250 HC RX REV CODE- 250: Performed by: INTERNAL MEDICINE

## 2022-03-24 PROCEDURE — 99232 SBSQ HOSP IP/OBS MODERATE 35: CPT | Performed by: PSYCHIATRY & NEUROLOGY

## 2022-03-24 PROCEDURE — 74011000258 HC RX REV CODE- 258: Performed by: EMERGENCY MEDICINE

## 2022-03-24 PROCEDURE — 74011250637 HC RX REV CODE- 250/637: Performed by: PSYCHIATRY & NEUROLOGY

## 2022-03-24 PROCEDURE — 74011250637 HC RX REV CODE- 250/637: Performed by: INTERNAL MEDICINE

## 2022-03-24 PROCEDURE — 36415 COLL VENOUS BLD VENIPUNCTURE: CPT

## 2022-03-24 PROCEDURE — 80053 COMPREHEN METABOLIC PANEL: CPT

## 2022-03-24 PROCEDURE — 89055 LEUKOCYTE ASSESSMENT FECAL: CPT

## 2022-03-24 PROCEDURE — 85027 COMPLETE CBC AUTOMATED: CPT

## 2022-03-24 PROCEDURE — 65270000029 HC RM PRIVATE

## 2022-03-24 RX ORDER — GABAPENTIN 300 MG/1
600 CAPSULE ORAL 3 TIMES DAILY
Status: DISCONTINUED | OUTPATIENT
Start: 2022-03-24 | End: 2022-03-25 | Stop reason: HOSPADM

## 2022-03-24 RX ADMIN — GABAPENTIN 300 MG: 300 CAPSULE ORAL at 08:16

## 2022-03-24 RX ADMIN — SODIUM CHLORIDE, PRESERVATIVE FREE 10 ML: 5 INJECTION INTRAVENOUS at 14:55

## 2022-03-24 RX ADMIN — OXYCODONE 10 MG: 5 TABLET ORAL at 00:31

## 2022-03-24 RX ADMIN — DEXAMETHASONE 4 MG: 4 TABLET ORAL at 21:00

## 2022-03-24 RX ADMIN — METHOCARBAMOL TABLETS 500 MG: 500 TABLET, COATED ORAL at 08:16

## 2022-03-24 RX ADMIN — OXYCODONE 10 MG: 5 TABLET ORAL at 21:00

## 2022-03-24 RX ADMIN — METHOCARBAMOL TABLETS 500 MG: 500 TABLET, COATED ORAL at 12:24

## 2022-03-24 RX ADMIN — ACYCLOVIR SODIUM 650 MG: 50 INJECTION, SOLUTION INTRAVENOUS at 21:12

## 2022-03-24 RX ADMIN — METHOCARBAMOL TABLETS 500 MG: 500 TABLET, COATED ORAL at 21:00

## 2022-03-24 RX ADMIN — CILOSTAZOL 100 MG: 100 TABLET ORAL at 08:16

## 2022-03-24 RX ADMIN — METHOCARBAMOL TABLETS 500 MG: 500 TABLET, COATED ORAL at 18:39

## 2022-03-24 RX ADMIN — GABAPENTIN 600 MG: 300 CAPSULE ORAL at 16:06

## 2022-03-24 RX ADMIN — OXYCODONE 10 MG: 5 TABLET ORAL at 10:42

## 2022-03-24 RX ADMIN — ACYCLOVIR SODIUM 650 MG: 50 INJECTION, SOLUTION INTRAVENOUS at 05:38

## 2022-03-24 RX ADMIN — LOPERAMIDE HYDROCHLORIDE 2 MG: 2 CAPSULE ORAL at 00:30

## 2022-03-24 RX ADMIN — ASPIRIN 81 MG: 81 TABLET, CHEWABLE ORAL at 08:16

## 2022-03-24 RX ADMIN — DEXAMETHASONE 4 MG: 4 TABLET ORAL at 08:16

## 2022-03-24 RX ADMIN — ACYCLOVIR SODIUM 650 MG: 50 INJECTION, SOLUTION INTRAVENOUS at 14:55

## 2022-03-24 RX ADMIN — SODIUM CHLORIDE, PRESERVATIVE FREE 10 ML: 5 INJECTION INTRAVENOUS at 00:25

## 2022-03-24 RX ADMIN — SODIUM CHLORIDE, PRESERVATIVE FREE 10 ML: 5 INJECTION INTRAVENOUS at 05:38

## 2022-03-24 RX ADMIN — SODIUM CHLORIDE, PRESERVATIVE FREE 10 ML: 5 INJECTION INTRAVENOUS at 21:01

## 2022-03-24 RX ADMIN — GABAPENTIN 600 MG: 300 CAPSULE ORAL at 21:00

## 2022-03-24 NOTE — PROGRESS NOTES
Neurology - Inpatient Progress Note     Name:   Lakisha Ortega  MRN:    [id number]  [facility] Date:   3/18/2022    Follow up:   Recent VZV Meningoencephalitis and Myelitis, neuropathic pain  sided paresthesias (right side head, neck, and down right arm)    Interval History     Pt awake, resting in bed, appears to be perspiring  Dr Darlyn Franz Hospitalist in room talking with patient    Pt reports she continues to have the right sided paresthesias  Asked her if she would like to increase Gabapentin dose and she says yes    Brief ROS: As noted above         No Known Allergies    Current Facility-Administered Medications:     methocarbamoL (ROBAXIN) tablet 500 mg, 500 mg, Oral, QID, Rayshawn Dong MD, 500 mg at 03/24/22 0816    enoxaparin (LOVENOX) injection 40 mg, 40 mg, SubCUTAneous, Q24H, Rayshawn Dong MD    dexAMETHasone (DECADRON) tablet 4 mg, 4 mg, Oral, Q12H, Rayshawn Dong MD, 4 mg at 03/24/22 0816    loperamide (IMODIUM) capsule 2 mg, 2 mg, Oral, Q8H PRN, Rayshawn Wright MD, 2 mg at 03/24/22 0030    gabapentin (NEURONTIN) capsule 300 mg, 300 mg, Oral, TID, Rena Whiteside MD, 300 mg at 03/24/22 0816    sodium chloride (NS) flush 5-10 mL, 5-10 mL, IntraVENous, PRN, James Jade MD, 10 mL at 03/23/22 0218    acyclovir (ZOVIRAX) 650 mg in 0.9% sodium chloride 100 mL IVPB, 10 mg/kg, IntraVENous, Q8H, James Jade MD, Last Rate: 100 mL/hr at 03/24/22 0538, 650 mg at 03/24/22 0538    sodium chloride (NS) flush 5-40 mL, 5-40 mL, IntraVENous, Q8H, Randolph, Rebecca, DO, 10 mL at 03/24/22 0538    sodium chloride (NS) flush 5-40 mL, 5-40 mL, IntraVENous, PRN, Randolph, Rebecca, DO, 10 mL at 03/24/22 0025    acetaminophen (TYLENOL) tablet 650 mg, 650 mg, Oral, Q6H PRN, 650 mg at 03/19/22 9165 **OR** acetaminophen (TYLENOL) suppository 650 mg, 650 mg, Rectal, Q6H PRN, Randolph, Rebecca, DO    polyethylene glycol (MIRALAX) packet 17 g, 17 g, Oral, DAILY PRN, Randolph, Rebecca, DO    ondansetron (ZOFRAN ODT) tablet 4 mg, 4 mg, Oral, Q8H PRN **OR** ondansetron (ZOFRAN) injection 4 mg, 4 mg, IntraVENous, Q6H PRN, RandolphMarc Rides, DO, 4 mg at 03/23/22 0218    oxyCODONE IR (ROXICODONE) tablet 10 mg, 10 mg, Oral, Q6H PRN, Chel Poster, DO, 10 mg at 03/24/22 0031    HYDROmorphone (DILAUDID) injection 1 mg, 1 mg, IntraVENous, Q6H PRN, Randolph, Rebecca, DO, 1 mg at 03/23/22 0218    aspirin chewable tablet 81 mg, 81 mg, Oral, DAILY, Chel Poster, DO, 81 mg at 03/24/22 0816    cilostazoL (PLETAL) tablet 100 mg, 100 mg, Oral, DAILY, Randolph, Rebecca, DO, 100 mg at 03/24/22 0816    lidocaine (XYLOCAINE) 4 % cream, , Topical, BID PRN, Randolph, Rebecca, DO    [Held by provider] NIFEdipine ER (PROCARDIA XL) tablet 30 mg, 30 mg, Oral, DAILY, Randolph, Rebecca, DO    polyethylene glycol (MIRALAX) packet 17 g, 17 g, Oral, DAILY, Randolph, Rebecca, DO, 17 g at 03/22/22 0915      PMHx: has a past medical history of History of vascular access device (03/08/2022), Raynaud's disease, and [facility] PSHx: has a past surgical history that includes hx gyn. Impression / Plan       ICD-10-CM ICD-9-CM   1. Sepsis, due to unspecified organism, unspecified whether acute organ dysfunction present ([facility] [address]  A41.9 038.9     995.91   2. Varicella encephalitis  B01.11 052.0   3. Fever and chills  R50.9 780.60   4. Leukocytosis, unspecified type  D72.829 288.60   5. Tachycardia  R00.0 785.0   6. Acute viral transverse myelitis (HCC)  G37.3 341.20    B34.9 079.99   7. Neuropathic pain  M79.2 729.2   8. Neuropathic pain, arm  M79.2 723.4   9. Right facial numbness  R20.0 782.0   10. 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      Increased Gabapentin to 600 mg TID     Follow up with Dr Suraj Strickland in Neurology Clinic after discharge (that information is entered into d/c section)            Signed By: Carolina Aparicio MD     March 24, 2022

## 2022-03-24 NOTE — PROGRESS NOTES
Comprehensive Nutrition Assessment    Type and Reason for Visit: Initial,RD nutrition re-screen/LOS    Nutrition Recommendations/Plan:   1. Continue regular diet  2. Modify supplement to BID - Provide Ensure Enlive BID to increase kcal/protein intake (700 kcal, 88 g Carbs, 40 g protein)    Nutrition Assessment:    Pt is a 40year old female admitted with Meningitis [G03.9]. She  has a past medical history of History of vascular access device (03/08/2022), Raynaud's disease, and Rheumatoid arthritis ([facility] [address] Patient states #, with no recent weight or appetite changes. Per documentation, patient had lost 7# (4.7%) x four days, but weights appear stated. No N/V/D. Denies chewing/swallowing problems. NKFA. RN had placed order for Ensure Enlive TID  (1050 kcal, 132 g carbs, 60 g P) to aid in meeting kcal/protein needs. Patient easily meeting kcal needs with  current meal intake - RD to modify supplement to BID. Per discussion with MD, patient may discharge today. Patient Vitals for the past 168 hrs:   % Diet Eaten   03/23/22 0809 51 - 75%   03/21/22 1800 76 - 100%   03/21/22 1230 76 - 100%   03/21/22 0830 76 - 100%   03/20/22 0930 76 - 100%   03/19/22 1146 26 - 50%       Wt Readings from Last 10 Encounters:   03/23/22 65.4 kg (144 lb 2.9 oz)   03/18/22 63.5 kg (140 lb)   03/14/22 66.7 kg (147 lb)   03/03/22 66.7 kg (147 lb)       Malnutrition Assessment:  Malnutrition Status:  No malnutrition    Context:  Acute illness     Findings of the 6 clinical characteristics of malnutrition:   Energy Intake:  No significant decrease in energy intake  Weight Loss:  1 - 1% to 2% over 1 week   - but weights appear stated  Body Fat Loss:  No significant body fat loss,     Muscle Mass Loss:  No significant muscle mass loss,    Fluid Accumulation:  No significant fluid accumulation,     Strength:  Not performed     Estimated Daily Nutrient Needs:  Energy (kcal): 1721 (MSJ x 1.3);  Weight Used for Energy Requirements: Current  Protein (g): 52-65 (0.8-1.0); Weight Used for Protein Requirements: Current  Fluid (ml/day): 1721; Method Used for Fluid Requirements: 1 ml/kcal    Nutrition Related Findings:       ABD: WDL  Last BM: 03/24/22, Formed  Edema:No data recorded    Nutr. Labs:    Lab Results   Component Value Date/Time    GFR est AA >60 03/24/2022 02:05 AM    GFR est non-AA >60 03/24/2022 02:05 AM    Creatinine 0.90 03/24/2022 02:05 AM    BUN 12 03/24/2022 02:05 AM    Sodium 136 03/24/2022 02:05 AM    Potassium 3.9 03/24/2022 02:05 AM    Chloride 104 03/24/2022 02:05 AM    CO2 28 03/24/2022 02:05 AM       Lab Results   Component Value Date/Time    Glucose 113 (H) 03/24/2022 02:05 AM    Glucose (POC) 73 03/03/2022 05:40 PM       Lab Results   Component Value Date/Time    Hemoglobin A1c 5.5 03/04/2022 03:10 AM       Nutr. Meds:  Decadron, imodium, zofran PRN, miralax     Wounds:    None       Current Nutrition Therapies:  ADULT DIET Regular  ADULT ORAL NUTRITION SUPPLEMENT Breakfast, Lunch, Dinner; Standard High Calorie/High Protein    Anthropometric Measures:  · Height:  5' 4\" (162.6 cm)  · Current Body Wt:  65.4 kg (144 lb 2.9 oz)   · Admission Body Wt:  140 lb (stated)    · Usual Body Wt:        · Ideal Body Wt:  120 lbs:  120.2 %   Body mass index is 24.75 kg/m². · BMI Category:  Normal weight (BMI 18.5-24. 9)       Nutrition Diagnosis:   No nutrition diagnosis at this time     Nutrition Interventions:   Food and/or Nutrient Delivery: Continue current diet,Modify oral nutrition supplement  Nutrition Education and Counseling: No recommendations at this time  Coordination of Nutrition Care: Continue to monitor while inpatient,Interdisciplinary rounds    Goals:  PO intake >/= 75% of all meals and supplements within 5 - 7 days       Nutrition Monitoring and Evaluation:   Behavioral-Environmental Outcomes: None identified  Food/Nutrient Intake Outcomes: Food and nutrient intake,Supplement intake  Physical Signs/Symptoms Outcomes: Biochemical data,Skin,Weight    Discharge Planning:    No discharge needs at this time     Electronically signed by Beverly Epley, RD on 1/27/7621   Contact: [phone] or via [employer]

## 2022-03-24 NOTE — PROGRESS NOTES
3/24/2022  Case Management Progress Note    11:41 AM  Patient is 40year old female admitted 3/18 for meningitis  Patient's RUR is 13% low risk of readmission, however she is a readmission to this hospital  Covid test: negative 3/18   Chart reviewed--patient discussed at IDR rounds   Per rounds this morning patient is ready for discharge today pending stool study (as she was having some diarrhea). All of her IV resources are set up for home with [facility], orders were already sent and they are also covering her PICC care. No other needs at this time, will continue to follow and assist with discharge planning. Transition of Care Plan   1. Continue medical management/treatment  2. [facility] set up for infusion/picc care after discharge   3. Home with family otherwise   4. Family will transport at discharge   5.  Cm will continue to follow and assist    FREDERIC William

## 2022-03-24 NOTE — PROGRESS NOTES
Spiritual Care Assessment/Progress Note  [address]      NAME: Albina Anderson      MRN: [id number]  AGE: 40 y.o.  SEX: female  Gnosticist Affiliation: Jehovah witness   Language: English     3/24/2022     Total Time (in minutes): 10     Spiritual Assessment begun in [facility] 5M1 MED SURG 1 through conversation with:         [x]Patient        [] Family    [] Friend(s)        Reason for Consult: Initial/Spiritual assessment, patient floor     Spiritual beliefs: (Please include comment if needed)     [x] Identifies with a cristela tradition:   Jth Witness       [] Supported by a cristela community:            [] Claims no spiritual orientation:           [] Seeking spiritual identity:                [] Adheres to an individual form of spirituality:           [] Not able to assess:                           Identified resources for coping:      [] Prayer                               [] Music                  [] Guided Imagery     [] Family/friends                 [] Pet visits     [] Devotional reading                         [x] Unknown     [] Other:                                             Interventions offered during this visit: (See comments for more details)    Patient Interventions: Affirmation of emotions/emotional suffering,Affirmation of cristela,Normalization of emotional/spiritual concerns,Spiritual care volunteer support           Plan of Care:     [] Support spiritual and/or cultural needs    [] Support AMD and/or advance care planning process      [] Support grieving process   [] Coordinate Rites and/or Rituals    [] Coordination with community clergy   [] No spiritual needs identified at this time   [] Detailed Plan of Care below (See Comments)  [] Make referral to Music Therapy  [] Make referral to Pet Therapy     [] Make referral to Addiction services  [] Make referral to [facility]  [] Make referral to Spiritual Care Partner  [] No future visits requested        [x] Contact Spiritual Care for further referrals     Comments:   Spiritual care assessment with Mrs. Atif Levin on 0I8 Med Surg unit.  was accompanied by [facility] Partner/volunteer, Tamara. Pt immediately expressed that she is a Jehovah Witness and that she has no needs from us at this time, and politely thanked us for stopping by.    Chaplain Phyllis Gregorio M.Div.  Olivia Samson ([id number])

## 2022-03-24 NOTE — PROGRESS NOTES
[facility]  Hospitalist Group                                                                                          Hospitalist Progress Note  Marc Flor MD  Answering service: [phone] from in house phone        Date of Service:  3/24/2022  NAME:  Alexandra Espinoza  :  1985  MRN:  [id number]      Admission Summary:   \"Ms. Tony Marx is a 40 y.o.  female with a past medical history of rheumatoid arthritis previously on upadacitinib, no nodes, tachycardia and recent acute varicella-zoster meningoencephalitis who is admitted with sepsis. Ms. Tony Marx was discharged on 2020 from [facility] after being admitted for  acute varicella-zoster meningoencephalitis. During her hospital stay, she was treated with steroids and started on acyclovir. She was discharged with a PICC line and has completed 16 days of therapy. Today she followed up with infectious disease outpatient who sent her to the emergency room due to concern for fevers. Patient states she continues to have numbness, burning and tenderness of her neck and right upper extremity. She states the pain pressure in her neck right shoulder slightly improved. She also endorses some shortness of breath. She has some pain of her joints. She states that she has been experiencing some fevers at home. \"    Interval history / Subjective:   3/20/2022. Saw patient this morning, awake alert and oriented, no apparent distress, pleasant and cooperative with physical examination, still complaining of right-sided head and neck stiffness and subjective swelling otherwise denies any fever, chills, visual changes, numbness, tingling, weakness, bowel or bladder issues. Has been afebrile for the last 24 hours. 3/23/2022. No acute events overnight. Saw patient this morning, awake alert and oriented, pleasant and cooperative with physical examination.   Still complaining of left-sided neck pain and stiffness and numbness of the left scalp area. Still having headache however denies any vision changes, weakness, shortness of breath, nausea, vomiting, diarrhea, constipation or any urinary symptoms. 3/24/2022. No acute events overnight. Saw patient this morning, awake alert and oriented, pleasant and cooperative with physical examination. Still complaining of right-sided headache neck stiffness but much improved compared to yesterday since starting on Robaxin, patient is complaining of watery diarrhea otherwise denies any fever, chills, nausea, vomiting. Assessment & Plan:     Fever:   P/w leukocytosis tachycardia but no obvious suspected source. WBC and fever improved with abx, but no clear source ever identified. No e/o pneumonia, UTI, cellulitis. LP reassuring and receiving tx for VZV. No obvious skin lesions. BCx NGTD. Consider RA related, but she has no active synovitis to suggest flare. She endorses unilateral head/neck pain, burning pressure so GCA a consideration, but less likely. Does have a trigeminal distribution, so potentially related to central lesion as known. ESR fairly elevated. Did start steroids. Procal low today, will dc abx and monitor over night              - Cont acyclovir              - stop abx              - F/u blood cultures, propionobacterium can take up to 5 days              - Check  JOJO, ANCA              - Dexamethasone x 3 days              - Repeat brain, cervical spine MRI imrpoved     Sinus tachycardia:  Echo normal. CTA neg for PE. Does have pain, potentially untreated. Euvolemic and infx controlled, if present at all. Improved today following steroids and increase in gabapentin              - Optimize volume, pain              - May consider FREDDIE at dc     Acute myelitis / Acute Varicella Zoster Meningoencephalitis:   Infection due to immunocompromised state. S/p LP during prior hospitalization and repeat LP 3/18 in ER reassuring.  Aquaporin Ab neg.               - Acyclovir              - Repeat imaging improved              - Neuro following, appreciate recs     RUE Pain and paresthesias: Likely due to myelitis.  Completed high dose IV steroids on prior admission.  Dopplers neg              - Dex     Hyperkalemia/ hyponatremia: Suspect due to intravascular volume depletion. Rheumatoid arthritis/ Chronic immunosupppression: Last dose of upadacitinib early March due to varicella zoster infection. No active synovitis on exam     Raynaud's: Continue nifedipine, pletal; hold ccb with hypotension                                                             Code status: Full code  DVT prophylaxis: Lovenox    Care Plan discussed with: Patient/Family  Anticipated Disposition: Home w/Family  Anticipated Discharge: Less than 24 hours     Hospital Problems  Never Reviewed          Codes Class Noted POA    Meningitis ICD-10-CM: G03.9  ICD-9-CM: 322.9  3/18/2022 Unknown        Sepsis ([facility] [address] ICD-10-CM: A41.9  ICD-9-CM: 038.9, 995.91  3/18/2022 Unknown                Review of Systems:   A comprehensive review of systems was negative except for that written in the HPI. Vital Signs:    Last 24hrs VS reviewed since prior progress note. Most recent are:  Visit Vitals  /87 (BP 1 Location: Left upper arm, BP Patient Position: At rest)   Pulse 97   Temp 98.5 °F (36.9 °C)   Resp 16   Ht 5' 4\" (1.626 m)   Wt 65.4 kg (144 lb 2.9 oz)   SpO2 98%   BMI 24.75 kg/m²         Intake/Output Summary (Last 24 hours) at 3/24/2022 1456  Last data filed at 3/24/2022 1042  Gross per 24 hour   Intake 940 ml   Output 800 ml   Net 140 ml        Physical Examination:     I had a face to face encounter with this patient and independently examined them on 3/24/2022 as outlined below:          Constitutional:  No acute distress, cooperative, pleasant    ENT:  Oral mucosa moist, oropharynx benign. Resp:  CTA bilaterally. No wheezing/rhonchi/rales.  No accessory muscle use   CV:  Regular rhythm, normal rate, no murmurs, gallops, rubs    GI:  Soft, non distended, non tender. normoactive bowel sounds, no hepatosplenomegaly     Musculoskeletal:  No edema, warm, 2+ pulses throughout    Neurologic:  Moves all extremities. AAOx3, CN II-XII reviewed            Data Review:    Review and/or order of clinical lab test      Labs:     Recent Labs     03/24/22 0205 03/23/22  0611   WBC 15.5* 13.9*   HGB 9.0* 9.1*   HCT 29.1* 28.9*    334     Recent Labs     03/24/22 0205 03/23/22  0611    140   K 3.9 4.1    105   CO2 28 28   BUN 12 12   CREA 0.90 0.77   * 112*   CA 8.6 9.1     Recent Labs     03/24/22 0205 03/23/22 0611   ALT 45 40   AP 37* 38*   TBILI 0.5 0.3   TP 6.4 6.5   ALB 2.8* 2.8*   GLOB 3.6 3.7     No results for input(s): INR, PTP, APTT, INREXT, INREXT in the last 72 hours. No results for input(s): FE, TIBC, PSAT, FERR in the last 72 hours. No results found for: FOL, RBCF   No results for input(s): PH, PCO2, PO2 in the last 72 hours. No results for input(s): CPK, CKNDX, TROIQ in the last 72 hours.     No lab exists for component: CPKMB  Lab Results   Component Value Date/Time    Cholesterol, total 159 03/04/2022 03:10 AM    HDL Cholesterol 38 03/04/2022 03:10 AM    LDL, calculated 79.8 03/04/2022 03:10 AM    Triglyceride 206 (H) 03/04/2022 03:10 AM    CHOL/HDL Ratio 4.2 03/04/2022 03:10 AM     Lab Results   Component Value Date/Time    Glucose (POC) 73 03/03/2022 05:40 PM    Glucose (POC) 63 (L) 03/03/2022 05:06 PM    Glucose (POC) 43 (LL) 03/03/2022 04:08 PM    Glucose (POC) 49 (LL) 03/03/2022 04:05 PM     Lab Results   Component Value Date/Time    Color YELLOW/STRAW 03/18/2022 04:23 PM    Appearance CLOUDY (A) 03/18/2022 04:23 PM    Specific gravity 1.012 03/18/2022 04:23 PM    pH (UA) 6.0 03/18/2022 04:23 PM    Protein Negative 03/18/2022 04:23 PM    Glucose Negative 03/18/2022 04:23 PM    Ketone Negative 03/18/2022 04:23 PM    Bilirubin Negative 03/18/2022 04:23 PM    Urobilinogen 0.2 03/18/2022 04:23 PM Nitrites Negative 03/18/2022 04:23 PM    Leukocyte Esterase TRACE (A) 03/18/2022 04:23 PM    Epithelial cells MANY (A) 03/18/2022 04:23 PM    Bacteria Negative 03/18/2022 04:23 PM    WBC 5-10 03/18/2022 04:23 PM    RBC 0-5 03/18/2022 04:23 PM         Medications Reviewed:     Current Facility-Administered Medications   Medication Dose Route Frequency    gabapentin (NEURONTIN) capsule 600 mg  600 mg Oral TID    methocarbamoL (ROBAXIN) tablet 500 mg  500 mg Oral QID    enoxaparin (LOVENOX) injection 40 mg  40 mg SubCUTAneous Q24H    dexAMETHasone (DECADRON) tablet 4 mg  4 mg Oral Q12H    loperamide (IMODIUM) capsule 2 mg  2 mg Oral Q8H PRN    sodium chloride (NS) flush 5-10 mL  5-10 mL IntraVENous PRN    acyclovir (ZOVIRAX) 650 mg in 0.9% sodium chloride 100 mL IVPB  10 mg/kg IntraVENous Q8H    sodium chloride (NS) flush 5-40 mL  5-40 mL IntraVENous Q8H    sodium chloride (NS) flush 5-40 mL  5-40 mL IntraVENous PRN    acetaminophen (TYLENOL) tablet 650 mg  650 mg Oral Q6H PRN    Or    acetaminophen (TYLENOL) suppository 650 mg  650 mg Rectal Q6H PRN    polyethylene glycol (MIRALAX) packet 17 g  17 g Oral DAILY PRN    ondansetron (ZOFRAN ODT) tablet 4 mg  4 mg Oral Q8H PRN    Or    ondansetron (ZOFRAN) injection 4 mg  4 mg IntraVENous Q6H PRN    oxyCODONE IR (ROXICODONE) tablet 10 mg  10 mg Oral Q6H PRN    HYDROmorphone (DILAUDID) injection 1 mg  1 mg IntraVENous Q6H PRN    aspirin chewable tablet 81 mg  81 mg Oral DAILY    cilostazoL (PLETAL) tablet 100 mg  100 mg Oral DAILY    lidocaine (XYLOCAINE) 4 % cream   Topical BID PRN    [Held by provider] NIFEdipine ER (PROCARDIA XL) tablet 30 mg  30 mg Oral DAILY    polyethylene glycol (MIRALAX) packet 17 g  17 g Oral DAILY     ______________________________________________________________________  EXPECTED LENGTH OF STAY: 3d 12h  ACTUAL LENGTH OF STAY:          6                 Nataly Israel MD

## 2022-03-24 NOTE — PROGRESS NOTES
This author baltazar patient's morning labs from [facility] single lumen sal locked PICC line at 0205, as per [facility] protocol.

## 2022-03-24 NOTE — PROGRESS NOTES
BEDSIDE_VERBAL_RECORDED_WRITTEN:[id number]::\"Bedside\"} shift change report given to Yareli (oncoming nurse) by Erskine Cowden (offgoing nurse). Report included the following information SBAR, Kardex, ED Summary, Procedure Summary, Intake/Output, MAR, Recent Results and Med Rec Status; monitor stools for diarrhea. Pain management. Caitie Cardozo

## 2022-03-24 NOTE — PROGRESS NOTES
Infectious Disease Progress Note         Interval:  NAEO    Subjective:   Patient seen this afternoon in follow up. Reports feeling OK - still having numbness and pain in the right neck region. No new symptoms. Reports these are improving but very gradually. She is noting some mild hearing difference in her right ear for some days. No active lesions seen. Also had loose watery stools past 2-3 days. Started on imodium by primary team and her diarrhea improving. Objective:    Vitals:   Reviewed in chart. Physical Exam:  Gen: No apparent distress  HEENT:  Normocephalic, atraumatic, no scleral icterus, no thrush,  CV: Hemodynamically stable  Lungs: On room air  Abdomen: soft, non tender, non distended  Genitourinary:  no bradley catheter   Skin: no rash, no active lesions of zoster are seen.   Psych: good affect, good eye contact, non tearful  Neuro: alert, oriented to time,  place, and situation, moves all extremities to commands, verbal  Musculoskeletal:  No joint edema, erythema or tenderness noted   Lines: PICC line        Labs:  Recent Results (from the past 24 hour(s))   CBC W/O DIFF    Collection Time: 03/24/22  2:05 AM   Result Value Ref Range    WBC 15.5 (H) 3.6 - 11.0 K/uL    RBC 3.31 (L) 3.80 - 5.20 M/uL    HGB 9.0 (L) 11.5 - 16.0 g/dL    HCT 29.1 (L) 35.0 - 47.0 %    MCV 87.9 80.0 - 99.0 FL    MCH 27.2 26.0 - 34.0 PG    MCHC 30.9 30.0 - 36.5 g/dL    RDW 16.7 (H) 11.5 - 14.5 %    PLATELET 016 057 - 971 K/uL    MPV 8.5 (L) 8.9 - 12.9 FL    NRBC 0.7 (H) 0  WBC    ABSOLUTE NRBC 0.11 (H) 0.00 - 2.59 K/uL   METABOLIC PANEL, COMPREHENSIVE    Collection Time: 03/24/22  2:05 AM   Result Value Ref Range    Sodium 136 136 - 145 mmol/L    Potassium 3.9 3.5 - 5.1 mmol/L    Chloride 104 97 - 108 mmol/L    CO2 28 21 - 32 mmol/L    Anion gap 4 (L) 5 - 15 mmol/L    Glucose 113 (H) 65 - 100 mg/dL    BUN 12 6 - 20 MG/DL    Creatinine 0.90 0.55 - 1.02 MG/DL    BUN/Creatinine ratio 13 12 - 20      GFR est AA >60 >60 ml/min/1.73m2    GFR est non-AA >60 >60 ml/min/1.73m2    Calcium 8.6 8.5 - 10.1 MG/DL    Bilirubin, total 0.5 0.2 - 1.0 MG/DL    ALT (SGPT) 45 12 - 78 U/L    AST (SGOT) 40 (H) 15 - 37 U/L    Alk. phosphatase 37 (L) 45 - 117 U/L    Protein, total 6.4 6.4 - 8.2 g/dL    Albumin 2.8 (L) 3.5 - 5.0 g/dL    Globulin 3.6 2.0 - 4.0 g/dL    A-G Ratio 0.8 (L) 1.1 - 2.2               Assessment:  40year-old with recent diagnosis of VZV meningoencephalitis and cervical cord myelitis. Afebrile this admission. MRI brain 3/20/2022 showing slight improvement in the nonenhancing signal abnormality in the right medulla and cervicomedullary junction, no new intracranial abnormality. Repeat cervical cord MRI 3/20/2022 shows significant improvement in nonenhancing intramedullary signal abnormality at the upper cervical spinal cord/cervical medullary junction. Patient is reporting some clinical improvement in the numbness on her neck and face. She reports she still has some but this is reducing day by day. Patient is also on Decadron this admission. Repeat LP this admission showed pleocytosis of 13 which is reduced from before. Protein was elevated at 51. Meningitis panel negative, including for VZV this time. Recommendations:  Leukocytosis likely 2/2 steroids. Patient clinically the same - still having numbness and pain in the right neck region. No new symptoms. Reports these are improving but very gradually. She is noting some mild hearing difference in her right ear for some days. No active lesions seen. Also had loose watery stools past 2-3 days. Started on imodium by primary team and her diarrhea improving. Primary team asked about Lyme IgG P41 Ab being positive on labs sent on 3/4/22 - this is not reflective of active Lyme disease.       - Recommend she get audiometry testing after discharge. - Patient off upadacitinib since some weeks now.  I told her to seek advice of Dr. Nano Dunne next week at follow up as well as her rheumatologist to when to resume the medication.   - All other recommendations per ID note from 3/22/22. ID will now sign off, please recall as needed. Thank you for the opportunity to participate in the care of this patient. Please contact with questions or concerns.       Samantha Melchor MD  Infectious Diseases

## 2022-03-24 NOTE — PROGRESS NOTES
Bedside and Verbal shift change report given to Lucero RN (oncoming nurse) by Garcia Ramos RN (offgoing nurse). Report included the following information SBAR, Kardex, Intake/Output, MAR and Med Rec Status.

## 2022-03-25 VITALS
DIASTOLIC BLOOD PRESSURE: 68 MMHG | SYSTOLIC BLOOD PRESSURE: 114 MMHG | RESPIRATION RATE: 16 BRPM | BODY MASS INDEX: 24.62 KG/M2 | WEIGHT: 144.18 LBS | HEART RATE: 91 BPM | TEMPERATURE: 98.3 F | HEIGHT: 64 IN | OXYGEN SATURATION: 99 %

## 2022-03-25 PROBLEM — M54.2 NECK PAIN: Status: ACTIVE | Noted: 2022-03-25

## 2022-03-25 LAB
ALBUMIN SERPL-MCNC: 2.7 G/DL (ref 3.5–5)
ALBUMIN/GLOB SERPL: 0.8 {RATIO} (ref 1.1–2.2)
ALP SERPL-CCNC: 36 U/L (ref 45–117)
ALT SERPL-CCNC: 55 U/L (ref 12–78)
ANION GAP SERPL CALC-SCNC: 2 MMOL/L (ref 5–15)
AST SERPL-CCNC: 43 U/L (ref 15–37)
BACTERIA SPEC CULT: NORMAL
BILIRUB SERPL-MCNC: 0.8 MG/DL (ref 0.2–1)
BUN SERPL-MCNC: 12 MG/DL (ref 6–20)
BUN/CREAT SERPL: 16 (ref 12–20)
CALCIUM SERPL-MCNC: 8.6 MG/DL (ref 8.5–10.1)
CHLORIDE SERPL-SCNC: 105 MMOL/L (ref 97–108)
CO2 SERPL-SCNC: 31 MMOL/L (ref 21–32)
CREAT SERPL-MCNC: 0.77 MG/DL (ref 0.55–1.02)
ERYTHROCYTE [DISTWIDTH] IN BLOOD BY AUTOMATED COUNT: 17.1 % (ref 11.5–14.5)
GLOBULIN SER CALC-MCNC: 3.4 G/DL (ref 2–4)
GLUCOSE SERPL-MCNC: 100 MG/DL (ref 65–100)
GRAM STN SPEC: NORMAL
HCT VFR BLD AUTO: 29.6 % (ref 35–47)
HGB BLD-MCNC: 9.4 G/DL (ref 11.5–16)
MCH RBC QN AUTO: 28.1 PG (ref 26–34)
MCHC RBC AUTO-ENTMCNC: 31.8 G/DL (ref 30–36.5)
MCV RBC AUTO: 88.6 FL (ref 80–99)
NRBC # BLD: 0.13 K/UL (ref 0–0.01)
NRBC BLD-RTO: 0.9 PER 100 WBC
PLATELET # BLD AUTO: 360 K/UL (ref 150–400)
PMV BLD AUTO: 8.4 FL (ref 8.9–12.9)
POTASSIUM SERPL-SCNC: 4 MMOL/L (ref 3.5–5.1)
PROT SERPL-MCNC: 6.1 G/DL (ref 6.4–8.2)
RBC # BLD AUTO: 3.34 M/UL (ref 3.8–5.2)
SERVICE CMNT-IMP: NORMAL
SODIUM SERPL-SCNC: 138 MMOL/L (ref 136–145)
WBC # BLD AUTO: 14 K/UL (ref 3.6–11)
WBC #/AREA STL HPF: NORMAL /HPF (ref 0–4)

## 2022-03-25 PROCEDURE — 85027 COMPLETE CBC AUTOMATED: CPT

## 2022-03-25 PROCEDURE — 74011250637 HC RX REV CODE- 250/637: Performed by: PSYCHIATRY & NEUROLOGY

## 2022-03-25 PROCEDURE — 74011000258 HC RX REV CODE- 258: Performed by: EMERGENCY MEDICINE

## 2022-03-25 PROCEDURE — 36415 COLL VENOUS BLD VENIPUNCTURE: CPT

## 2022-03-25 PROCEDURE — 74011250637 HC RX REV CODE- 250/637: Performed by: INTERNAL MEDICINE

## 2022-03-25 PROCEDURE — 74011250636 HC RX REV CODE- 250/636: Performed by: EMERGENCY MEDICINE

## 2022-03-25 PROCEDURE — 80053 COMPREHEN METABOLIC PANEL: CPT

## 2022-03-25 PROCEDURE — 74011000250 HC RX REV CODE- 250: Performed by: INTERNAL MEDICINE

## 2022-03-25 PROCEDURE — 74011250636 HC RX REV CODE- 250/636: Performed by: INTERNAL MEDICINE

## 2022-03-25 RX ORDER — LOPERAMIDE HYDROCHLORIDE 2 MG/1
2 CAPSULE ORAL
Qty: 15 CAPSULE | Refills: 0 | Status: SHIPPED | OUTPATIENT
Start: 2022-03-25 | End: 2022-03-30

## 2022-03-25 RX ORDER — GABAPENTIN 300 MG/1
600 CAPSULE ORAL 3 TIMES DAILY
Qty: 84 CAPSULE | Refills: 0 | Status: SHIPPED | OUTPATIENT
Start: 2022-03-25 | End: 2022-04-11 | Stop reason: SDUPTHER

## 2022-03-25 RX ORDER — METHOCARBAMOL 500 MG/1
500 TABLET, FILM COATED ORAL 4 TIMES DAILY
Qty: 56 TABLET | Refills: 0 | Status: SHIPPED | OUTPATIENT
Start: 2022-03-25 | End: 2022-04-11 | Stop reason: SDUPTHER

## 2022-03-25 RX ORDER — OXYCODONE HYDROCHLORIDE 10 MG/1
10 TABLET ORAL
Qty: 12 TABLET | Refills: 0 | Status: SHIPPED | OUTPATIENT
Start: 2022-03-25 | End: 2022-03-28

## 2022-03-25 RX ORDER — DEXAMETHASONE 4 MG/1
4 TABLET ORAL 2 TIMES DAILY
Qty: 10 TABLET | Refills: 0 | Status: SHIPPED | OUTPATIENT
Start: 2022-03-25 | End: 2022-04-01

## 2022-03-25 RX ADMIN — OXYCODONE 10 MG: 5 TABLET ORAL at 13:14

## 2022-03-25 RX ADMIN — SODIUM CHLORIDE, PRESERVATIVE FREE 10 ML: 5 INJECTION INTRAVENOUS at 13:15

## 2022-03-25 RX ADMIN — DEXAMETHASONE 4 MG: 4 TABLET ORAL at 09:15

## 2022-03-25 RX ADMIN — METHOCARBAMOL TABLETS 500 MG: 500 TABLET, COATED ORAL at 12:08

## 2022-03-25 RX ADMIN — SODIUM CHLORIDE, PRESERVATIVE FREE 10 ML: 5 INJECTION INTRAVENOUS at 00:06

## 2022-03-25 RX ADMIN — HYDROMORPHONE HYDROCHLORIDE 1 MG: 1 INJECTION, SOLUTION INTRAMUSCULAR; INTRAVENOUS; SUBCUTANEOUS at 00:05

## 2022-03-25 RX ADMIN — OXYCODONE 10 MG: 5 TABLET ORAL at 06:56

## 2022-03-25 RX ADMIN — GABAPENTIN 600 MG: 300 CAPSULE ORAL at 09:10

## 2022-03-25 RX ADMIN — METHOCARBAMOL TABLETS 500 MG: 500 TABLET, COATED ORAL at 09:10

## 2022-03-25 RX ADMIN — ACYCLOVIR SODIUM 650 MG: 50 INJECTION, SOLUTION INTRAVENOUS at 14:02

## 2022-03-25 RX ADMIN — ASPIRIN 81 MG: 81 TABLET, CHEWABLE ORAL at 09:10

## 2022-03-25 RX ADMIN — ACYCLOVIR SODIUM 650 MG: 50 INJECTION, SOLUTION INTRAVENOUS at 05:46

## 2022-03-25 RX ADMIN — CILOSTAZOL 100 MG: 100 TABLET ORAL at 09:10

## 2022-03-25 NOTE — PROGRESS NOTES
Discharge orders in    Discharge education, instructions, documents, and AVS given to patient and verbalized understanding. Patient to be d/c'd with PICC, patent and intact. Dressing c/d/i. Patient no pain. No distress. Respirations even and unlabored. No complaints. To be transported by spouse.

## 2022-03-25 NOTE — PROGRESS NOTES
Bedside, Verbal and Written shift change report given to Yareli RN (oncoming nurse) by Sherrie Essex RN (offgoing nurse).  Report included the following information SBAR, Kardex, ED Summary, Procedure Summary, Intake/Output, MAR, Recent Results and Med Rec Status

## 2022-03-25 NOTE — PROGRESS NOTES
Problem: Falls - Risk of  Goal: *Absence of Falls  Description: Document Quinn Love Fall Risk and appropriate interventions in the flowsheet.   Outcome: Progressing Towards Goal  Note: Fall Risk Interventions:            Medication Interventions: Teach patient to arise slowly                   Problem: Pain  Goal: *Control of Pain  Outcome: Progressing Towards Goal  Goal: *PALLIATIVE CARE:  Alleviation of Pain  Outcome: Progressing Towards Goal     Problem: Patient Education: Go to Patient Education Activity  Goal: Patient/Family Education  Outcome: Progressing Towards Goal

## 2022-03-25 NOTE — DISCHARGE SUMMARY
.     Discharge Summary       PATIENT ID: Preston Ramirez  MRN: [id number]   YOB: 1985    DATE OF ADMISSION: 3/18/2022  1:46 PM    DATE OF DISCHARGE: 3/25/2022  PRIMARY CARE PROVIDER: Rupali Knox DO     ATTENDING PHYSICIAN: Hernan Gardiner MD  DISCHARGING PROVIDER: Hernan Gardiner MD    To contact this individual call [phone] and ask the  to page. If unavailable ask to be transferred the Adult Hospitalist Department. CONSULTATIONS: IP CONSULT TO INFECTIOUS DISEASES  IP CONSULT TO NEUROLOGY  IP CONSULT TO NEUROLOGY    PROCEDURES/SURGERIES: * No surgery found *    ADMITTING DIAGNOSES & HOSPITAL COURSE:   \"Ms. Catalina Chaney is a 40 y.o.  female with a past medical history of rheumatoid arthritis previously on upadacitinib, no nodes, tachycardia and recent acute varicella-zoster meningoencephalitis who is admitted with sepsis. Ms. Catalina Chaney was discharged on March 9, 2020 from [facility] after being admitted for  acute varicella-zoster meningoencephalitis. During her hospital stay, she was treated with steroids and started on acyclovir. She was discharged with a PICC line and has completed 16 days of therapy. Today she followed up with infectious disease outpatient who sent her to the emergency room due to concern for fevers. Patient states she continues to have numbness, burning and tenderness of her neck and right upper extremity. She states the pain pressure in her neck right shoulder slightly improved. She also endorses some shortness of breath. She has some pain of her joints. She states that she has been experiencing some fevers at home. \"  DISCHARGE DIAGNOSES / PLAN:      Fever:   Resolved P/w leukocytosis tachycardia but no obvious suspected source. WBC trended down and fever resolved, with no antibiotics. WBC likely elevated due to high-dose steroids. No signs or symptoms of pneumonia, UTI, cellulitis. Repeat LP reassuring and receiving tx for VZV.    No obvious skin lesions. BCx NGTD. Consider RA related, but she has no active synovitis to suggest flare. She was endorsing unilateral head/neck pain, burning pressure which much improved with gabapentin and Robaxin. Unlikely GCA or trigeminal neuralgia. ESR fairly elevated. Started on steroids. Procalcitonin low. Repeat brain MRI and C-spine MRI showed significant improvement of lesions. Patient had already had PICC line from previous admission for acyclovir. Patient was restarted on valacyclovir, blood cultures were obtained which remained negative, she was also started on steroids    Patient advised to continue taking steroids for another 5 days along with her acyclovir and follow-up with neurology and infectious disease specialist outpatient. Denied any focal neurologic symptoms. No discharge. Sinus tachycardia:  Resolved. Echo normal. CTA neg for PE. Likely due to pain and fever.       Acute myelitis / Acute Varicella Zoster Meningoencephalitis:   Infection due to immunocompromised state. S/p LP during prior hospitalization and repeat LP 3/18 in ER reassuring.  Aquaporin Ab neg. Acyclovir continued, discharged on acyclovir to continue until 3/31/2022. Repeat imaging showed significant improvement of lesions. Denies any focal neurologic symptoms at the time of discharge.     RUE Pain and paresthesias: Likely due to myelitis.   Completed high dose IV steroids on prior admission.   Dopplers neg, significant improvement with gabapentin and Robaxin.     Hyperkalemia/ hyponatremia:  Resolved.       Rheumatoid arthritis/ Chronic immunosupppression: Last dose of upadacitinib early March due to varicella zoster infection. No active synovitis on exam     Raynaud's: Continue Pletal.  CCB was held due to soft BPs.   Advised to follow-up with PCP and rheumatologist talk about resumption of CCB          PENDING TEST RESULTS:   At the time of discharge the following test results are still pending: 0    FOLLOW UP APPOINTMENTS:    Follow-up Information     Follow up With Specialties Details Why Contact Info    Vandana Severino MD Neurology Schedule an appointment as soon as possible for a visit in 3 weeks Schedule follow up visit with Dr Maia Stevens (Neurologist) for [facility] myelitis\" [address]  [address]  [facility] 99 [phone]      [facility] Go on 3/29/2022 Hospital Follow Up at 11:40am [address]  [facility] [address]      Raghu Alatorre MD Neurology In 1 week  [address] [facility] [address] [facility] [address]  [phone]      Torsten Healy DO Infectious Disease In 1 week  [address] [facility]  [phone]             ADDITIONAL CARE RECOMMENDATIONS: PCP, rheumatology infectious disease and neurology follow-up. DISCHARGE MEDICATIONS:  Current Discharge Medication List      START taking these medications    Details   dexAMETHasone (DECADRON) 4 mg tablet Take 4 mg by mouth two (2) times a day. Qty: 10 Tablet, Refills: 0  Start date: 3/25/2022      loperamide (IMODIUM) 2 mg capsule Take 1 Capsule by mouth every eight (8) hours as needed for Diarrhea for up to 5 days. Indications: diarrhea  Qty: 15 Capsule, Refills: 0  Start date: 3/25/2022, End date: 3/30/2022      methocarbamoL (ROBAXIN) 500 mg tablet Take 1 Tablet by mouth four (4) times daily for 14 days. Qty: 56 Tablet, Refills: 0  Start date: 3/25/2022, End date: 4/8/2022         CONTINUE these medications which have CHANGED    Details   gabapentin (NEURONTIN) 300 mg capsule Take 2 Capsules by mouth three (3) times daily for 14 days. Max Daily Amount: 1,800 mg. Qty: 84 Capsule, Refills: 0  Start date: 3/25/2022, End date: 4/8/2022    Associated Diagnoses: Acute viral transverse myelitis ([facility] [address]  Neuropathic pain; Neuropathic pain, arm; Right facial numbness      oxyCODONE IR (ROXICODONE) 10 mg tab immediate release tablet Take 1 Tablet by mouth every six (6) hours as needed for Pain for up to 3 days. Max Daily Amount: 40 mg.  Qty: 12 Tablet, Refills: 0  Start date: 3/25/2022, End date: 3/28/2022    Associated Diagnoses: Neck pain         CONTINUE these medications which have NOT CHANGED    Details   NIFEdipine ER (ADALAT CC) 30 mg ER tablet Take 30 mg by mouth nightly. acyclovir 667 mg 667 mg by IntraVENous route every eight (8) hours for 22 days. Qty: 66 Dose, Refills: 0      aspirin 81 mg chewable tablet Take 1 Tablet by mouth daily for 30 days. Qty: 30 Tablet, Refills: 0      lidocaine (XYLOCAINE) 4 % topical cream Apply  to affected area two (2) times daily as needed for Pain for up to 30 days. Qty: 15 g, Refills: 2      polyethylene glycol (MIRALAX) 17 gram packet Take 1 Packet by mouth daily for 30 days. Qty: 30 Packet, Refills: 0      cilostazoL (PLETAL) 50 mg tablet Take 100 mg by mouth daily. drospirenone, contraceptive, (Slynd) 4 mg (28) tab Take 1 Tablet by mouth daily. NOTIFY YOUR PHYSICIAN FOR ANY OF THE FOLLOWING:   Fever over 101 degrees for 24 hours. Chest pain, shortness of breath, fever, chills, nausea, vomiting, diarrhea, change in mentation, falling, weakness, bleeding. Severe pain or pain not relieved by medications. Or, any other signs or symptoms that you may have questions about.     DISPOSITION:    Home With:   OT  PT  HH  RN       Long term SNF/Inpatient Rehab    Independent/assisted living    Hospice    Other:       PATIENT CONDITION AT DISCHARGE:     Functional status    Poor     Deconditioned     Independent      Cognition     Lucid     Forgetful     Dementia      Catheters/lines (plus indication)    [place]     PICC     PEG     None      Code status     Full code     DNR      PHYSICAL EXAMINATION AT DISCHARGE:   Refer to Progress Note while inpatient      CHRONIC MEDICAL DIAGNOSES:  Problem List as of 3/25/2022 Never Reviewed          Codes Class Noted - Resolved    Neck pain ICD-10-CM: M54.2  ICD-9-CM: 723.1  3/25/2022 - Present        Meningitis ICD-10-CM: G03.9  ICD-9-CM: 322.9  3/18/2022 - Present        Sepsis ([facility] [address] ICD-10-CM: A41.9  ICD-9-CM: 038.9, 995.91  3/18/2022 - Present        Acute myelitis ([facility] ICD-10-CM: G62.70  ICD-9-CM: 341.20  3/4/2022 - Present        Right facial numbness ICD-10-CM: R20.0  ICD-9-CM: 782.0  3/3/2022 - Present              Greater than 35 minutes were spent with the patient on counseling and coordination of care    Signed:   Ole Cox MD  3/25/2022  1:40 PM

## 2022-03-25 NOTE — PROGRESS NOTES
3/25/2022  Case Management Progress Note    11:34 AM  Patient is 40year old female admitted 3/18 for meningitis  Patient's RUR is 12% low risk of readmission, however she is a readmission to this hospital  Covid test: negative 3/18   Chart reviewed--patient discussed at IDR rounds   Per rounds this morning patient is ready for discharge today, and the order has been placed. I have let Aris Michael know that patient is returning home today. She does not have any other noted needs at this time. Plan remains for her to return home with family assistance and IVs through [facility]. Patient is ok for discharge from  standpoint. Transition of Care Plan   1. Discharge today, order already placed  2. Home with IVs through [facility] and family assist  3. Family will transport at discharge  4. Follow up outpatient as needed  5. OK for discharge from CM standpoint    Care Management Interventions  PCP Verified by CM: Yes  Mode of Transport at Discharge:  Other (see comment)  Support Systems: Spouse/Significant Other,Parent(s)  Confirm Follow Up Transport: Family  Discharge Location  Patient Expects to be Discharged to[de-identified] Home with infusion therapy    FREDERIC Pitts

## 2022-03-25 NOTE — DISCHARGE INSTRUCTIONS
Please follow-up with neurology and infectious disease specialist after finishing your IV antibiotic. Please come back to ED if any sign of worsening headache, any new rash, fever chills or numbness and weakness.

## 2022-03-25 NOTE — PROGRESS NOTES
Hospital follow up. Recording stated call center at lunch will try back later.    Scheduled patient for PCP Dr Angelita Guzman for 03/29/2022 at 11:40am. Called and spoke directly to patient and advised her of this information

## 2022-03-30 ENCOUNTER — OFFICE VISIT (OUTPATIENT)
Dept: INFECTIOUS DISEASES | Age: 37
End: 2022-03-30
Payer: COMMERCIAL

## 2022-03-30 VITALS
TEMPERATURE: 98.1 F | HEART RATE: 88 BPM | HEIGHT: 64 IN | BODY MASS INDEX: 24.75 KG/M2 | WEIGHT: 145 LBS | DIASTOLIC BLOOD PRESSURE: 77 MMHG | SYSTOLIC BLOOD PRESSURE: 116 MMHG | OXYGEN SATURATION: 97 %

## 2022-03-30 DIAGNOSIS — G04.91 ACUTE MYELITIS (HCC): Primary | ICD-10-CM

## 2022-03-30 PROCEDURE — 99214 OFFICE O/P EST MOD 30 MIN: CPT | Performed by: INTERNAL MEDICINE

## 2022-03-30 RX ORDER — OXYCODONE HYDROCHLORIDE 5 MG/1
TABLET ORAL
COMMUNITY
Start: 2022-03-25 | End: 2022-08-23

## 2022-03-30 NOTE — PROGRESS NOTES
Identified pt with two pt identifiers. Reviewed record in preparation for visit and have obtained necessary documentation. All patient medications has been reviewed. Chief Complaint   Patient presents with   [facility] Follow Up     Additional information about chief complaint:    Visit Vitals  /77 (BP 1 Location: Left upper arm, BP Patient Position: Sitting, BP Cuff Size: Adult)   Pulse 88   Temp 98.1 °F (36.7 °C) (Temporal)   Ht 5' 4\" (1.626 m)   Wt 145 lb (65.8 kg)   SpO2 97%   BMI 24.89 kg/m²       Health Maintenance Due   Topic    Hepatitis C Screening     DTaP/Tdap/Td series (1 - Tdap)    COVID-19 Vaccine (3 - Booster for Moderna series)       1. Have you been to the ER, urgent care clinic since your last visit? Hospitalized since your last visit? N/a    2. Have you seen or consulted any other health care providers outside of the [address] [place] [address] since your last visit?   N/a

## 2022-03-30 NOTE — PROGRESS NOTES
[facility] Disease Special[facility] Progress Note           Zbigniew Alarcon DO    [phone] Office  [phone]  Fax    3/30/2022      Assessment & Plan:   1. History of recent hospitalization for varicella zoster meningoencephalitis/myelitis. Readmitted to the hospital 3/18-3/25 due to fevers. Repeat CSF studies showed CSF WBC down to 4 from 31. Meningitis pathogens panel negative for VZV and repeat MRI of the brain and cervical spine showed significant improvement of lesions. Patient was discharged with plans to continue acyclovir through tomorrow which will complete approximately a 4-week course of therapy. She feels better overall but still is experiencing right neck pressure and muscle spasms. Some improvement with gabapentin. I will see if she can get in to see neurology to see if they have any suggestions regarding ongoing pain. She may need an extended steroid taper versus adjustment of the gabapentin. As CSF WBC count is normalized and CSF meningitis pathogens panel negative will stop antiviral therapy. 2. Rheumatoid arthritis. Was on upadacitinib which was stopped at previous admission. .  This medication has been linked with an increased risk of herpes zoster in patients with rheumatoid arthritis. Patient to follow-up with rheumatology  3. Chronic immunosuppression. Due to above  4. 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 The WNV represents a false positive. Repeat CSF testing for WNV was negative.   The Lyme is not significant as only 1 band was positive           Subjective:     Overall better but right neck pressure/pain    Objective:     Vitals:   Visit Vitals  /77 (BP 1 Location: Left upper arm, BP Patient Position: Sitting, BP Cuff Size: Adult)   Pulse 88   Temp 98.1 °F (36.7 °C) (Temporal)   Ht 5' 4\" (1.626 m)   Wt 145 lb (65.8 kg)   SpO2 97%   BMI 24.89 kg/m²          Exam:   Physical Examination:   General:  Alert, cooperative, no distress Head:  Normocephalic, atraumatic. Eyes:  Conjunctivae clear   Neck: Supple. No adenopathy. Muscle tension noted on right       Lungs:   No distress. Chest wall:     Heart:     Abdomen:    non-distended   Extremities: Moves all. Skin:  No rash   Neurologic: CNII-XII intact. Normal strength     Labs:        No lab exists for component: ITNL   No results for input(s): CPK, CKMB, TROIQ in the last 72 hours. No results for input(s): NA, K, CL, CO2, BUN, CREA, GLU, PHOS, MG, ALB, WBC, HGB, HCT, PLT, HGBEXT, HCTEXT, PLTEXT in the last 72 hours. No lab exists for component:  CA  No results for input(s): INR, PTP, APTT, INREXT in the last 72 hours. Needs: urine analysis, urine sodium, protein and creatinine  No results found for: SRINIVAS, CREAU      Cultures:     No results found for: SDES  Lab Results   Component Value Date/Time    Culture result: Culture performed on Unspun Fluid 03/18/2022 06:46 PM    Culture result: NO GROWTH ON SOLID MEDIA AT 4 DAYS 03/18/2022 06:46 PM    Culture result: NO GROWTH IN THIO BROTH AT 7 DAYS 03/18/2022 06:46 PM       Radiology:     Medications       Current Outpatient Medications   Medication Sig Dispense    oxyCODONE IR (ROXICODONE) 5 mg immediate release tablet TAKE 1 TABLET BY MOUTH EVERY 6 HOURS AS NEEDED FOR 7 DAYS     gabapentin (NEURONTIN) 300 mg capsule Take 2 Capsules by mouth three (3) times daily for 14 days. Max Daily Amount: 1,800 mg. 84 Capsule    methocarbamoL (ROBAXIN) 500 mg tablet Take 1 Tablet by mouth four (4) times daily for 14 days. 56 Tablet    dexAMETHasone (DECADRON) 4 mg tablet Take 4 mg by mouth two (2) times a day. (Patient not taking: Reported on 3/30/2022) 10 Tablet    loperamide (IMODIUM) 2 mg capsule Take 1 Capsule by mouth every eight (8) hours as needed for Diarrhea for up to 5 days.  Indications: diarrhea (Patient not taking: Reported on 3/30/2022) 15 Capsule    acyclovir 667 mg 667 mg by IntraVENous route every eight (8) hours for 22 days. (Patient not taking: Reported on 3/30/2022) 66 Dose    aspirin 81 mg chewable tablet Take 1 Tablet by mouth daily for 30 days. (Patient not taking: Reported on 3/18/2022) 30 Tablet    lidocaine (XYLOCAINE) 4 % topical cream Apply  to affected area two (2) times daily as needed for Pain for up to 30 days. (Patient not taking: Reported on 3/30/2022) 15 g    polyethylene glycol (MIRALAX) 17 gram packet Take 1 Packet by mouth daily for 30 days. (Patient not taking: Reported on 3/30/2022) 30 Packet    cilostazoL (PLETAL) 50 mg tablet Take 100 mg by mouth daily. (Patient not taking: Reported on 3/30/2022)     NIFEdipine ER (ADALAT CC) 30 mg ER tablet Take 30 mg by mouth nightly. (Patient not taking: Reported on 3/30/2022)     drospirenone, contraceptive, (Slynd) 4 mg (28) tab Take 1 Tablet by mouth daily. (Patient not taking: Reported on 3/30/2022)      No current facility-administered medications for this visit.            Case discussed with:      Wilder Pino DO

## 2022-04-01 ENCOUNTER — OFFICE VISIT (OUTPATIENT)
Dept: NEUROLOGY | Age: 37
End: 2022-04-01
Payer: COMMERCIAL

## 2022-04-01 VITALS
SYSTOLIC BLOOD PRESSURE: 110 MMHG | BODY MASS INDEX: 25.12 KG/M2 | WEIGHT: 141.8 LBS | DIASTOLIC BLOOD PRESSURE: 70 MMHG | HEIGHT: 63 IN

## 2022-04-01 DIAGNOSIS — G04.91 MYELITIS (HCC): Primary | ICD-10-CM

## 2022-04-01 DIAGNOSIS — Z09 HOSPITAL DISCHARGE FOLLOW-UP: ICD-10-CM

## 2022-04-01 PROCEDURE — 1111F DSCHRG MED/CURRENT MED MERGE: CPT | Performed by: PSYCHIATRY & NEUROLOGY

## 2022-04-01 PROCEDURE — 99215 OFFICE O/P EST HI 40 MIN: CPT | Performed by: PSYCHIATRY & NEUROLOGY

## 2022-04-01 RX ORDER — DULOXETIN HYDROCHLORIDE 60 MG/1
60 CAPSULE, DELAYED RELEASE ORAL
Qty: 30 CAPSULE | Refills: 3 | Status: SHIPPED | OUTPATIENT
Start: 2022-04-01 | End: 2022-04-25 | Stop reason: DRUGHIGH

## 2022-04-01 NOTE — PROGRESS NOTES
Neurology Progress Note    Patient ID:  Yogesh Arauz  [id number]  23 y.o.  1985      Subjective:   History:  Yogesh Arauz is a 39 y.o. female who  has a past medical history of Raynaud's disease and Rheumatoid arthritis ([facility] [address] who was admitted 3/4/22 at [facility] for 2 weeks R neck pain. Eventually, patient developed numbness of the R side of face and R neck. (+) R UE weakness. MRI brain unremarkable. 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 Patient given SoluMedrol and ID Dr Hu Gonzalez consulted for (+) VZV     Pt reports she continues to have the right sided head numbness, pressure and squeezing 10/10, constant. Currently on Gabapentin 600 mg TID. Valtrex was stopped 2 days ago  Prednisone ended 3 days ago    Patient recently saw Dr Hu Gonzalez who said:  1. \"Repeat CSF studies showed CSF WBC down to 4 from 31. Meningitis pathogens panel negative for VZV and repeat MRI of the brain and cervical spine showed significant improvement of lesions. Patient was discharged with plans to continue acyclovir through tomorrow which will complete approximately a 4-week course of therapy. She feels better overall but still is experiencing right neck pressure and muscle spasms. Some improvement with gabapentin. I will see if she can get in to see neurology to see if they have any suggestions regarding ongoing pain. She may need an extended steroid taper versus adjustment of the gabapentin. As CSF WBC count is normalized and CSF meningitis pathogens panel negative will stop antiviral therapy. 2. Rheumatoid arthritis.  Was on upadacitinib which was stopped at previous admission. .  This medication has been linked with an increased risk of herpes zoster in patients with rheumatoid arthritis.   Patient to follow-up with rheumatology\"        ROS:  Per HPI-  Otherwise the remainder of ROS was negative    Social Hx  Social History Socioeconomic History    Marital status:    Tobacco Use    Smoking status: Never Smoker    Smokeless tobacco: Never Used   Substance and Sexual Activity    Alcohol use: Not Currently    Drug use: Not Currently       Meds:  Current Outpatient Medications on File Prior to Visit   Medication Sig Dispense Refill    oxyCODONE IR (ROXICODONE) 5 mg immediate release tablet TAKE 1 TABLET BY MOUTH EVERY 6 HOURS AS NEEDED FOR 7 DAYS      gabapentin (NEURONTIN) 300 mg capsule Take 2 Capsules by mouth three (3) times daily for 14 days. Max Daily Amount: 1,800 mg. 84 Capsule 0    methocarbamoL (ROBAXIN) 500 mg tablet Take 1 Tablet by mouth four (4) times daily for 14 days. 56 Tablet 0    dexAMETHasone (DECADRON) 4 mg tablet Take 4 mg by mouth two (2) times a day. (Patient not taking: Reported on 3/30/2022) 10 Tablet 0    [] acyclovir 667 mg 667 mg by IntraVENous route every eight (8) hours for 22 days. (Patient not taking: Reported on 3/30/2022) 66 Dose 0     No current facility-administered medications on file prior to visit. Imaging:    CT Results (recent):  Results from Hospital Encounter encounter on 22    CTA CHEST W OR W WO CONT    Narrative  EXAM: CTA CHEST W OR W WO CONT    INDICATION: SOB    COMPARISON: None. CONTRAST: 80 mL of Isovue-370. TECHNIQUE:  Precontrast  images were obtained to localize the volume for acquisition. Multislice helical CT arteriography was performed from the diaphragm to the  thoracic inlet during uneventful rapid bolus intravenous contrast  administration. Lung and soft tissue windows were generated. Coronal and  sagittal images were generated and 3D post processing consisting of coronal  maximum intensity images was performed. CT dose reduction was achieved through  use of a standardized protocol tailored for this examination and automatic  exposure control for dose modulation. FINDINGS:  THYROID: No nodule.   MEDIASTINUM: No mass or lymphadenopathy. Residual thymic tissue is seen in the  anterior mediastinum. RIA: Subcentimeter lymph nodes in both ria are likely reactive. THORACIC AORTA: No dissection or aneurysm. PULMONARY ARTERIES: Normal in caliber. The pulmonary arteries are well enhanced. No evidence of pulmonary embolus. TRACHEA/BRONCHI: Patent. ESOPHAGUS: No wall thickening or dilatation. HEART: Normal in size. PLEURA: No effusion or pneumothorax. LUNGS: No nodule, mass, or airspace disease. Mild atelectasis is seen in the  lung bases. INCIDENTALLY IMAGED UPPER ABDOMEN: No focal abnormality. BONES: No destructive bone lesion. Impression  1. Negative for pulmonary embolus. 2. Mild atelectasis in the lung bases. [facility] MRI Results (recent):  Results from [facility] on 03/18/22    MRI CERV SPINE W WO CONT    Narrative  INDICATION:  Meningoencephalitis    COMPARISON: March 3, 2022    TECHNIQUE: Multiplanar multisequence acquisition of the cervical spine. CONTRAST: With and without IV contrast    FINDINGS:    ALIGNMENT:  Kyphosis at C4-5. VERTEBRAL BODIES:  No compression fracture. MARROW SIGNAL: No significant edema. SPINAL CORD:  Significant interval improvement in previous extensive  nonenhancing intramedullary signal abnormality extending from the dorsal medulla  to the C5 level; now, signal abnormality only extends from the inferior medulla  to the mid C2 level. Previous mild expansion of the cervical spinal cord has  also improved. No areas of abnormal intramedullary enhancement. ADDITIONAL COMMENTS: N/A.    C2/3:   The spinal canal and foramina are widely patent. C3/4:   The spinal canal and foramina are widely patent. C4/5:  Mild disc bulge though without significant spinal canal or foraminal  stenosis. C5/6:  Posterior disc osteophyte complex, minimal if any spinal canal stenosis. Foramina are patent.     C6/7:  Posterior disc osteophyte complex, minimal if any spinal canal stenosis. Foramina are patent. C7/T1:   The spinal canal and foramina are widely patent. Impression  Significant improvement in nonenhancing intramedullary signal abnormality at the  upper cervical spinal cord/cervical medullary junction as discussed in detail  above. Improved/resolved cord expansion. IR Results (recent):  No results found for this or any previous visit. VAS/US Results (recent):  No results found for this or any previous visit. Reviewed records in [facility] and media tab today    Lab Review     No results displayed because visit has over 200 results. No results displayed because visit has over 200 results. Objective:       Exam:  Visit Vitals  /70   Ht 5' 3\" (1.6 m)   Wt 64.3 kg (141 lb 12.8 oz)   BMI 25.12 kg/m²     Gen: Awake, alert, follows commands  Appropriate appearance, normal speech. Oriented to all spheres. No visual field defect on confrontation exam.  Full eyes movement, with no nystagmus, no diplopia, no ptosis. Normal gag and swallow. All remaining cranial nerves were normal  Motor function: 5/5 in all extremities  Sensory: increase sensitivity to LT on the R face and R UE  DTRs ++ in all extremities, (-) Babinski  Good FTN and HTS   Gait: Normal    Assessment:       ICD-10-CM ICD-9-CM    1. 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 Reviewed medical records in Epic  2. Will add Cymbalta 60 mg QHS for pain  3. Continue Gabapentin 600 mg TID for now  4. Follow up with her rheumatologist      Follow-up and Dispositions    · Return in about 1 month (around 5/1/2022).                aKmron Garrido MD  Diplomate, American Board of Psychiatry and Neurology  Diplomate, Neuromuscular Medicine  Diplomate, American Board of Electrodiagnostic Medicine

## 2022-04-01 NOTE — LETTER
4/1/2022    Patient: Ken Ramos   YOB: 1985   Date of Visit: 4/1/2022     Mann Gauthier, [address] [address]  Via Fax: [phone]    Dear Mann Gauthier DO,      Thank you for referring Ms. Ken Ramos to [facility] for evaluation. My notes for this consultation are attached. If you have questions, please do not hesitate to call me. I look forward to following your patient along with you.       Sincerely,    Patrice Krishna MD

## 2022-04-04 DIAGNOSIS — G04.91 MYELITIS (HCC): Primary | ICD-10-CM

## 2022-04-04 LAB
BACTERIA SPEC CULT: NORMAL
SERVICE CMNT-IMP: NORMAL

## 2022-04-11 DIAGNOSIS — M79.2 NEUROPATHIC PAIN, ARM: ICD-10-CM

## 2022-04-11 DIAGNOSIS — R20.0 RIGHT FACIAL NUMBNESS: ICD-10-CM

## 2022-04-11 DIAGNOSIS — B34.9: ICD-10-CM

## 2022-04-11 DIAGNOSIS — M79.2 NEUROPATHIC PAIN: ICD-10-CM

## 2022-04-11 DIAGNOSIS — G37.3: ICD-10-CM

## 2022-04-11 RX ORDER — METHOCARBAMOL 500 MG/1
500 TABLET, FILM COATED ORAL 4 TIMES DAILY
Qty: 120 TABLET | Refills: 3 | Status: SHIPPED | OUTPATIENT
Start: 2022-04-11 | End: 2022-06-06

## 2022-04-11 RX ORDER — GABAPENTIN 300 MG/1
600 CAPSULE ORAL 3 TIMES DAILY
Qty: 180 CAPSULE | Refills: 3 | Status: SHIPPED | OUTPATIENT
Start: 2022-04-11 | End: 2022-08-09

## 2022-04-25 ENCOUNTER — OFFICE VISIT (OUTPATIENT)
Dept: NEUROLOGY | Age: 37
End: 2022-04-25
Payer: COMMERCIAL

## 2022-04-25 VITALS
HEART RATE: 123 BPM | WEIGHT: 139.6 LBS | DIASTOLIC BLOOD PRESSURE: 60 MMHG | HEIGHT: 64 IN | SYSTOLIC BLOOD PRESSURE: 110 MMHG | BODY MASS INDEX: 23.83 KG/M2 | OXYGEN SATURATION: 100 %

## 2022-04-25 DIAGNOSIS — G37.3 ACUTE TRANSVERSE MYELITIS (HCC): Primary | ICD-10-CM

## 2022-04-25 PROCEDURE — 99215 OFFICE O/P EST HI 40 MIN: CPT | Performed by: PSYCHIATRY & NEUROLOGY

## 2022-04-25 RX ORDER — DULOXETIN HYDROCHLORIDE 60 MG/1
60 CAPSULE, DELAYED RELEASE ORAL 2 TIMES DAILY
Qty: 180 CAPSULE | Refills: 3 | Status: SHIPPED | OUTPATIENT
Start: 2022-04-25

## 2022-04-25 NOTE — LETTER
4/25/2022    Patient: Joan Olson   YOB: 1985   Date of Visit: 4/25/2022     Sophia Grullon, [address] [place] [address]  Via Fax: [phone]    Dear Sophia Grullon DO,      Thank you for referring Ms. Joan Olson to [facility] for evaluation. My notes for this consultation are attached. If you have questions, please do not hesitate to call me. I look forward to following your patient along with you.       Sincerely,    Emely Wilson MD

## 2022-04-25 NOTE — PROGRESS NOTES
Neurology Progress Note    Patient ID:  Caryl Alfaro  [id number]  89 y.o.  1985      Subjective:   History:  Rafa Toledo is a female who  has a past medical history of Raynaud's disease and Rheumatoid arthritis (HCC). who was admitted 3/4/22 at [facility] for 2 weeks R neck pain. Eventually, patient developed numbness of the R side of face and R neck. (+) R UE weakness. MRI brain unremarkable. 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 Patient given SoluMedrol and ID Dr Suma Higgins consulted for (+) VZV     Still getting R neck pain described as \"swelling\" average 4/10 in intensity relieved with Robaxin and Gabapentin. Cymbalta 60 mg every day was added but no clear benefit.      Going to physical therapy. Seen rheumatologist but was not placed on RA meds due to not having joint pains    Patient is done seeing ID Dr Suma Higgins        ROS:  Per HPI-  Otherwise the remainder of ROS was negative    Social Hx  Social History     Socioeconomic History    Marital status:    Tobacco Use    Smoking status: Never Smoker    Smokeless tobacco: Never Used   Substance and Sexual Activity    Alcohol use: Not Currently    Drug use: Not Currently       Meds:  Current Outpatient Medications on File Prior to Visit   Medication Sig Dispense Refill    gabapentin (NEURONTIN) 300 mg capsule Take 2 Capsules by mouth three (3) times daily for 120 days. Max Daily Amount: 1,800 mg. 180 Capsule 3    methocarbamoL (ROBAXIN) 500 mg tablet Take 1 Tablet by mouth four (4) times daily for 120 days. 120 Tablet 3    oxyCODONE IR (ROXICODONE) 5 mg immediate release tablet TAKE 1 TABLET BY MOUTH EVERY 6 HOURS AS NEEDED FOR 7 DAYS       No current facility-administered medications on file prior to visit.        Imaging:    CT Results (recent):  Results from Hospital Encounter encounter on 03/18/22    CTA CHEST W OR W WO CONT    Narrative  EXAM: CTA CHEST W OR W WO CONT    INDICATION: SOB    COMPARISON: None. CONTRAST: 80 mL of Isovue-370. TECHNIQUE:  Precontrast  images were obtained to localize the volume for acquisition. Multislice helical CT arteriography was performed from the diaphragm to the  thoracic inlet during uneventful rapid bolus intravenous contrast  administration. Lung and soft tissue windows were generated. Coronal and  sagittal images were generated and 3D post processing consisting of coronal  maximum intensity images was performed. CT dose reduction was achieved through  use of a standardized protocol tailored for this examination and automatic  exposure control for dose modulation. FINDINGS:  THYROID: No nodule. MEDIASTINUM: No mass or lymphadenopathy. Residual thymic tissue is seen in the  anterior mediastinum. RIA: Subcentimeter lymph nodes in both ria are likely reactive. THORACIC AORTA: No dissection or aneurysm. PULMONARY ARTERIES: Normal in caliber. The pulmonary arteries are well enhanced. No evidence of pulmonary embolus. TRACHEA/BRONCHI: Patent. ESOPHAGUS: No wall thickening or dilatation. HEART: Normal in size. PLEURA: No effusion or pneumothorax. LUNGS: No nodule, mass, or airspace disease. Mild atelectasis is seen in the  lung bases. INCIDENTALLY IMAGED UPPER ABDOMEN: No focal abnormality. BONES: No destructive bone lesion. Impression  1. Negative for pulmonary embolus. 2. Mild atelectasis in the lung bases. Clement Fly MRI Results (recent):  Results from [place] encounter on 03/18/22    MRI CERV SPINE W WO CONT    Narrative  INDICATION:  Meningoencephalitis    COMPARISON: March 3, 2022    TECHNIQUE: Multiplanar multisequence acquisition of the cervical spine. CONTRAST: With and without IV contrast    FINDINGS:    ALIGNMENT:  Kyphosis at C4-5. VERTEBRAL BODIES:  No compression fracture. MARROW SIGNAL: No significant edema.   SPINAL CORD:  Significant interval improvement in previous extensive  nonenhancing intramedullary signal abnormality extending from the dorsal medulla  to the C5 level; now, signal abnormality only extends from the inferior medulla  to the mid C2 level. Previous mild expansion of the cervical spinal cord has  also improved. No areas of abnormal intramedullary enhancement. ADDITIONAL COMMENTS: N/A.    C2/3:   The spinal canal and foramina are widely patent. C3/4:   The spinal canal and foramina are widely patent. C4/5:  Mild disc bulge though without significant spinal canal or foraminal  stenosis. C5/6:  Posterior disc osteophyte complex, minimal if any spinal canal stenosis. Foramina are patent. C6/7:  Posterior disc osteophyte complex, minimal if any spinal canal stenosis. Foramina are patent. C7/T1:   The spinal canal and foramina are widely patent. Impression  Significant improvement in nonenhancing intramedullary signal abnormality at the  upper cervical spinal cord/cervical medullary junction as discussed in detail  above. Improved/resolved cord expansion. IR Results (recent):  No results found for this or any previous visit. VAS/US Results (recent):  No results found for this or any previous visit. Reviewed records in [employer] and [employer] tab today    Lab Review     No results displayed because visit has over 200 results. No results displayed because visit has over 200 results. Objective:       Exam:  Visit Vitals  /60   Pulse (!) 123   Ht 5' 4\" (1.626 m)   Wt 63.3 kg (139 lb 9.6 oz)   SpO2 100%   BMI 23.96 kg/m²     Gen: Awake, alert, follows commands  Appropriate appearance, normal speech. Oriented to all spheres. No visual field defect on confrontation exam.  Full eyes movement, with no nystagmus, no diplopia, no ptosis. Normal gag and swallow.   All remaining cranial nerves were normal  Motor function: 5/5 in all extremities  Sensory: increase sensitivity to LT on the R face and R UE  DTRs ++ in all extremities, (-) Babinski  Good FTN and HTS   Gait: Normal    Assessment:       ICD-10-CM ICD-9-CM    1. 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 Due to persistent R occipital and R neck pain, will order MRI cervical spine with JONA to look for interval worsening of longitudinal myelitis  2. Increase Cymbalta to 60 mg BID  3. Continue Gabapentin 600 mg TID for now  4. Continue Robaxin 500 mg QHS and prn  5. Follow up with her rheumatologist  6. Depending on above, will consider IV steroid       Follow-up and Dispositions    · Return in about 1 month (around 5/25/2022).                Alma Manrique MD  Diplomate, American Board of Psychiatry and Neurology  Diplomate, Neuromuscular Medicine  Diplomate, American Board of Electrodiagnostic Medicine

## 2022-05-02 ENCOUNTER — HOSPITAL ENCOUNTER (OUTPATIENT)
Dept: MRI IMAGING | Age: 37
Discharge: HOME | End: 2022-05-02
Attending: PSYCHIATRY & NEUROLOGY
Payer: COMMERCIAL

## 2022-05-02 VITALS — WEIGHT: 140 LBS | BODY MASS INDEX: 24.03 KG/M2

## 2022-05-02 DIAGNOSIS — G37.3 ACUTE TRANSVERSE MYELITIS (HCC): ICD-10-CM

## 2022-05-02 PROCEDURE — 74011250636 HC RX REV CODE- 250/636: Performed by: PSYCHIATRY & NEUROLOGY

## 2022-05-02 PROCEDURE — A9576 INJ PROHANCE MULTIPACK: HCPCS | Performed by: PSYCHIATRY & NEUROLOGY

## 2022-05-02 PROCEDURE — 72156 MRI NECK SPINE W/O & W/DYE: CPT

## 2022-05-02 RX ADMIN — GADOTERIDOL 13 ML: 279.3 INJECTION, SOLUTION INTRAVENOUS at 09:57

## 2022-05-09 ENCOUNTER — TELEPHONE (OUTPATIENT)
Dept: NEUROLOGY | Age: 37
End: 2022-05-09

## 2022-05-09 NOTE — TELEPHONE ENCOUNTER
----- Message from Chi Fischer MD sent at 5/9/2022  1:09 PM EDT -----  Pls inform patient MRI cervical spine shows decrease in size of lesion in spine  EF  ----- Message -----  From: Esteban Crystal Results In  Sent: 5/2/2022   3:39 PM EDT  To: Chi Fischer MD

## 2022-05-16 ENCOUNTER — TELEPHONE (OUTPATIENT)
Dept: FAMILY MEDICINE CLINIC | Age: 37
End: 2022-05-16

## 2022-05-16 NOTE — TELEPHONE ENCOUNTER
Patients PCP believes that her viral meningitis is coming back, and wants her to be seen by Dr Marianne Rhodes. Please call her back to schedule an Appointment if the Dr will see her.

## 2022-05-17 ENCOUNTER — APPOINTMENT (OUTPATIENT)
Dept: MRI IMAGING | Age: 37
End: 2022-05-17
Attending: EMERGENCY MEDICINE
Payer: COMMERCIAL

## 2022-05-17 ENCOUNTER — HOSPITAL ENCOUNTER (EMERGENCY)
Age: 37
Discharge: HOME | End: 2022-05-17
Attending: EMERGENCY MEDICINE
Payer: COMMERCIAL

## 2022-05-17 VITALS
DIASTOLIC BLOOD PRESSURE: 74 MMHG | RESPIRATION RATE: 16 BRPM | WEIGHT: 143 LBS | BODY MASS INDEX: 25.34 KG/M2 | TEMPERATURE: 98.5 F | OXYGEN SATURATION: 97 % | HEIGHT: 63 IN | HEART RATE: 107 BPM | SYSTOLIC BLOOD PRESSURE: 114 MMHG

## 2022-05-17 DIAGNOSIS — M54.2 NECK PAIN ON RIGHT SIDE: Primary | ICD-10-CM

## 2022-05-17 LAB
ALBUMIN SERPL-MCNC: 2.5 G/DL (ref 3.5–5)
ALBUMIN/GLOB SERPL: 0.5 {RATIO} (ref 1.1–2.2)
ALP SERPL-CCNC: 47 U/L (ref 45–117)
ALT SERPL-CCNC: 172 U/L (ref 12–78)
ANION GAP SERPL CALC-SCNC: 4 MMOL/L (ref 5–15)
AST SERPL-CCNC: 178 U/L (ref 15–37)
BASOPHILS # BLD: 0.1 K/UL (ref 0–0.1)
BASOPHILS NFR BLD: 1 % (ref 0–1)
BILIRUB SERPL-MCNC: 0.5 MG/DL (ref 0.2–1)
BUN SERPL-MCNC: 6 MG/DL (ref 6–20)
BUN/CREAT SERPL: 10 (ref 12–20)
CALCIUM SERPL-MCNC: 8.5 MG/DL (ref 8.5–10.1)
CHLORIDE SERPL-SCNC: 104 MMOL/L (ref 97–108)
CO2 SERPL-SCNC: 27 MMOL/L (ref 21–32)
COMMENT, HOLDF: NORMAL
CREAT SERPL-MCNC: 0.61 MG/DL (ref 0.55–1.02)
DIFFERENTIAL METHOD BLD: ABNORMAL
EOSINOPHIL # BLD: 0.7 K/UL (ref 0–0.4)
EOSINOPHIL NFR BLD: 7 % (ref 0–7)
ERYTHROCYTE [DISTWIDTH] IN BLOOD BY AUTOMATED COUNT: 19.2 % (ref 11.5–14.5)
ERYTHROCYTE [SEDIMENTATION RATE] IN BLOOD: 65 MM/HR (ref 0–20)
GLOBULIN SER CALC-MCNC: 4.9 G/DL (ref 2–4)
GLUCOSE SERPL-MCNC: 90 MG/DL (ref 65–100)
HCT VFR BLD AUTO: 35.7 % (ref 35–47)
HGB BLD-MCNC: 10.7 G/DL (ref 11.5–16)
IMM GRANULOCYTES # BLD AUTO: 0 K/UL (ref 0–0.04)
IMM GRANULOCYTES NFR BLD AUTO: 0 % (ref 0–0.5)
LYMPHOCYTES # BLD: 1.1 K/UL (ref 0.8–3.5)
LYMPHOCYTES NFR BLD: 10 % (ref 12–49)
MCH RBC QN AUTO: 26.9 PG (ref 26–34)
MCHC RBC AUTO-ENTMCNC: 30 G/DL (ref 30–36.5)
MCV RBC AUTO: 89.7 FL (ref 80–99)
MONOCYTES # BLD: 0.7 K/UL (ref 0–1)
MONOCYTES NFR BLD: 6 % (ref 5–13)
NEUTS SEG # BLD: 8.5 K/UL (ref 1.8–8)
NEUTS SEG NFR BLD: 76 % (ref 32–75)
NRBC # BLD: 0 K/UL (ref 0–0.01)
NRBC BLD-RTO: 0 PER 100 WBC
PLATELET # BLD AUTO: 401 K/UL (ref 150–400)
PMV BLD AUTO: 8.7 FL (ref 8.9–12.9)
POTASSIUM SERPL-SCNC: 4.3 MMOL/L (ref 3.5–5.1)
PROT SERPL-MCNC: 7.4 G/DL (ref 6.4–8.2)
RBC # BLD AUTO: 3.98 M/UL (ref 3.8–5.2)
SAMPLES BEING HELD,HOLD: NORMAL
SODIUM SERPL-SCNC: 135 MMOL/L (ref 136–145)
WBC # BLD AUTO: 11.1 K/UL (ref 3.6–11)

## 2022-05-17 PROCEDURE — 85652 RBC SED RATE AUTOMATED: CPT

## 2022-05-17 PROCEDURE — 99285 EMERGENCY DEPT VISIT HI MDM: CPT

## 2022-05-17 PROCEDURE — 85025 COMPLETE CBC W/AUTO DIFF WBC: CPT

## 2022-05-17 PROCEDURE — A9576 INJ PROHANCE MULTIPACK: HCPCS | Performed by: RADIOLOGY

## 2022-05-17 PROCEDURE — 74011250637 HC RX REV CODE- 250/637: Performed by: EMERGENCY MEDICINE

## 2022-05-17 PROCEDURE — 80053 COMPREHEN METABOLIC PANEL: CPT

## 2022-05-17 PROCEDURE — 70553 MRI BRAIN STEM W/O & W/DYE: CPT

## 2022-05-17 PROCEDURE — 36415 COLL VENOUS BLD VENIPUNCTURE: CPT

## 2022-05-17 PROCEDURE — 96374 THER/PROPH/DIAG INJ IV PUSH: CPT

## 2022-05-17 PROCEDURE — 74011250636 HC RX REV CODE- 250/636: Performed by: RADIOLOGY

## 2022-05-17 PROCEDURE — 72156 MRI NECK SPINE W/O & W/DYE: CPT

## 2022-05-17 PROCEDURE — 74011250636 HC RX REV CODE- 250/636: Performed by: EMERGENCY MEDICINE

## 2022-05-17 RX ORDER — KETOROLAC TROMETHAMINE 30 MG/ML
15 INJECTION, SOLUTION INTRAMUSCULAR; INTRAVENOUS
Status: COMPLETED | OUTPATIENT
Start: 2022-05-17 | End: 2022-05-17

## 2022-05-17 RX ORDER — OXYCODONE AND ACETAMINOPHEN 5; 325 MG/1; MG/1
1 TABLET ORAL
Status: COMPLETED | OUTPATIENT
Start: 2022-05-17 | End: 2022-05-17

## 2022-05-17 RX ORDER — OXYCODONE AND ACETAMINOPHEN 5; 325 MG/1; MG/1
1 TABLET ORAL
Qty: 12 TABLET | Refills: 0 | Status: SHIPPED | OUTPATIENT
Start: 2022-05-17 | End: 2022-05-20

## 2022-05-17 RX ADMIN — SODIUM CHLORIDE 1000 ML: 9 INJECTION, SOLUTION INTRAVENOUS at 16:25

## 2022-05-17 RX ADMIN — OXYCODONE HYDROCHLORIDE AND ACETAMINOPHEN 1 TABLET: 5; 325 TABLET ORAL at 20:30

## 2022-05-17 RX ADMIN — GADOTERIDOL 12 ML: 279.3 INJECTION, SOLUTION INTRAVENOUS at 17:23

## 2022-05-17 RX ADMIN — KETOROLAC TROMETHAMINE 15 MG: 30 INJECTION, SOLUTION INTRAMUSCULAR at 16:23

## 2022-05-17 NOTE — ED TRIAGE NOTES
Patient reports she started with right sided neck pain on Saturday- reports she is concerned because she had meningitis in the past-    Patient denies any fevers at home

## 2022-05-17 NOTE — ED PROVIDER NOTES
29-year-old female presents from home with a complaint of right-sided neck pain and headache. Symptoms were present when she woke up this morning. Pain worsened with movement of her head. She took her pain medications which were prescribed with little relief in her symptoms. Patient states that this is similar to how she felt in the beginning of March when she was found to have varicella meningitis. Patient called both her PCP and ID doctors who referred her to the emergency department for further evaluation. Patient denies any current rash or fever. No chest pain or shortness of breath. No cough or urinary symptoms. Patient also has a past medical history of rheumatoid arthritis and is considered immunocompromised. She completed 1 month course of acyclovir via PICC line. Past Medical History:   Diagnosis Date    History of vascular access device 03/08/2022    [facility] VAT: 4 FR Single lumen PICC Right brachial for LT ABX therapy; Length 35 CM Arm circ 31 CM. Verified with Max P @ 35 CM    Raynaud's disease     Rheumatoid arthritis ([facility] [address]        Past Surgical History:   Procedure Laterality Date    HX GYN      hystorectomy         No family history on file.     Social History     Socioeconomic History    Marital status:      Spouse name: Not on file    Number of children: Not on file    Years of education: Not on file    Highest education level: Not on file   Occupational History    Not on file   Tobacco Use    Smoking status: Never Smoker    Smokeless tobacco: Never Used   Substance and Sexual Activity    Alcohol use: Not Currently    Drug use: Not Currently    Sexual activity: Not on file   Other Topics Concern     Service Not Asked    Blood Transfusions Not Asked    Caffeine Concern Not Asked    Occupational Exposure Not Asked    Hobby Hazards Not Asked    Sleep Concern Not Asked    Stress Concern Not Asked    Weight Concern Not Asked    Special Diet Not Asked    Back Care Not Asked    Exercise Not Asked    Bike Helmet Not Asked   [address] Not Asked    Self-Exams Not Asked   Social History Narrative    Not on file     Social Determinants of Health     Financial Resource Strain:     Difficulty of Paying Living Expenses: Not on file   Food Insecurity:     Worried About Running Out of Food in the Last Year: Not on file    Vick of Food in the Last Year: Not on file   Transportation Needs:     Lack of Transportation (Medical): Not on file    Lack of Transportation (Non-Medical): Not on file   Physical Activity:     Days of Exercise per Week: Not on file    Minutes of Exercise per Session: Not on file   Stress:     Feeling of Stress : Not on file   Social Connections:     Frequency of Communication with Friends and Family: Not on file    Frequency of Social Gatherings with Friends and Family: Not on file    Attends Hindu Services: Not on file    Active Member of [address] [place] [address] or Organizations: Not on file    Attends Club or Organization Meetings: Not on file    Marital Status: Not on file   Intimate Partner Violence:     Fear of Current or Ex-Partner: Not on file    Emotionally Abused: Not on file    Physically Abused: Not on file    Sexually Abused: Not on file   Housing Stability:     Unable to Pay for Housing in the Last Year: Not on file    Number of [place] in the Last Year: Not on file    Unstable Housing in the Last Year: Not on file         ALLERGIES: Patient has no known allergies. Review of Systems   Constitutional: Negative for fever. HENT: Negative for facial swelling. Eyes: Negative for visual disturbance. Respiratory: Negative for chest tightness. Cardiovascular: Negative for chest pain. Gastrointestinal: Negative for abdominal pain. Genitourinary: Negative for difficulty urinating and dysuria. Musculoskeletal: Negative for arthralgias. Skin: Negative for rash. Neurological: Positive for headaches. Hematological: Negative for adenopathy. Psychiatric/Behavioral: Negative for suicidal ideas. Vitals:    05/17/22 1604   BP: 125/88   Pulse: (!) 117   Resp: 16   Temp: 99.5 °F (37.5 °C)   SpO2: 100%   Weight: 64.9 kg (143 lb)   Height: 5' 3\" (1.6 m)            Physical Exam  Vitals and nursing note reviewed. Constitutional:       General: She is not in acute distress. Appearance: She is well-developed. HENT:      Head: Normocephalic and atraumatic. Eyes:      General: No scleral icterus. Conjunctiva/sclera: Conjunctivae normal.      Pupils: Pupils are equal, round, and reactive to light. Cardiovascular:      Rate and Rhythm: Tachycardia present. Heart sounds: No murmur heard. Pulmonary:      Effort: Pulmonary effort is normal. No respiratory distress. Abdominal:      General: There is no distension. Musculoskeletal:         General: Normal range of motion. Cervical back: Normal range of motion and neck supple. Skin:     General: Skin is warm and dry. Findings: No rash. Neurological:      Mental Status: She is alert and oriented to person, place, and time.           MDM       Procedures

## 2022-05-17 NOTE — TELEPHONE ENCOUNTER
Pt called to back to schedule appointment with Dr. Gricelda Aguirre for possible recurrence of viral meningitis due to severe neck pain, can barely lift her head and was advised by PCP to call Dr. Gricelda Aguirre. Pt agrees to go to ER per Dr. Gricelda Aguirre.  Jihan

## 2022-06-06 ENCOUNTER — OFFICE VISIT (OUTPATIENT)
Dept: NEUROLOGY | Age: 37
End: 2022-06-06
Payer: COMMERCIAL

## 2022-06-06 VITALS
HEART RATE: 111 BPM | DIASTOLIC BLOOD PRESSURE: 70 MMHG | HEIGHT: 63 IN | OXYGEN SATURATION: 100 % | SYSTOLIC BLOOD PRESSURE: 120 MMHG | BODY MASS INDEX: 25.71 KG/M2 | WEIGHT: 145.1 LBS

## 2022-06-06 DIAGNOSIS — G37.3 ACUTE TRANSVERSE MYELITIS (HCC): Primary | ICD-10-CM

## 2022-06-06 PROCEDURE — 99215 OFFICE O/P EST HI 40 MIN: CPT | Performed by: PSYCHIATRY & NEUROLOGY

## 2022-06-06 RX ORDER — TIZANIDINE 4 MG/1
4 TABLET ORAL
Qty: 30 TABLET | Refills: 3 | Status: SHIPPED | OUTPATIENT
Start: 2022-06-06 | End: 2022-09-08

## 2022-06-06 RX ORDER — PREDNISONE 5 MG/1
TABLET ORAL
COMMUNITY
Start: 2022-05-31

## 2022-06-06 RX ORDER — AZATHIOPRINE 50 MG/1
TABLET ORAL
COMMUNITY
Start: 2022-05-26 | End: 2022-08-23

## 2022-06-06 NOTE — PROGRESS NOTES
Neurology Progress Note    Patient ID:  Gonzalez Hopson  [id number]  33 y.o.  1985      Subjective:   History:  Josey Toledo is a female who  has a past medical history of Raynaud's disease and Rheumatoid arthritis ([address]  who was admitted 3/4/22 at [facility] for 2 weeks R neck pain. Eventually, patient developed numbness of the R side of face and R neck. (+) R UE weakness. MRI brain unremarkable. 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 Patient given SoluMedrol and ID Dr Varghese Nix consulted for (+) VZV. Patient saw rheumatologist Dr Roverto Cervantes and was placed on steroid with decreased neck pain. Robaxin not working anymore (but maybe a manufacturing issue since the color of the pill as different). Still on Gabapentin 600 mg TID and Cymbalta 60 mg BID      Recent tests done:  MRI cervical spine (5/17/22): Continued improvement in abnormal cord signal at the cervical  medullary junction. No evidence of enhancement       ROS:  Per HPI-  Otherwise the remainder of ROS was negative    Social Hx  Social History     Socioeconomic History    Marital status:    Tobacco Use    Smoking status: Never Smoker    Smokeless tobacco: Never Used   Substance and Sexual Activity    Alcohol use: Not Currently    Drug use: Not Currently       Meds:  Current Outpatient Medications on File Prior to Visit   Medication Sig Dispense Refill    predniSONE (DELTASONE) 5 mg tablet       azaTHIOprine (IMURAN) 50 mg tablet PLEASE SEE ATTACHED FOR DETAILED DIRECTIONS      DULoxetine (CYMBALTA) 60 mg capsule Take 1 Capsule by mouth two (2) times a day. 180 Capsule 3    gabapentin (NEURONTIN) 300 mg capsule Take 2 Capsules by mouth three (3) times daily for 120 days. Max Daily Amount: 1,800 mg. 180 Capsule 3    methocarbamoL (ROBAXIN) 500 mg tablet Take 1 Tablet by mouth four (4) times daily for 120 days.  120 Tablet 3    oxyCODONE IR (ROXICODONE) 5 mg immediate release tablet TAKE 1 TABLET BY MOUTH EVERY 6 HOURS AS NEEDED FOR 7 DAYS       No current facility-administered medications on file prior to visit. Imaging:    CT Results (recent):  Results from Hospital Encounter encounter on 03/18/22    CTA CHEST W OR W WO CONT    Narrative  EXAM: CTA CHEST W OR W WO CONT    INDICATION: SOB    COMPARISON: None. CONTRAST: 80 mL of Isovue-370. TECHNIQUE:  Precontrast  images were obtained to localize the volume for acquisition. Multislice helical CT arteriography was performed from the diaphragm to the  thoracic inlet during uneventful rapid bolus intravenous contrast  administration. Lung and soft tissue windows were generated. Coronal and  sagittal images were generated and 3D post processing consisting of coronal  maximum intensity images was performed. CT dose reduction was achieved through  use of a standardized protocol tailored for this examination and automatic  exposure control for dose modulation. FINDINGS:  THYROID: No nodule. MEDIASTINUM: No mass or lymphadenopathy. Residual thymic tissue is seen in the  anterior mediastinum. RIA: Subcentimeter lymph nodes in both ria are likely reactive. THORACIC AORTA: No dissection or aneurysm. PULMONARY ARTERIES: Normal in caliber. The pulmonary arteries are well enhanced. No evidence of pulmonary embolus. TRACHEA/BRONCHI: Patent. ESOPHAGUS: No wall thickening or dilatation. HEART: Normal in size. PLEURA: No effusion or pneumothorax. LUNGS: No nodule, mass, or airspace disease. Mild atelectasis is seen in the  lung bases. INCIDENTALLY IMAGED UPPER ABDOMEN: No focal abnormality. BONES: No destructive bone lesion. Impression  1. Negative for pulmonary embolus. 2. Mild atelectasis in the lung bases. Yadiel Pendleton       MRI Results (recent):  Results from [place] encounter on 05/17/22    MRI CERV SPINE W WO CONT    Narrative  *PRELIMINARY REPORT*    Persistent signal abnormality in the cord at the level of the C1 arch  No new finding    Preliminary report was provided by Dr. Karen Luna, the on-call radiologist, at [address]  hours    Final report to follow. *END PRELIMINARY REPORT    Procedure: MRI of the cervical spine with and without contrast    History: History of Varicella meningitis    TECHNIQUE: Pre and postcontrast MRI of the cervical spine was performed after  the administration of 12 mL of ProHance    FINDINGS: Vertebral body heights and alignment are within normal limits. Focal  T2 hyperintensities noted in the spinal cord at the C1 level. There is no  evidence of enhancement on postcontrast images. This is decreased in size. No new areas of abnormal cord signal are present. C2-3 is unremarkable. C3-4 is unremarkable. C4-5 demonstrates minimal posterior disc osteophyte complex without canal  stenosis or foraminal narrowing. C5-6 is unremarkable. C6-7 is unremarkable. C7-T1 is unremarkable. Incidental soft tissue imaging is unremarkable. Impression  Continued improvement in abnormal cord signal at the cervical  medullary junction. No evidence of enhancement      IR Results (recent):  No results found for this or any previous visit. VAS/US Results (recent):  No results found for this or any previous visit.       Reviewed records in [employer] tab today    Lab Review     Admission on 05/17/2022, Discharged on 05/17/2022   Component Date Value Ref Range Status    WBC 05/17/2022 11.1* 3.6 - 11.0 K/uL Final    RBC 05/17/2022 3.98  3.80 - 5.20 M/uL Final    HGB 05/17/2022 10.7* 11.5 - 16.0 g/dL Final    HCT 05/17/2022 35.7  35.0 - 47.0 % Final    MCV 05/17/2022 89.7  80.0 - 99.0 FL Final    MCH 05/17/2022 26.9  26.0 - 34.0 PG Final    MCHC 05/17/2022 30.0  30.0 - 36.5 g/dL Final    RDW 05/17/2022 19.2* 11.5 - 14.5 % Final    PLATELET 02/21/5977 648* 150 - 400 K/uL Final    MPV 05/17/2022 8.7* 8.9 - 12.9 FL Final    NRBC 05/17/2022 0.0  0  WBC Final    ABSOLUTE NRBC 05/17/2022 0.00  0.00 - 0.01 K/uL Final    NEUTROPHILS 05/17/2022 76* 32 - 75 % Final    LYMPHOCYTES 05/17/2022 10* 12 - 49 % Final    MONOCYTES 05/17/2022 6  5 - 13 % Final    EOSINOPHILS 05/17/2022 7  0 - 7 % Final    BASOPHILS 05/17/2022 1  0 - 1 % Final    IMMATURE GRANULOCYTES 05/17/2022 0  0.0 - 0.5 % Final    ABS. NEUTROPHILS 05/17/2022 8.5* 1.8 - 8.0 K/UL Final    ABS. LYMPHOCYTES 05/17/2022 1.1  0.8 - 3.5 K/UL Final    ABS. MONOCYTES 05/17/2022 0.7  0.0 - 1.0 K/UL Final    ABS. EOSINOPHILS 05/17/2022 0.7* 0.0 - 0.4 K/UL Final    ABS. BASOPHILS 05/17/2022 0.1  0.0 - 0.1 K/UL Final    ABS. IMM. GRANS. 05/17/2022 0.0  0.00 - 0.04 K/UL Final    DF 05/17/2022 AUTOMATED    Final    Sodium 05/17/2022 135* 136 - 145 mmol/L Final    Potassium 05/17/2022 4.3  3.5 - 5.1 mmol/L Final    SPECIMEN HEMOLYZED, RESULTS MAY BE AFFECTED    Chloride 05/17/2022 104  97 - 108 mmol/L Final    CO2 05/17/2022 27  21 - 32 mmol/L Final    Anion gap 05/17/2022 4* 5 - 15 mmol/L Final    Glucose 05/17/2022 90  65 - 100 mg/dL Final    BUN 05/17/2022 6  6 - 20 MG/DL Final    Creatinine 05/17/2022 0.61  0.55 - 1.02 MG/DL Final    BUN/Creatinine ratio 05/17/2022 10* 12 - 20   Final    GFR est AA 05/17/2022 >60  >60 ml/min/1.73m2 Final    GFR est non-AA 05/17/2022 >60  >60 ml/min/1.73m2 Final    Estimated GFR is calculated using the IDMS-traceable Modification of Diet in Renal Disease (MDRD) Study equation, reported for both  Americans (GFRAA) and non- Americans (GFRNA), and normalized to 1.73m2 body surface area. The physician must decide which value applies to the patient.  Calcium 05/17/2022 8.5  8.5 - 10.1 MG/DL Final    Bilirubin, total 05/17/2022 0.5  0.2 - 1.0 MG/DL Final    ALT (SGPT) 05/17/2022 172* 12 - 78 U/L Final    AST (SGOT) 05/17/2022 178* 15 - 37 U/L Final    SPECIMEN HEMOLYZED, RESULTS MAY BE AFFECTED    Alk.  phosphatase 05/17/2022 47  45 - 117 U/L Final  Protein, total 05/17/2022 7.4  6.4 - 8.2 g/dL Final    Albumin 05/17/2022 2.5* 3.5 - 5.0 g/dL Final    Globulin 05/17/2022 4.9* 2.0 - 4.0 g/dL Final    A-G Ratio 05/17/2022 0.5* 1.1 - 2.2   Final    Sed rate, automated 05/17/2022 65* 0 - 20 mm/hr Final    SAMPLES BEING HELD 05/17/2022 1SST,1RED   Final    COMMENT 05/17/2022 Add-on orders for these samples will be processed based on acceptable specimen integrity and analyte stability, which may vary by analyte. Final   No results displayed because visit has over 200 results. No results displayed because visit has over 200 results. Objective:       Exam:  Visit Vitals  /70   Pulse (!) 111   Ht 5' 3\" (1.6 m)   Wt 65.8 kg (145 lb 1.6 oz)   SpO2 100%   BMI 25.70 kg/m²     Gen: Awake, alert, follows commands  Appropriate appearance, normal speech. Oriented to all spheres. No visual field defect on confrontation exam.  Full eyes movement, with no nystagmus, no diplopia, no ptosis. Normal gag and swallow. All remaining cranial nerves were normal  Motor function: 5/5 in all extremities  Sensory: intact to LT, PP and JPS  Good FTN and HTS   Gait: Normal    Assessment:       ICD-10-CM ICD-9-CM    1. 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 No evidence of enhancement    R neck pain, possible related to rheumatoid arthritis at this point      Plan:   1. I personally reviewed the MRI cervical spine images with the patient. Discussed it shows continued improvement which does not correlate with severity of her neck pain and so possibly current neck issue is rheumatologic in nature (seem to be responding to steroids)  2. Switch to Tizanidine 4 mg QHS  3. Continue Cymbalta to 60 mg BID  4. Continue Gabapentin 600 mg TID for now  5.  Follow up with her rheumatologist regarding steroid treatment        Follow-up and Dispositions    · Return if symptoms worsen or fail to improve.                Carmina Ríos MD  Diplomate, American Board of Psychiatry and Neurology  Diplomate, Neuromuscular Medicine  Diplomate, American Board of Electrodiagnostic Medicine

## 2022-06-06 NOTE — LETTER
6/6/2022    Patient: Allyson Barba   YOB: 1985   Date of Visit: 6/6/2022     Polly Florentino, [address] [address]  Via Fax: [phone]    Dear Polly Florentino, DO,      Thank you for referring Ms. Allyson Barba to [facility] for evaluation. My notes for this consultation are attached. If you have questions, please do not hesitate to call me. I look forward to following your patient along with you.       Sincerely,    Alma Manrique MD

## 2022-08-22 NOTE — PROGRESS NOTES
Neurology Progress Note    Patient ID:  Silvia Jacobson  [id number]  41 y.o.  1985      Subjective:   History:  Silvia Jacobson is a female who  has a past medical history of Raynaud's disease and Rheumatoid arthritis ([facility] [address] who was admitted 3/4/22 at [facility] for 2 weeks R neck pain. Eventually, patient developed numbness of the R side of face and R neck. (+) R UE weakness. MRI brain unremarkable. 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 Patient given SoluMedrol and ID Dr Bigg Walker consulted for (+) VZV. Patient saw rheumatologist Dr Jeb Rhodes and was placed on steroid with decreased neck pain. Robaxin did not work. Last time, patient was admitted at [facility] for pneumonia and diagnosed with pulmonary fibrosis and placed on Prednisone. Now on on portable oxygen. Patient noted worsening severe pain on the R occipital area, 10/10, burning throbbing and burning since then. Still on Gabapentin 600 mg TID and Cymbalta 60 mg BID. Switch to TIzanidine 4 mg QHS with benefit. Patient going to start [facility] c/o rheumatologist.        Recent tests done:  MRI cervical spine (5/17/22): Continued improvement in abnormal cord signal at the cervical medullary junction. No evidence of enhancement         ROS:  Per HPI-  Otherwise the remainder of ROS was negative    Social Hx  Social History     Socioeconomic History    Marital status:    Tobacco Use    Smoking status: Never    Smokeless tobacco: Never   Substance and Sexual Activity    Alcohol use: Not Currently    Drug use: Not Currently       Meds:  Current Outpatient Medications on File Prior to Visit   Medication Sig Dispense Refill    predniSONE (DELTASONE) 5 mg tablet       tiZANidine (ZANAFLEX) 4 mg tablet Take 1 Tablet by mouth nightly. 30 Tablet 3    DULoxetine (CYMBALTA) 60 mg capsule Take 1 Capsule by mouth two (2) times a day.  180 Capsule 3    azaTHIOprine (IMURAN) 50 mg tablet PLEASE SEE ATTACHED FOR DETAILED DIRECTIONS (Patient not taking: Reported on 8/23/2022)      oxyCODONE IR (ROXICODONE) 5 mg immediate release tablet TAKE 1 TABLET BY MOUTH EVERY 6 HOURS AS NEEDED FOR 7 DAYS (Patient not taking: Reported on 8/23/2022)       No current facility-administered medications on file prior to visit. Imaging:    CT Results (recent):  Results from Hospital Encounter encounter on 03/18/22    CTA CHEST W OR W WO CONT    Narrative  EXAM: CTA CHEST W OR W WO CONT    INDICATION: SOB    COMPARISON: None. CONTRAST: 80 mL of Isovue-370. TECHNIQUE:  Precontrast  images were obtained to localize the volume for acquisition. Multislice helical CT arteriography was performed from the diaphragm to the  thoracic inlet during uneventful rapid bolus intravenous contrast  administration. Lung and soft tissue windows were generated. Coronal and  sagittal images were generated and 3D post processing consisting of coronal  maximum intensity images was performed. CT dose reduction was achieved through  use of a standardized protocol tailored for this examination and automatic  exposure control for dose modulation. FINDINGS:  THYROID: No nodule. MEDIASTINUM: No mass or lymphadenopathy. Residual thymic tissue is seen in the  anterior mediastinum. RIA: Subcentimeter lymph nodes in both ria are likely reactive. THORACIC AORTA: No dissection or aneurysm. PULMONARY ARTERIES: Normal in caliber. The pulmonary arteries are well enhanced. No evidence of pulmonary embolus. TRACHEA/BRONCHI: Patent. ESOPHAGUS: No wall thickening or dilatation. HEART: Normal in size. PLEURA: No effusion or pneumothorax. LUNGS: No nodule, mass, or airspace disease. Mild atelectasis is seen in the  lung bases. INCIDENTALLY IMAGED UPPER ABDOMEN: No focal abnormality. BONES: No destructive bone lesion. Impression  1. Negative for pulmonary embolus. 2. Mild atelectasis in the lung bases. Benny Ra       MRI Results (recent):  Results from [place] encounter on 05/17/22    MRI CERV SPINE W WO CONT    Narrative  *PRELIMINARY REPORT*    Persistent signal abnormality in the cord at the level of the C1 arch  No new finding    Preliminary report was provided by Dr. Araceli Burrell, the on-call radiologist, at [address]  hours    Final report to follow. *END PRELIMINARY REPORT    Procedure: MRI of the cervical spine with and without contrast    History: History of Varicella meningitis    TECHNIQUE: Pre and postcontrast MRI of the cervical spine was performed after  the administration of 12 mL of ProHance    FINDINGS: Vertebral body heights and alignment are within normal limits. Focal  T2 hyperintensities noted in the spinal cord at the C1 level. There is no  evidence of enhancement on postcontrast images. This is decreased in size. No new areas of abnormal cord signal are present. C2-3 is unremarkable. C3-4 is unremarkable. C4-5 demonstrates minimal posterior disc osteophyte complex without canal  stenosis or foraminal narrowing. C5-6 is unremarkable. C6-7 is unremarkable. C7-T1 is unremarkable. Incidental soft tissue imaging is unremarkable. Impression  Continued improvement in abnormal cord signal at the cervical  medullary junction. No evidence of enhancement      IR Results (recent):  No results found for this or any previous visit. VAS/US Results (recent):  No results found for this or any previous visit. Reviewed records in [facility] and media tab today    Lab Review     No visits with results within 3 Month(s) from this visit.    Latest known visit with results is:   Admission on 05/17/2022, Discharged on 05/17/2022   Component Date Value Ref Range Status    WBC 05/17/2022 11.1 (A) 3.6 - 11.0 K/uL Final    RBC 05/17/2022 3.98  3.80 - 5.20 M/uL Final    HGB 05/17/2022 10.7 (A) 11.5 - 16.0 g/dL Final    HCT 05/17/2022 35.7  35.0 - 47.0 % Final    MCV 05/17/2022 89.7  80.0 - 99.0 FL Final MCH 05/17/2022 26.9  26.0 - 34.0 PG Final    MCHC 05/17/2022 30.0  30.0 - 36.5 g/dL Final    RDW 05/17/2022 19.2 (A) 11.5 - 14.5 % Final    PLATELET 01/67/5539 051 (A) 150 - 400 K/uL Final    MPV 05/17/2022 8.7 (A) 8.9 - 12.9 FL Final    NRBC 05/17/2022 0.0  0  WBC Final    ABSOLUTE NRBC 05/17/2022 0.00  0.00 - 0.01 K/uL Final    NEUTROPHILS 05/17/2022 76 (A) 32 - 75 % Final    LYMPHOCYTES 05/17/2022 10 (A) 12 - 49 % Final    MONOCYTES 05/17/2022 6  5 - 13 % Final    EOSINOPHILS 05/17/2022 7  0 - 7 % Final    BASOPHILS 05/17/2022 1  0 - 1 % Final    IMMATURE GRANULOCYTES 05/17/2022 0  0.0 - 0.5 % Final    ABS. NEUTROPHILS 05/17/2022 8.5 (A) 1.8 - 8.0 K/UL Final    ABS. LYMPHOCYTES 05/17/2022 1.1  0.8 - 3.5 K/UL Final    ABS. MONOCYTES 05/17/2022 0.7  0.0 - 1.0 K/UL Final    ABS. EOSINOPHILS 05/17/2022 0.7 (A) 0.0 - 0.4 K/UL Final    ABS. BASOPHILS 05/17/2022 0.1  0.0 - 0.1 K/UL Final    ABS. IMM. GRANS. 05/17/2022 0.0  0.00 - 0.04 K/UL Final    DF 05/17/2022 AUTOMATED    Final    Sodium 05/17/2022 135 (A) 136 - 145 mmol/L Final    Potassium 05/17/2022 4.3  3.5 - 5.1 mmol/L Final    SPECIMEN HEMOLYZED, RESULTS MAY BE AFFECTED    Chloride 05/17/2022 104  97 - 108 mmol/L Final    CO2 05/17/2022 27  21 - 32 mmol/L Final    Anion gap 05/17/2022 4 (A) 5 - 15 mmol/L Final    Glucose 05/17/2022 90  65 - 100 mg/dL Final    BUN 05/17/2022 6  6 - 20 MG/DL Final    Creatinine 05/17/2022 0.61  0.55 - 1.02 MG/DL Final    BUN/Creatinine ratio 05/17/2022 10 (A) 12 - 20   Final    GFR est AA 05/17/2022 >60  >60 ml/min/1.73m2 Final    GFR est non-AA 05/17/2022 >60  >60 ml/min/1.73m2 Final    Estimated GFR is calculated using the IDMS-traceable Modification of Diet in Renal Disease (MDRD) Study equation, reported for both  Americans (GFRAA) and non- Americans (GFRNA), and normalized to 1.73m2 body surface area. The physician must decide which value applies to the patient.     Calcium 05/17/2022 8.5  8.5 - 10.1 MG/DL Final    Bilirubin, total 05/17/2022 0.5  0.2 - 1.0 MG/DL Final    ALT (SGPT) 05/17/2022 172 (A) 12 - 78 U/L Final    AST (SGOT) 05/17/2022 178 (A) 15 - 37 U/L Final    SPECIMEN HEMOLYZED, RESULTS MAY BE AFFECTED    Alk. phosphatase 05/17/2022 47  45 - 117 U/L Final    Protein, total 05/17/2022 7.4  6.4 - 8.2 g/dL Final    Albumin 05/17/2022 2.5 (A) 3.5 - 5.0 g/dL Final    Globulin 05/17/2022 4.9 (A) 2.0 - 4.0 g/dL Final    A-G Ratio 05/17/2022 0.5 (A) 1.1 - 2.2   Final    Sed rate, automated 05/17/2022 65 (A) 0 - 20 mm/hr Final    SAMPLES BEING HELD 05/17/2022 1SST,1RED   Final    COMMENT 05/17/2022 Add-on orders for these samples will be processed based on acceptable specimen integrity and analyte stability, which may vary by analyte. Final         Objective:       Exam:  Visit Vitals  /70   Pulse 95   Ht 5' 3\" (1.6 m)   SpO2 100%   BMI 25.70 kg/m²     Gen: Awake, alert, follows commands  Appropriate appearance, normal speech. Oriented to all spheres. No visual field defect on confrontation exam.  Full eyes movement, with no nystagmus, no diplopia, no ptosis. Normal gag and swallow. All remaining cranial nerves were normal  Motor function: 5/5 in all extremities  (+) tenderness R occipital area  Sensory: intact to LT, PP and JPS  DTRs ++ in all extremities, (-) [place] FTN and HTS   Gait: Normal    Assessment:       ICD-10-CM ICD-9-CM    1. Neck pain  M54.2 723.1 MRI CERV SPINE W WO CONT      2. 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 No evidence of enhancement     R neck and R occipital pain, possible related to rheumatoid arthritis at this point  vs R occipital neuralgia       Plan: Will do R occipital nerve block  2. MRI cervical spien with JONA to look for interval changes of myelitis  3. Continue Tizanidine 4 mg QHS  3. Continue Cymbalta to 60 mg BID  4. Continue Gabapentin 600 mg TID for now  5. Follow up with her rheumatologist regarding steroid treatment and Dionte Crook        Follow-up and Dispositions    Return for occipital nerve block.                Zeinab Shine MD  Diplomate, American Board of Psychiatry and Neurology  Diplomate, Neuromuscular Medicine  Diplomate, American Board of Electrodiagnostic Medicine

## 2022-08-23 ENCOUNTER — OFFICE VISIT (OUTPATIENT)
Dept: NEUROLOGY | Age: 37
End: 2022-08-23
Payer: COMMERCIAL

## 2022-08-23 ENCOUNTER — TELEPHONE (OUTPATIENT)
Dept: NEUROLOGY | Age: 37
End: 2022-08-23

## 2022-08-23 VITALS
OXYGEN SATURATION: 100 % | BODY MASS INDEX: 25.7 KG/M2 | HEIGHT: 63 IN | SYSTOLIC BLOOD PRESSURE: 120 MMHG | HEART RATE: 95 BPM | DIASTOLIC BLOOD PRESSURE: 70 MMHG

## 2022-08-23 DIAGNOSIS — G37.3 ACUTE TRANSVERSE MYELITIS (HCC): ICD-10-CM

## 2022-08-23 DIAGNOSIS — M54.2 NECK PAIN: Primary | ICD-10-CM

## 2022-08-23 PROCEDURE — 99215 OFFICE O/P EST HI 40 MIN: CPT | Performed by: PSYCHIATRY & NEUROLOGY

## 2022-08-23 NOTE — TELEPHONE ENCOUNTER
Called pt. No answer left message on VM to call office. Calling pt to inform her that no PA is needed for the Occipital Nerve Block submitted CPT code on [employer] Provider.

## 2022-08-23 NOTE — LETTER
8/23/2022    Patient: Sanjeev Castellanos   YOB: 1985   Date of Visit: 8/23/2022     Timothy Cruz, [address] [address]  Via Fax: [phone]    Dear Timothy Cruz DO,      Thank you for referring Ms. Sanjeev Castellanos to [facility] for evaluation. My notes for this consultation are attached. If you have questions, please do not hesitate to call me. I look forward to following your patient along with you.       Sincerely,    Prakash Ricci MD New orders have been placed

## 2022-08-25 ENCOUNTER — TELEPHONE (OUTPATIENT)
Dept: NEUROLOGY | Age: 37
End: 2022-08-25

## 2022-08-25 NOTE — TELEPHONE ENCOUNTER
Called pt. Verified. Inform pt that no PA is needed for Rt side Occipital Nerve Block. Pt is scheduled for Thursday 9/1/22 at 2:20pm w/ Dr. Claudette Weldon.

## 2022-09-01 ENCOUNTER — OFFICE VISIT (OUTPATIENT)
Dept: NEUROLOGY | Age: 37
End: 2022-09-01
Payer: COMMERCIAL

## 2022-09-01 DIAGNOSIS — M54.81 OCCIPITAL NEURALGIA OF RIGHT SIDE: Primary | ICD-10-CM

## 2022-09-01 PROCEDURE — 64450 NJX AA&/STRD OTHER PN/BRANCH: CPT | Performed by: PSYCHIATRY & NEUROLOGY

## 2022-09-01 PROCEDURE — 64405 NJX AA&/STRD GR OCPL NRV: CPT | Performed by: PSYCHIATRY & NEUROLOGY

## 2022-09-01 NOTE — PROGRESS NOTES
Occipital Nerve Block    Patient ID:  Santino Bowen  [id number]  72 y.o.  1985    Patient continues to have 9/10 R occipital pain. Occipital nerve blocks:  1 cc Methyl Prednisolone (40 mg) and 2 cc Marcaine 0.5%  injected 1 cc into each right greater and lesser Occipital Nerves and upper neck region (Total of 3 cc given). The patient tolerated the procedure well. [facility] [place]  OFFICE PROCEDURE PROGRESS NOTE        Chart reviewed for the following:   Josi Driver MD, have reviewed the History, Physical and updated the Allergic reactions for [address] [place] [address] performed immediately prior to start of procedure:   Josi Driver MD, have performed the following reviews on Santino Bowen   prior to the start of the procedure:            * Patient was identified by name and date of birth   * Agreement on procedure being performed was verified  * Risks and Benefits explained to the patient  * Procedure site verified and marked as necessary  * Patient was positioned for comfort  * Consent was signed and verified       Date of procedure: 9/1/2022    Procedure performed by:  Nate Waggoner MD    Provider assisted by: Estefania Manuel RN    Patient assisted by:     How tolerated by patient: tolerated the procedure well with no complications    Post Procedural Pain Scale: 9/10    Comments: Follow up in 1 month. Awaiting to get MRI cervical spine .               Nate Waggoner MD

## 2022-09-08 RX ORDER — TIZANIDINE 4 MG/1
4 TABLET ORAL
Qty: 90 TABLET | Refills: 1 | Status: SHIPPED | OUTPATIENT
Start: 2022-09-08

## 2022-09-20 ENCOUNTER — PATIENT MESSAGE (OUTPATIENT)
Dept: NEUROLOGY | Age: 37
End: 2022-09-20

## 2022-09-20 DIAGNOSIS — M54.2 NECK PAIN: ICD-10-CM

## 2022-09-20 DIAGNOSIS — M54.81 OCCIPITAL NEURALGIA OF RIGHT SIDE: Primary | ICD-10-CM

## 2022-09-21 RX ORDER — GABAPENTIN 600 MG/1
600 TABLET ORAL 3 TIMES DAILY
Qty: 90 TABLET | Refills: 3 | Status: SHIPPED | OUTPATIENT
Start: 2022-09-21

## 2022-09-21 NOTE — TELEPHONE ENCOUNTER
----- Message from Simon Toledo sent at 9/21/2022  9:38 AM EDT -----  Regarding: FW: Pain is worse     ----- Message -----  From: Brandyn Reina  Sent: 9/20/2022  11:16 AM EDT  [facility]  Subject: Pain is worse                                    Hi  the injection worked for a week and then stopped pain came back worse I  going to get the mri before seeing u again I just need to find the number I have about 20 graciela left and no more refills can I please get some more refills added i find when I dont take the gabapentin my pain is a 13 out of 10 as a posed to 10 out of 10 with meds

## 2022-09-29 ENCOUNTER — OFFICE VISIT (OUTPATIENT)
Dept: ORTHOPEDIC SURGERY | Age: 37
End: 2022-09-29
Payer: COMMERCIAL

## 2022-09-29 VITALS — HEIGHT: 63 IN | WEIGHT: 145 LBS | BODY MASS INDEX: 25.69 KG/M2

## 2022-09-29 DIAGNOSIS — S99.911A INJURY OF RIGHT ANKLE, INITIAL ENCOUNTER: ICD-10-CM

## 2022-09-29 DIAGNOSIS — M25.571 PAIN AND SWELLING OF RIGHT ANKLE: ICD-10-CM

## 2022-09-29 DIAGNOSIS — S82.64XA CLOSED NONDISPLACED FRACTURE OF LATERAL MALLEOLUS OF RIGHT FIBULA, INITIAL ENCOUNTER: Primary | ICD-10-CM

## 2022-09-29 DIAGNOSIS — M25.471 PAIN AND SWELLING OF RIGHT ANKLE: ICD-10-CM

## 2022-09-29 PROCEDURE — 99213 OFFICE O/P EST LOW 20 MIN: CPT | Performed by: ORTHOPAEDIC SURGERY

## 2022-09-29 NOTE — PROGRESS NOTES
Haylee Herrera (: 1985) is a 40 y.o. female, patient,here for evaluation of the following   Chief Complaint   Patient presents with    Ankle Pain     Right ankle fell down steps last Tuesday         ASSESSMENT/PLAN:  Below is the assessment and plan developed based on review of pertinent history, physical exam, labs, studies, and medications. 1. Closed nondisplaced fracture of lateral malleolus of right fibula, initial encounter  2. Injury of right ankle, initial encounter  -     XR STANDING ANKLE RT MIN 3 V; Future  3. Pain and swelling of right ankle      Patient is informed of findings on exam, based on exam for her fibula is the most tender and less at the ligaments, I am suspecting a nondisplaced lateral malleolus fracture. This could also of course be a moderate to severe sprain that is painful at this point. I did recommend immobilization either in a brace or boot, to help with the pain and swelling. I did recommend she return to see me in approxi-4 to 6 weeks, no x-rays will be obtained right ankle 3 views nonweightbearing. If any problems prior to next follow-up, she should return sooner. Return in about 4 weeks (around 10/27/2022) for repeat xrays. No Known Allergies    Current Outpatient Medications   Medication Sig    gabapentin (NEURONTIN) 600 mg tablet Take 1 Tablet by mouth three (3) times daily. Max Daily Amount: 1,800 mg.    tiZANidine (ZANAFLEX) 4 mg tablet TAKE 1 TABLET BY MOUTH NIGHTLY    predniSONE (DELTASONE) 5 mg tablet     DULoxetine (CYMBALTA) 60 mg capsule Take 1 Capsule by mouth two (2) times a day. No current facility-administered medications for this visit. Past Medical History:   Diagnosis Date    History of vascular access device 2022    [facility] VAT: 4 FR Single lumen PICC Right brachial for LT ABX therapy; Length 35 CM Arm circ 31 CM.  Verified with Max P @ 35 CM    Raynaud's disease     Rheumatoid arthritis ([facility] [address]        Past Surgical History: Procedure Laterality Date    HX GYN      hystorectomy       No family history on file. Social History     Socioeconomic History    Marital status:      Spouse name: Not on file    Number of children: Not on file    Years of education: Not on file    Highest education level: Not on file   Occupational History    Not on file   Tobacco Use    Smoking status: Never    Smokeless tobacco: Never   Substance and Sexual Activity    Alcohol use: Not Currently    Drug use: Not Currently    Sexual activity: Not on file   Other Topics Concern     Service Not Asked    Blood Transfusions Not Asked    Caffeine Concern Not Asked    Occupational Exposure Not Asked    Hobby Hazards Not Asked    Sleep Concern Not Asked    Stress Concern Not Asked    Weight Concern Not Asked    Special Diet Not Asked    Back Care Not Asked    Exercise Not Asked    Bike Helmet Not Asked    Seat Belt Not Asked    Self-Exams Not Asked   Social History Narrative    Not on file     Social Determinants of Health     Financial Resource Strain: Not on file   Food Insecurity: Not on file   Transportation Needs: Not on file   Physical Activity: Not on file   Stress: Not on file   Social Connections: Not on file   Intimate Partner Violence: Not on file   Housing Stability: Not on file           Vitals:  Ht 5' 3\" (1.6 m)   Wt 145 lb (65.8 kg)   BMI 25.69 kg/m²    Body mass index is 25.69 kg/m². SUBJECTIVE:  Za Cosme (: 1985)   New patient presents today with complaint of right ankle pain and swelling related to an injury when she fell down the steps and twisted her ankle and this happened about a week ago. She is still having some trouble walking and the pain is focused to the lateral side of the ankle. The pain is moderate sharp throbbing pain that comes and goes. She is noticing the swelling and weakness present. Twisting, stairs/steps, attempting to run make it worse. She has tried ice and elevation to help. She is already taking pain medications for other conditions which she feels is also helping some of the symptoms. She has not seen other physicians or providers. She is not diabetic, she does have other significant medical conditions for which she is taking medications. OBJECTIVE EXAM:     Visit Vitals  Ht 5' 3\" (1.6 m)   Wt 145 lb (65.8 kg)   BMI 25.69 kg/m²       Appearance: Alert, well appearing and pleasant patient who is in no distress, oriented to person, place/time, and who follows commands. This patient is accompanied in the       office by her  self. Psychiatric: Affect and mood are appropriate. No dementia noted on examination  Musculoskeletal:  LOCATION: Diffuse tenderness and swelling ankle - right      Integumentary: No rashes, skin patches, wounds, or abrasions to the right or left legs       Warm and normal color. No regions of expressible drainage. Gait: Normal      Tenderness: No tenderness        Motor/Strength/Tone Exam: Normal       Sensory Exam:   Intact Normal Sensation to ankle/foot      Stability Testing: No anterolateral or varus instability of the Ankle or Subtalar Joints               No peroneal tendon instability noted      ROM: Normal ROM noted to ankle/foot      Contractures: No Achilles or Gastrocnemius Contractures      Calf tenderness: Absent for calf or gastrocnemius muscle regions       Soft, supple, non tender, non taut lower extremity compartment  Alignment:      NEUTRAL Hindfoot,         none Metatarsus Adductus Metatarsus   Wounds/Abrasions:    None present  Extremities:   No embolic phenomena to the toes          No significant edema to the foot and or toes.         Lower extremities are warm and appear well perfused    DVT: No evidence of DVT seen on examination at this time     No calf swelling, no tenderness to calf muscles  Lymphatic:  No Evidence of Lymphedema  Vascular: Medial Border of Tibia Region: Edema is not present         Pulses: Dorsalis Pedis & Posterior Tibial Pulses : Palpable yes        Varicosities Lower Limbs :  None  noted  Neuro: Negative bilateral Straight leg raise (seated position)    See Musculoskeletal section for pertinent individual extremity examination    No abnormal hand/wrist, foot/ankle, or facial/neck tremors. Lower Extremity/Ankle/Foot:  Antalgic gait, satisfactory weightbearing stance. Right lower extremity/ankle: There is some swelling over the lateral malleolus which is very tender on exam, there ATFL is also mildly tender but the fibula is the most tender, CFL is mostly nontender. The deltoid ligament is mildly tender, medial malleolus nontender. Achilles tendon nontender and intact with negative Gonzalez test, negative ankle squeeze test.    Right foot: Nontender, no swelling, ligament stable, normal exam.    Contralateral lower extremity/ankle /foot exam:  Nontender, no swelling ligaments grossly stable. Normal weightbearing stance. Neurovascular exam is grossly intact light touch sensation, toes are warm, dorsalis pedis pulse palpable, EHL/FHL 5/5. Imaging:    XR Results (most recent):  Results from Appointment encounter on 09/29/22    XR STANDING ANKLE RT MIN 3 V    Narrative  Right Ankle AP, lateral and oblique standing x-rays show no acute fracture or dislocation, there is soft tissue swelling indicating sprain at the anterior lateral ankle, normal ankle mortise and joint. Satisfactory bone density. Examination is most tender at the fibula, I do not see an obvious fracture at the fibula on these views however could be a hairline fracture that is not visible at this time. An electronic signature was used to authenticate this note.   -- Jesus Parker MD

## 2022-09-29 NOTE — LETTER
10/3/2022    Patient: Amanda Colon   YOB: 1985   Date of Visit: 9/29/2022     Tenisha Tello, [address] [address]  Via Fax: [phone]    Dear Tenisha Tello DO,      Thank you for referring Ms. Amanda Colon to [place] for evaluation. My notes for this consultation are attached. If you have questions, please do not hesitate to call me. I look forward to following your patient along with you.       Sincerely,    Heriberto Menard MD

## 2022-09-30 ENCOUNTER — HOSPITAL ENCOUNTER (OUTPATIENT)
Dept: MRI IMAGING | Age: 37
Discharge: HOME | End: 2022-09-30
Attending: PSYCHIATRY & NEUROLOGY
Payer: COMMERCIAL

## 2022-09-30 DIAGNOSIS — M54.2 NECK PAIN: ICD-10-CM

## 2022-09-30 PROCEDURE — 74011250636 HC RX REV CODE- 250/636: Performed by: PSYCHIATRY & NEUROLOGY

## 2022-09-30 PROCEDURE — A9576 INJ PROHANCE MULTIPACK: HCPCS | Performed by: PSYCHIATRY & NEUROLOGY

## 2022-09-30 PROCEDURE — 72156 MRI NECK SPINE W/O & W/DYE: CPT

## 2022-09-30 RX ADMIN — GADOTERIDOL 14 ML: 279.3 INJECTION, SOLUTION INTRAVENOUS at 19:52

## 2022-10-27 ENCOUNTER — OFFICE VISIT (OUTPATIENT)
Dept: ORTHOPEDIC SURGERY | Age: 37
End: 2022-10-27
Payer: COMMERCIAL

## 2022-10-27 VITALS — WEIGHT: 145 LBS | HEIGHT: 63 IN | BODY MASS INDEX: 25.69 KG/M2

## 2022-10-27 DIAGNOSIS — S82.64XD CLOSED NONDISPLACED FRACTURE OF LATERAL MALLEOLUS OF RIGHT FIBULA WITH ROUTINE HEALING, SUBSEQUENT ENCOUNTER: Primary | ICD-10-CM

## 2022-10-27 PROCEDURE — 99212 OFFICE O/P EST SF 10 MIN: CPT | Performed by: ORTHOPAEDIC SURGERY

## 2022-10-27 NOTE — LETTER
10/31/2022    Patient: Mateo Riojas   YOB: 1985   Date of Visit: 10/27/2022     Swathi Estrada, [address] [address]  Via Fax: [phone]    Dear Swathi Estrada DO,      Thank you for referring Ms. Mateo Riojas to [facility] for evaluation. My notes for this consultation are attached. If you have questions, please do not hesitate to call me. I look forward to following your patient along with you.       Sincerely,    Graciela Xiong MD

## 2022-10-27 NOTE — PROGRESS NOTES
Severiano Spence (: 1985) is a 40 y.o. female, patient,here for evaluation of the following   Chief Complaint   Patient presents with    Follow-up    Ankle Pain        ASSESSMENT/PLAN:  Below is the assessment and plan developed based on review of pertinent history, physical exam, labs, studies, and medications. 1. Closed nondisplaced fracture of lateral malleolus of right fibula with routine healing, subsequent encounter  -     XR ANKLE RT MIN 3 V; Future      Dexter Summers is doing much better today, I suspected a nondisplaced lateral malleolus fracture and by the repeat x-rays, I feel there is some healing present and noted different from the original x-rays. She is slow to heal however due to underlying use of steroid for other medical conditions but I anticipate full healing. Overall alignment ankle is good. She will continue with soft brace as needed, activities as tolerated. No surgical indications. Return if symptoms worsen or fail to improve. No Known Allergies    Current Outpatient Medications   Medication Sig    gabapentin (NEURONTIN) 600 mg tablet Take 1 Tablet by mouth three (3) times daily. Max Daily Amount: 1,800 mg.    tiZANidine (ZANAFLEX) 4 mg tablet TAKE 1 TABLET BY MOUTH NIGHTLY    predniSONE (DELTASONE) 5 mg tablet     DULoxetine (CYMBALTA) 60 mg capsule Take 1 Capsule by mouth two (2) times a day. No current facility-administered medications for this visit. Past Medical History:   Diagnosis Date    History of vascular access device 2022    [facility] VAT: 4 FR Single lumen PICC Right brachial for LT ABX therapy; Length 35 CM Arm circ 31 CM. Verified with Max P @ 35 CM    Raynaud's disease     Rheumatoid arthritis ([facility] [address]        Past Surgical History:   Procedure Laterality Date    HX GYN      hystorectomy       No family history on file.     Social History     Socioeconomic History    Marital status:      Spouse name: Not on file    Number of children: Not on file Years of education: Not on file    Highest education level: Not on file   Occupational History    Not on file   Tobacco Use    Smoking status: Never    Smokeless tobacco: Never   Substance and Sexual Activity    Alcohol use: Not Currently    Drug use: Not Currently    Sexual activity: Not on file   Other Topics Concern     Service Not Asked    Blood Transfusions Not Asked    Caffeine Concern Not Asked    Occupational Exposure Not Asked    Hobby Hazards Not Asked    Sleep Concern Not Asked    Stress Concern Not Asked    Weight Concern Not Asked    Special Diet Not Asked    Back Care Not Asked    Exercise Not Asked    Bike Helmet Not Asked    Seat Belt Not Asked    Self-Exams Not Asked   Social History Narrative    Not on file     Social Determinants of Health     Financial Resource Strain: Not on file   Food Insecurity: Not on file   Transportation Needs: Not on file   Physical Activity: Not on file   Stress: Not on file   Social Connections: Not on file   Intimate Partner Violence: Not on file   Housing Stability: Not on file           Vitals:  Ht 5' 3\" (1.6 m)   Wt 145 lb (65.8 kg)   BMI 25.69 kg/m²    Body mass index is 25.69 kg/m². SUBJECTIVE:  Major Bucker (: 1985)   Dima Hedrick is back today, she is doing much better in terms of the pain and swelling at the right ankle lateral malleolus, she was diagnosed with probable nondisplaced lateral malleolus fracture due to the amount of pain and swelling she had at the lateral malleolus. She has been immobilized in with a brace and has improved. She has no other complaints today. She is taking steroid treatment for other conditions which can delay healing but overall she seems to be doing much better. OBJECTIVE EXAM:     Visit Vitals  Ht 5' 3\" (1.6 m)   Wt 145 lb (65.8 kg)   BMI 25.69 kg/m²       Appearance: Alert, well appearing and pleasant patient who is in no distress, oriented to person, place/time, and who follows commands. This patient is accompanied in the       office by her  self. Psychiatric: Affect and mood are appropriate. No dementia noted on examination  Musculoskeletal:  LOCATION: Mild tenderness and swelling ankle - right      Integumentary: No rashes, skin patches, wounds, or abrasions to the right or left legs       Warm and normal color. No regions of expressible drainage. Gait: Normal      Tenderness: No tenderness        Motor/Strength/Tone Exam: Normal       Sensory Exam:   Intact Normal Sensation to ankle/foot      Stability Testing: No anterolateral or varus instability of the Ankle or Subtalar Joints               No peroneal tendon instability noted      ROM: Normal ROM noted to ankle/foot      Contractures: No Achilles or Gastrocnemius Contractures      Calf tenderness: Absent for calf or gastrocnemius muscle regions       Soft, supple, non tender, non taut lower extremity compartment  Alignment:      NEUTRAL Hindfoot,         none Metatarsus Adductus Metatarsus   Wounds/Abrasions:    None present  Extremities:   No embolic phenomena to the toes          No significant edema to the foot and or toes. Lower extremities are warm and appear well perfused    DVT: No evidence of DVT seen on examination at this time     No calf swelling, no tenderness to calf muscles  Lymphatic:  No Evidence of Lymphedema  Vascular: Medial Border of Tibia Region: Edema is not present         Pulses: Dorsalis Pedis &  Posterior Tibial Pulses : Palpable yes        Varicosities Lower Limbs :  None  noted  Neuro: Negative bilateral Straight leg raise (seated position)    See Musculoskeletal section for pertinent individual extremity examination    No abnormal hand/wrist, foot/ankle, or facial/neck tremors. Lower Extremity/Ankle/Foot:  Mostly normal gait, satisfactory weightbearing stance. Right lower extremity/ankle:  There is less tenderness and swelling to the lateral malleolus, with deep palpation there is still little bit of soreness but much improved, the rest of ankle is intact nontender, negative Gonzalez test, negative ankle squeeze test.    Right foot: There is no tenderness to palpation, nontender, no swelling. Contralateral lower extremity/ankle /foot exam:  Nontender, no swelling ligaments grossly stable. Normal weightbearing stance. Neurovascular exam grossly intact. Imaging:    XR Results (most recent):  Results from Appointment encounter on 10/27/22    XR ANKLE RT MIN 3 V    Narrative  Right ankle AP, lateral and oblique x-ray showed less swelling to the ankle, there is satisfactory bone density, some signs of healing at lateral malleolus. An electronic signature was used to authenticate this note.   -- Paticia Skiff, MD

## 2022-11-18 ENCOUNTER — OFFICE VISIT (OUTPATIENT)
Dept: NEUROLOGY | Age: 37
End: 2022-11-18
Payer: COMMERCIAL

## 2022-11-18 VITALS
HEIGHT: 63 IN | HEART RATE: 117 BPM | OXYGEN SATURATION: 99 % | WEIGHT: 158 LBS | SYSTOLIC BLOOD PRESSURE: 110 MMHG | DIASTOLIC BLOOD PRESSURE: 60 MMHG | BODY MASS INDEX: 28 KG/M2

## 2022-11-18 DIAGNOSIS — G37.3 ACUTE TRANSVERSE MYELITIS (HCC): ICD-10-CM

## 2022-11-18 DIAGNOSIS — M54.81 OCCIPITAL NEURALGIA OF RIGHT SIDE: Primary | ICD-10-CM

## 2022-11-18 PROCEDURE — 99215 OFFICE O/P EST HI 40 MIN: CPT | Performed by: PSYCHIATRY & NEUROLOGY

## 2022-11-18 RX ORDER — OXYCODONE HYDROCHLORIDE 5 MG/1
1 TABLET ORAL 3 TIMES DAILY
COMMUNITY

## 2022-11-18 RX ORDER — CARBAMAZEPINE 100 MG/1
100 TABLET, EXTENDED RELEASE ORAL 2 TIMES DAILY
Qty: 60 TABLET | Refills: 3 | Status: SHIPPED | OUTPATIENT
Start: 2022-11-18

## 2022-11-18 RX ORDER — TIZANIDINE HYDROCHLORIDE 6 MG/1
6 CAPSULE, GELATIN COATED ORAL
Qty: 30 CAPSULE | Refills: 3 | Status: SHIPPED | OUTPATIENT
Start: 2022-11-18

## 2022-11-18 NOTE — PROGRESS NOTES
Neurology Progress Note    Patient ID:  Kenia Victoria  [id number]  89 y.o.  1985      Subjective:   History:  Kenia Victoria is a female who  has a past medical history of Raynaud's disease and Rheumatoid arthritis ([address] who was admitted 3/4/22 at [facility] for 2 weeks R neck pain. Eventually, patient developed numbness of the R side of face and R neck. (+) R UE weakness. MRI brain unremarkable. 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 Patient given SoluMedrol and ID Dr Oscar Gardner consulted for (+) VZV. Patient saw rheumatologist Dr Maria Neri and was placed on steroid with decreased neck pain. Robaxin did not work. On 9/1/22, patient had R occipital nerve block whic helped but only lasted for 1 week. Saw a neurologist at [facility] who specializes in inflammation who thought R occipital pain is due to Varicella Zoster virus. Currently has severe pain on the R occipital area, 10/10, burning throbbing and burning since then. Still on Gabapentin 600 mg TID and Cymbalta 60 mg BID. Still on  TIzanidine 4 mg QHS with benefit. Patient on Lambert Chyle and steroid  c/o rheumatologist.     ROS:  Per HPI-  Otherwise the remainder of ROS was negative    Social Hx  Social History     Socioeconomic History    Marital status:    Tobacco Use    Smoking status: Never    Smokeless tobacco: Never   Substance and Sexual Activity    Alcohol use: Not Currently    Drug use: Not Currently       Meds:  Current Outpatient Medications on File Prior to Visit   Medication Sig Dispense Refill    oxyCODONE IR (ROXICODONE) 5 mg immediate release tablet Take 1 Tablet by mouth three (3) times daily. gabapentin (NEURONTIN) 600 mg tablet Take 1 Tablet by mouth three (3) times daily. Max Daily Amount: 1,800 mg. 90 Tablet 3    predniSONE (DELTASONE) 5 mg tablet       DULoxetine (CYMBALTA) 60 mg capsule Take 1 Capsule by mouth two (2) times a day.  180 Capsule 3     No current facility-administered medications on file prior to visit. Imaging:    CT Results (recent):  Results from Hospital Encounter encounter on 03/18/22    CTA CHEST W OR W WO CONT    Narrative  EXAM: CTA CHEST W OR W WO CONT    INDICATION: SOB    COMPARISON: None. CONTRAST: 80 mL of Isovue-370. TECHNIQUE:  Precontrast  images were obtained to localize the volume for acquisition. Multislice helical CT arteriography was performed from the diaphragm to the  thoracic inlet during uneventful rapid bolus intravenous contrast  administration. Lung and soft tissue windows were generated. Coronal and  sagittal images were generated and 3D post processing consisting of coronal  maximum intensity images was performed. CT dose reduction was achieved through  use of a standardized protocol tailored for this examination and automatic  exposure control for dose modulation. FINDINGS:  THYROID: No nodule. MEDIASTINUM: No mass or lymphadenopathy. Residual thymic tissue is seen in the  anterior mediastinum. RIA: Subcentimeter lymph nodes in both ria are likely reactive. THORACIC AORTA: No dissection or aneurysm. PULMONARY ARTERIES: Normal in caliber. The pulmonary arteries are well enhanced. No evidence of pulmonary embolus. TRACHEA/BRONCHI: Patent. ESOPHAGUS: No wall thickening or dilatation. HEART: Normal in size. PLEURA: No effusion or pneumothorax. LUNGS: No nodule, mass, or airspace disease. Mild atelectasis is seen in the  lung bases. INCIDENTALLY IMAGED UPPER ABDOMEN: No focal abnormality. BONES: No destructive bone lesion. Impression  1. Negative for pulmonary embolus. 2. Mild atelectasis in the lung bases. [place]       MRI Results (recent):  Results from [place] encounter on 09/30/22    MRI CERV SPINE W WO CONT    Narrative  EXAM: MRI CERV SPINE W WO CONT  Clinical history: right-sided neck pain  INDICATION: R neck pain; history of Cervical myelitis; evaluate for interval  worsening. Cervicalgia    COMPARISON: 5/9/2022    TECHNIQUE: MR imaging of the cervical spine was performed using the following  sequences: sagittal T1, T2, STIR;  axial T2, T1 prior to and following contrast  administration. CONTRAST: ProHance        FINDINGS:    No Chiari or syrinx. Mild reversal of cervical lordosis. .  There are no  suspicious marrow lesions or evidence of acute bony trauma. Increased signal  within the cord at the cervical medullary junction has not significantly changed  in size. No enhancement and no new cord lesions are demonstrated. Paraspinous  soft tissues are within normal limits. Craniocervical junction: Intact. Without stenosis    C2-C3: No stenosis    C3-C4: No stenosis    C4-C5: Small disc osteophytic bulge and uncovertebral degenerative change. The  canal is patent. Minimal right foraminal stenosis. No significant change. C5-C6: Minimal left uncovertebral degenerative change without stenosis. Loss of  disc height. Disc bulge. No significant change. C6-C7: Minimal disc osteophytic bulge asymmetric to the left, slightly narrowing  the proximal left foramen. Disc desiccation. No significant change. Examination of the upper thoracic spine demonstrates no significant stenosis. Impression  Intramedullary increased T2 lesion cervical medullary junction is not  significantly changed. No new lesions or areas of abnormal enhancement. No cord expansion      IR Results (recent):  No results found for this or any previous visit. VAS/US Results (recent):  No results found for this or any previous visit. Reviewed records in [facility] tab today    Lab Review     No visits with results within 3 Month(s) from this visit.    Latest known visit with results is:   Admission on 05/17/2022, Discharged on 05/17/2022   Component Date Value Ref Range Status    WBC 05/17/2022 11.1 (A)  3.6 - 11.0 K/uL Final    RBC 05/17/2022 3.98  3.80 - 5.20 M/uL Final HGB 05/17/2022 10.7 (A)  11.5 - 16.0 g/dL Final    HCT 05/17/2022 35.7  35.0 - 47.0 % Final    MCV 05/17/2022 89.7  80.0 - 99.0 FL Final    MCH 05/17/2022 26.9  26.0 - 34.0 PG Final    MCHC 05/17/2022 30.0  30.0 - 36.5 g/dL Final    RDW 05/17/2022 19.2 (A)  11.5 - 14.5 % Final    PLATELET 31/11/3131 649 (A)  150 - 400 K/uL Final    MPV 05/17/2022 8.7 (A)  8.9 - 12.9 FL Final    NRBC 05/17/2022 0.0  0  WBC Final    ABSOLUTE NRBC 05/17/2022 0.00  0.00 - 0.01 K/uL Final    NEUTROPHILS 05/17/2022 76 (A)  32 - 75 % Final    LYMPHOCYTES 05/17/2022 10 (A)  12 - 49 % Final    MONOCYTES 05/17/2022 6  5 - 13 % Final    EOSINOPHILS 05/17/2022 7  0 - 7 % Final    BASOPHILS 05/17/2022 1  0 - 1 % Final    IMMATURE GRANULOCYTES 05/17/2022 0  0.0 - 0.5 % Final    ABS. NEUTROPHILS 05/17/2022 8.5 (A)  1.8 - 8.0 K/UL Final    ABS. LYMPHOCYTES 05/17/2022 1.1  0.8 - 3.5 K/UL Final    ABS. MONOCYTES 05/17/2022 0.7  0.0 - 1.0 K/UL Final    ABS. EOSINOPHILS 05/17/2022 0.7 (A)  0.0 - 0.4 K/UL Final    ABS. BASOPHILS 05/17/2022 0.1  0.0 - 0.1 K/UL Final    ABS. IMM.  GRANS. 05/17/2022 0.0  0.00 - 0.04 K/UL Final    DF 05/17/2022 AUTOMATED    Final    Sodium 05/17/2022 135 (A)  136 - 145 mmol/L Final    Potassium 05/17/2022 4.3  3.5 - 5.1 mmol/L Final    SPECIMEN HEMOLYZED, RESULTS MAY BE AFFECTED    Chloride 05/17/2022 104  97 - 108 mmol/L Final    CO2 05/17/2022 27  21 - 32 mmol/L Final    Anion gap 05/17/2022 4 (A)  5 - 15 mmol/L Final    Glucose 05/17/2022 90  65 - 100 mg/dL Final    BUN 05/17/2022 6  6 - 20 MG/DL Final    Creatinine 05/17/2022 0.61  0.55 - 1.02 MG/DL Final    BUN/Creatinine ratio 05/17/2022 10 (A)  12 - 20   Final    GFR est AA 05/17/2022 >60  >60 ml/min/1.73m2 Final    GFR est non-AA 05/17/2022 >60  >60 ml/min/1.73m2 Final    Estimated GFR is calculated using the IDMS-traceable Modification of Diet in Renal Disease (MDRD) Study equation, reported for both  Americans (GFRAA) and non- Americans [facility]), and normalized to 1.73m2 body surface area. The physician must decide which value applies to the patient. Calcium 05/17/2022 8.5  8.5 - 10.1 MG/DL Final    Bilirubin, total 05/17/2022 0.5  0.2 - 1.0 MG/DL Final    ALT (SGPT) 05/17/2022 172 (A)  12 - 78 U/L Final    AST (SGOT) 05/17/2022 178 (A)  15 - 37 U/L Final    SPECIMEN HEMOLYZED, RESULTS MAY BE AFFECTED    Alk. phosphatase 05/17/2022 47  45 - 117 U/L Final    Protein, total 05/17/2022 7.4  6.4 - 8.2 g/dL Final    Albumin 05/17/2022 2.5 (A)  3.5 - 5.0 g/dL Final    Globulin 05/17/2022 4.9 (A)  2.0 - 4.0 g/dL Final    A-G Ratio 05/17/2022 0.5 (A)  1.1 - 2.2   Final    Sed rate, automated 05/17/2022 65 (A)  0 - 20 mm/hr Final    SAMPLES BEING HELD 05/17/2022 1SST,1RED   Final    COMMENT 05/17/2022 Add-on orders for these samples will be processed based on acceptable specimen integrity and analyte stability, which may vary by analyte. Final         Objective:       Exam:  Visit Vitals  /60   Pulse (!) 117   Ht 5' 3\" (1.6 m)   Wt 71.7 kg (158 lb)   SpO2 99%   BMI 27.99 kg/m²     Gen: Awake, alert, follows commands  Appropriate appearance, normal speech. Oriented to all spheres. No visual field defect on confrontation exam.  Full eyes movement, with no nystagmus, no diplopia, no ptosis. Normal gag and swallow. All remaining cranial nerves were normal  Motor function: 5/5 in all extremities  Sensory: intact to LT, PP and JPS  Good FTN and HTS   Gait: Normal    Assessment:       ICD-10-CM ICD-9-CM    1. Occipital neuralgia of right side  M54.81 723.8       2. 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 No new lesions or areas of abnormal enhancement.  No cord expansion     R neck and R occipital pain, possible related to rheumatoid arthritis at this point  vs R occipital neuralgia from VZV       Plan:   Trial of Tegretol  mg BID  2. Increase Tizanidine to 6 mg QHS  3. Trial of Compounded pain cream (Amantadine, Baclofen, Gabapentin, AMitriptyline, Bupivacaine, Clonidine)  4. Discussed MRI cervical spine looked stable  5. Continue Cymbalta to 60 mg BID for now. WIll taper off depending on above  6. Continue Gabapentin 600 mg TID for now  7. Follow up with her rheumatologist regarding steroid treatment and Kevzara           Follow-up and Dispositions    Return in about 6 weeks (around 12/30/2022).                Rebecca Fields MD  Diplomate, American Board of Psychiatry and Neurology  Diplomate, Neuromuscular Medicine  Diplomate, American Board of Electrodiagnostic Medicine

## 2022-11-18 NOTE — LETTER
11/18/2022    Patient: Harley Tapia   YOB: 1985   Date of Visit: 11/18/2022     Solitario Aguilar, [address] [address]  Via Fax: [phone]    Dear Solitario Aguilar DO,      Thank you for referring Ms. Harley Tapia to [facility] for evaluation. My notes for this consultation are attached. If you have questions, please do not hesitate to call me. I look forward to following your patient along with you.       Sincerely,    Devante Grover MD

## 2022-12-23 ENCOUNTER — OFFICE VISIT (OUTPATIENT)
Dept: ORTHOPEDIC SURGERY | Age: 37
End: 2022-12-23
Payer: COMMERCIAL

## 2022-12-23 VITALS — WEIGHT: 158 LBS | BODY MASS INDEX: 28 KG/M2 | HEIGHT: 63 IN

## 2022-12-23 DIAGNOSIS — M79.672 HEEL PAIN, BILATERAL: ICD-10-CM

## 2022-12-23 DIAGNOSIS — M79.671 HEEL PAIN, BILATERAL: ICD-10-CM

## 2022-12-23 DIAGNOSIS — S82.64XG CLOSED NONDISPLACED FRACTURE OF LATERAL MALLEOLUS OF RIGHT FIBULA WITH DELAYED HEALING, SUBSEQUENT ENCOUNTER: ICD-10-CM

## 2022-12-23 DIAGNOSIS — M72.2 PLANTAR FASCIITIS, BILATERAL: Primary | ICD-10-CM

## 2022-12-23 PROCEDURE — 99212 OFFICE O/P EST SF 10 MIN: CPT | Performed by: ORTHOPAEDIC SURGERY

## 2022-12-23 NOTE — LETTER
12/27/2022    Patient: Teri Reina   YOB: 1985   Date of Visit: 12/23/2022     Gurjit Muniz, [address] [address]  Via Fax: [phone]    Dear Gurjit Muniz DO,      Thank you for referring Ms. Teri Reina to [facility] for evaluation. My notes for this consultation are attached. If you have questions, please do not hesitate to call me. I look forward to following your patient along with you.       Sincerely,    Cordell Buchanan MD

## 2022-12-23 NOTE — PROGRESS NOTES
Cm Chen (: 1985) is a 40 y.o. female, patient,here for evaluation of the following   Chief Complaint   Patient presents with    Ankle Pain        ASSESSMENT/PLAN:  Below is the assessment and plan developed based on review of pertinent history, physical exam, labs, studies, and medications. 1. Plantar fasciitis, bilateral  -     REFERRAL TO PHYSICAL THERAPY  2. Heel pain, bilateral  -     REFERRAL TO PHYSICAL THERAPY  3. Closed nondisplaced fracture of lateral malleolus of right fibula with delayed healing, subsequent encounter  -     XR ANKLE RT MIN 3 V; Future  -     REFERRAL TO PHYSICAL THERAPY      Patient is informed of findings on exam and x-rays today. In terms of the right ankle where she had suspected nondisplaced lateral malleolus fracture, it is doing better but she still has some soreness with increased activities. She likely could have delays in healing and she is taking prednisone which can delay healing and therefore still having symptoms. If her symptoms persist for another few months, I may go ahead and get an MRI without contrast to further evaluate. She is informed to call us to let us know if it there is still persistent pain we will proceed with obtaining an MRI without contrast focusing on the right ankle lateral malleolus. In terms of the new problem at the bilateral heel, she is experiencing Planter fasciitis, some of this could also be related to underlying rheumatological condition that can result in heel pain. I did recommend stretching program as demonstrated today in clinic, appropriate shoe wear and insoles and where to purchase. I also referred to physical therapist today. Return if symptoms worsen or fail to improve. Allergies   Allergen Reactions    Doxycycline Nausea and Vomiting       Current Outpatient Medications   Medication Sig    oxyCODONE IR (ROXICODONE) 5 mg immediate release tablet Take 1 Tablet by mouth three (3) times daily.     carBAMazepine XR (TEGretol XR) 100 mg SR tablet Take 1 Tablet by mouth two (2) times a day. tiZANidine (ZANAFLEX) 6 mg capsule Take 1 Capsule by mouth nightly.    gabapentin (NEURONTIN) 600 mg tablet Take 1 Tablet by mouth three (3) times daily. Max Daily Amount: 1,800 mg.    predniSONE (DELTASONE) 5 mg tablet     DULoxetine (CYMBALTA) 60 mg capsule Take 1 Capsule by mouth two (2) times a day. No current facility-administered medications for this visit. Past Medical History:   Diagnosis Date    History of vascular access device 03/08/2022    [facility] VAT: 4 FR Single lumen PICC Right brachial for LT ABX therapy; Length 35 CM Arm circ 31 CM. Verified with Max P @ 35 CM    Raynaud's disease     Rheumatoid arthritis ([facility] [address]        Past Surgical History:   Procedure Laterality Date    HX GYN      hystorectomy       No family history on file.     Social History     Socioeconomic History    Marital status:      Spouse name: Not on file    Number of children: Not on file    Years of education: Not on file    Highest education level: Not on file   Occupational History    Not on file   Tobacco Use    Smoking status: Never    Smokeless tobacco: Never   Substance and Sexual Activity    Alcohol use: Not Currently    Drug use: Not Currently    Sexual activity: Not on file   Other Topics Concern     Service Not Asked    Blood Transfusions Not Asked    Caffeine Concern Not Asked    Occupational Exposure Not Asked    Hobby Hazards Not Asked    Sleep Concern Not Asked    Stress Concern Not Asked    Weight Concern Not Asked    Special Diet Not Asked    Back Care Not Asked    Exercise Not Asked    Bike Helmet Not Asked    Seat Belt Not Asked    Self-Exams Not Asked   Social History Narrative    Not on file     Social Determinants of Health     Financial Resource Strain: Not on file   Food Insecurity: Not on file   Transportation Needs: Not on file   Physical Activity: Not on file   Stress: Not on file   Social Connections: Not on file   Intimate Partner Violence: Not on file   Housing Stability: Not on file           Vitals:  Ht 5' 3\" (1.6 m)   Wt 158 lb (71.7 kg)   BMI 27.99 kg/m²    Body mass index is 27.99 kg/m². SUBJECTIVE:  Eli Porter (: 1985)   Zechariah Pretty returns today for reevaluation, she had a suspected nondisplaced lateral malleolus fracture from an injury sustained while walking down stairs and that is getting better although she still has occasional twinges of pain. More recently she started having more pain into the plantar heel both the right and left lower extremities are causing her some discomfort. She has not had any other injuries. The new symptoms started for the past 3 months now not getting better. She does have other underlying medical conditions that require certain medications that do delay healing. OBJECTIVE EXAM:     Visit Vitals  Ht 5' 3\" (1.6 m)   Wt 158 lb (71.7 kg)   BMI 27.99 kg/m²       Appearance: Alert, well appearing and pleasant patient who is in no distress, oriented to person, place/time, and who follows commands. This patient is accompanied in the       office by her  self. Psychiatric: Affect and mood are appropriate. No dementia noted on examination  Musculoskeletal:  LOCATION: Diffuse tenderness heel-bilateral foot, minimal tenderness lateral ankle right. Integumentary: No rashes, skin patches, wounds, or abrasions to the right or left legs       Warm and normal color. No regions of expressible drainage.       Gait: Normal      Tenderness: No tenderness        Motor/Strength/Tone Exam: Normal       Sensory Exam:   Intact Normal Sensation to ankle/foot      Stability Testing: No anterolateral or varus instability of the Ankle or Subtalar Joints               No peroneal tendon instability noted      ROM: Normal ROM noted to ankle/foot      Contractures: No Achilles or Gastrocnemius Contractures      Calf tenderness: Absent for calf or gastrocnemius muscle regions       Soft, supple, non tender, non taut lower extremity compartment  Alignment:      NEUTRAL Hindfoot,         none Metatarsus Adductus Metatarsus   Wounds/Abrasions:    None present  Extremities:   No embolic phenomena to the toes          No significant edema to the foot and or toes. Lower extremities are warm and appear well perfused    DVT: No evidence of DVT seen on examination at this time     No calf swelling, no tenderness to calf muscles  Lymphatic:  No Evidence of Lymphedema  Vascular: Medial Border of Tibia Region: Edema is not present         Pulses: Dorsalis Pedis &  Posterior Tibial Pulses : Palpable yes        Varicosities Lower Limbs :  None  noted  Neuro: Negative bilateral Straight leg raise (seated position)    See Musculoskeletal section for pertinent individual extremity examination    No abnormal hand/wrist, foot/ankle, or facial/neck tremors. Lower Extremity/Ankle/Foot:  Mild antalgic gait, satisfactory weightbearing stance. Bilateral lower extremity/ankle: There is still  swelling and tenderness along the right lateral malleolus but otherwise improved since last seen, no other areas of tenderness to palpation, there is satisfactory motion for dorsiflexion plantarflexion, ligaments are grossly stable. Tenderness at the left ankle. Bilateral Lower Extremity: FROM actively of toes, foot, ankle, knee and hip. No instability of foot or ankle with drawer and stress. 55 strength to TAEHLGSCPeronealsPTib. No skin lesions. TTP at the medial calcaneal insertion of the plantar fascia. This spot is very painful. Silverskoid exam is Positive for gastric tightness. Neurovascular exam grossly intact. Imaging:    XR Results (most recent):  Results from Appointment encounter on 12/23/22    XR ANKLE RT MIN 3 V    Narrative  Right ankle nonweightbearing 3 views AP, lateral and oblique x-rays show normal ankle mortise and joint space. No soft tissue swelling.   Lateral malleolus suspected fracture appears healed. No other fractures or dislocations. An electronic signature was used to authenticate this note.   -- Mikey Lowery MD

## 2023-01-11 NOTE — PROGRESS NOTES
Neurology Progress Note    Patient ID:  Preston Ramirez  [id number]  64 y.o.  1985      Subjective:   History:  Prseton Ramirez is a female who  has a past medical history of Raynaud's disease and Rheumatoid arthritis ([facility] [address] who was admitted 3/4/22 at [facility] for 2 weeks R neck pain. Eventually, patient developed numbness of the R side of face and R neck. (+) R UE weakness. MRI brain unremarkable. 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 Patient given SoluMedrol and ID Dr Laila Welsh consulted for (+) VZV. Patient saw rheumatologist Dr Meenakshi Garcia and was placed on steroid with decreased neck pain. Robaxin did not work. On 9/1/22, patient had R occipital nerve block whic helped but only lasted for 1 week. Saw a neurologist at [facility] who specializes in inflammation who thought R occipital pain is due to Varicella Zoster virus. Patient was tried on Tegretol 100 mg BID but caused upset stomach so was stopped and no benefit noted. Pain cream helps temporarily. Currently has severe pain on the R occipital area, 8/10, burning throbbing and burning since then. Still on Tizanidine 6 mg QHS, Gabapentin 600 mg TID and Cymbalta 60 mg BID.       ROS:  Per HPI-  Otherwise the remainder of ROS was negative    Social Hx  Social History     Socioeconomic History    Marital status:    Tobacco Use    Smoking status: Never    Smokeless tobacco: Never   Substance and Sexual Activity    Alcohol use: Not Currently    Drug use: Not Currently       Meds:  Current Outpatient Medications on File Prior to Visit   Medication Sig Dispense Refill    oxyCODONE IR (ROXICODONE) 5 mg immediate release tablet Take 1 Tablet by mouth three (3) times daily. tiZANidine (ZANAFLEX) 6 mg capsule Take 1 Capsule by mouth nightly. 30 Capsule 3    predniSONE (DELTASONE) 5 mg tablet       DULoxetine (CYMBALTA) 60 mg capsule Take 1 Capsule by mouth two (2) times a day.  [address] [address] Capsule 3    carBAMazepine XR (TEGretol XR) 100 mg SR tablet Take 1 Tablet by mouth two (2) times a day. (Patient not taking: Reported on 1/12/2023) 60 Tablet 3     No current facility-administered medications on file prior to visit. Imaging:    CT Results (recent):  Results from Hospital Encounter encounter on 03/18/22    CTA CHEST W OR W WO CONT    Narrative  EXAM: CTA CHEST W OR W WO CONT    INDICATION: SOB    COMPARISON: None. CONTRAST: 80 mL of Isovue-370. TECHNIQUE:  Precontrast  images were obtained to localize the volume for acquisition. Multislice helical CT arteriography was performed from the diaphragm to the  thoracic inlet during uneventful rapid bolus intravenous contrast  administration. Lung and soft tissue windows were generated. Coronal and  sagittal images were generated and 3D post processing consisting of coronal  maximum intensity images was performed. CT dose reduction was achieved through  use of a standardized protocol tailored for this examination and automatic  exposure control for dose modulation. FINDINGS:  THYROID: No nodule. MEDIASTINUM: No mass or lymphadenopathy. Residual thymic tissue is seen in the  anterior mediastinum. RIA: Subcentimeter lymph nodes in both ria are likely reactive. THORACIC AORTA: No dissection or aneurysm. PULMONARY ARTERIES: Normal in caliber. The pulmonary arteries are well enhanced. No evidence of pulmonary embolus. TRACHEA/BRONCHI: Patent. ESOPHAGUS: No wall thickening or dilatation. HEART: Normal in size. PLEURA: No effusion or pneumothorax. LUNGS: No nodule, mass, or airspace disease. Mild atelectasis is seen in the  lung bases. INCIDENTALLY IMAGED UPPER ABDOMEN: No focal abnormality. BONES: No destructive bone lesion. Impression  1. Negative for pulmonary embolus. 2. Mild atelectasis in the lung bases. Krzysztof Alexandre       MRI Results (recent):  Results from [place] encounter on 09/30/22    MRI CERV SPINE W WO CONT    Narrative  EXAM: MRI CERV SPINE W WO CONT  Clinical history: right-sided neck pain  INDICATION: R neck pain; history of Cervical myelitis; evaluate for interval  worsening. Cervicalgia    COMPARISON: 5/9/2022    TECHNIQUE: MR imaging of the cervical spine was performed using the following  sequences: sagittal T1, T2, STIR;  axial T2, T1 prior to and following contrast  administration. CONTRAST: ProHance        FINDINGS:    No Chiari or syrinx. Mild reversal of cervical lordosis. .  There are no  suspicious marrow lesions or evidence of acute bony trauma. Increased signal  within the cord at the cervical medullary junction has not significantly changed  in size. No enhancement and no new cord lesions are demonstrated. Paraspinous  soft tissues are within normal limits. Craniocervical junction: Intact. Without stenosis    C2-C3: No stenosis    C3-C4: No stenosis    C4-C5: Small disc osteophytic bulge and uncovertebral degenerative change. The  canal is patent. Minimal right foraminal stenosis. No significant change. C5-C6: Minimal left uncovertebral degenerative change without stenosis. Loss of  disc height. Disc bulge. No significant change. C6-C7: Minimal disc osteophytic bulge asymmetric to the left, slightly narrowing  the proximal left foramen. Disc desiccation. No significant change. Examination of the upper thoracic spine demonstrates no significant stenosis. Impression  Intramedullary increased T2 lesion cervical medullary junction is not  significantly changed. No new lesions or areas of abnormal enhancement. No cord expansion      IR Results (recent):  No results found for this or any previous visit. VAS/US Results (recent):  No results found for this or any previous visit. Reviewed records in [employer] tab today    Lab Review     No visits with results within 3 Month(s) from this visit.    Latest known visit with results is:   Admission on 05/17/2022, Discharged on 05/17/2022   Component Date Value Ref Range Status    WBC 05/17/2022 11.1 (A)  3.6 - 11.0 K/uL Final    RBC 05/17/2022 3.98  3.80 - 5.20 M/uL Final    HGB 05/17/2022 10.7 (A)  11.5 - 16.0 g/dL Final    HCT 05/17/2022 35.7  35.0 - 47.0 % Final    MCV 05/17/2022 89.7  80.0 - 99.0 FL Final    MCH 05/17/2022 26.9  26.0 - 34.0 PG Final    MCHC 05/17/2022 30.0  30.0 - 36.5 g/dL Final    RDW 05/17/2022 19.2 (A)  11.5 - 14.5 % Final    PLATELET 51/96/8435 663 (A)  150 - 400 K/uL Final    MPV 05/17/2022 8.7 (A)  8.9 - 12.9 FL Final    NRBC 05/17/2022 0.0  0  WBC Final    ABSOLUTE NRBC 05/17/2022 0.00  0.00 - 0.01 K/uL Final    NEUTROPHILS 05/17/2022 76 (A)  32 - 75 % Final    LYMPHOCYTES 05/17/2022 10 (A)  12 - 49 % Final    MONOCYTES 05/17/2022 6  5 - 13 % Final    EOSINOPHILS 05/17/2022 7  0 - 7 % Final    BASOPHILS 05/17/2022 1  0 - 1 % Final    IMMATURE GRANULOCYTES 05/17/2022 0  0.0 - 0.5 % Final    ABS. NEUTROPHILS 05/17/2022 8.5 (A)  1.8 - 8.0 K/UL Final    ABS. LYMPHOCYTES 05/17/2022 1.1  0.8 - 3.5 K/UL Final    ABS. MONOCYTES 05/17/2022 0.7  0.0 - 1.0 K/UL Final    ABS. EOSINOPHILS 05/17/2022 0.7 (A)  0.0 - 0.4 K/UL Final    ABS. BASOPHILS 05/17/2022 0.1  0.0 - 0.1 K/UL Final    ABS. IMM.  GRANS. 05/17/2022 0.0  0.00 - 0.04 K/UL Final    DF 05/17/2022 AUTOMATED    Final    Sodium 05/17/2022 135 (A)  136 - 145 mmol/L Final    Potassium 05/17/2022 4.3  3.5 - 5.1 mmol/L Final    SPECIMEN HEMOLYZED, RESULTS MAY BE AFFECTED    Chloride 05/17/2022 104  97 - 108 mmol/L Final    CO2 05/17/2022 27  21 - 32 mmol/L Final    Anion gap 05/17/2022 4 (A)  5 - 15 mmol/L Final    Glucose 05/17/2022 90  65 - 100 mg/dL Final    BUN 05/17/2022 6  6 - 20 MG/DL Final    Creatinine 05/17/2022 0.61  0.55 - 1.02 MG/DL Final    BUN/Creatinine ratio 05/17/2022 10 (A)  12 - 20   Final    GFR est AA 05/17/2022 >60  >60 ml/min/1.73m2 Final    GFR est non-AA 05/17/2022 >60  >60 ml/min/1.73m2 Final    Estimated GFR is calculated using the IDMS-traceable Modification of Diet in Renal Disease (MDRD) Study equation, reported for both  Americans (GFRAA) and non- Americans (GFRNA), and normalized to 1.73m2 body surface area. The physician must decide which value applies to the patient. Calcium 05/17/2022 8.5  8.5 - 10.1 MG/DL Final    Bilirubin, total 05/17/2022 0.5  0.2 - 1.0 MG/DL Final    ALT (SGPT) 05/17/2022 172 (A)  12 - 78 U/L Final    AST (SGOT) 05/17/2022 178 (A)  15 - 37 U/L Final    SPECIMEN HEMOLYZED, RESULTS MAY BE AFFECTED    Alk. phosphatase 05/17/2022 47  45 - 117 U/L Final    Protein, total 05/17/2022 7.4  6.4 - 8.2 g/dL Final    Albumin 05/17/2022 2.5 (A)  3.5 - 5.0 g/dL Final    Globulin 05/17/2022 4.9 (A)  2.0 - 4.0 g/dL Final    A-G Ratio 05/17/2022 0.5 (A)  1.1 - 2.2   Final    Sed rate, automated 05/17/2022 65 (A)  0 - 20 mm/hr Final    SAMPLES BEING HELD 05/17/2022 1SST,1RED   Final    COMMENT 05/17/2022 Add-on orders for these samples will be processed based on acceptable specimen integrity and analyte stability, which may vary by analyte. Final         Objective:       Exam:  Visit Vitals  /60   Pulse (!) 114   Ht 5' 3\" (1.6 m)   Wt 69.9 kg (154 lb)   SpO2 98%   BMI 27.28 kg/m²     Gen: Awake, alert, follows commands  Appropriate appearance, normal speech. Oriented to all spheres. No visual field defect on confrontation exam.  Full eyes movement, with no nystagmus, no diplopia, no ptosis. Normal gag and swallow. All remaining cranial nerves were normal  Motor function: 5/5 in all extremities  Sensory: intact to LT, PP and JPS  Good FTN and HTS   Gait: Normal    Assessment:       ICD-10-CM ICD-9-CM    1. Acute transverse myelitis (HCC)  G37.3 341.20       2. Occipital neuralgia of right side  M54.81 723.8 gabapentin (NEURONTIN) 600 mg tablet      3.  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 No new lesions or areas of abnormal enhancement. No cord expansion     R neck and R occipital pain, possible related to rheumatoid arthritis at this point  vs R occipital neuralgia from VZV    Plan:   Trial of Trileptal 150 mg  BID  2. Continue Tizanidine to 6 mg QHS  3. Continue Compounded pain cream (Amantadine, Baclofen, Gabapentin, AMitriptyline, Bupivacaine, Clonidine)  4. Continue Cymbalta to 60 mg BID for now. WIll taper off depending on above  5. Continue Gabapentin 600 mg TID for now  6. Follow up with her rheumatologist and fibrosis doctor regarding steroid treatment and Kevzara    Follow-up and Dispositions    Return in about 2 months (around 3/12/2023).                Angy Feng MD  Diplomate, American Board of Psychiatry and Neurology  Diplomate, Neuromuscular Medicine  Diplomate, American Board of Electrodiagnostic Medicine

## 2023-01-12 ENCOUNTER — OFFICE VISIT (OUTPATIENT)
Dept: NEUROLOGY | Age: 38
End: 2023-01-12
Payer: COMMERCIAL

## 2023-01-12 VITALS
DIASTOLIC BLOOD PRESSURE: 60 MMHG | WEIGHT: 154 LBS | BODY MASS INDEX: 27.29 KG/M2 | SYSTOLIC BLOOD PRESSURE: 110 MMHG | HEART RATE: 114 BPM | HEIGHT: 63 IN | OXYGEN SATURATION: 98 %

## 2023-01-12 DIAGNOSIS — G37.3 ACUTE TRANSVERSE MYELITIS (HCC): Primary | ICD-10-CM

## 2023-01-12 DIAGNOSIS — M54.81 OCCIPITAL NEURALGIA OF RIGHT SIDE: ICD-10-CM

## 2023-01-12 DIAGNOSIS — M54.2 NECK PAIN: ICD-10-CM

## 2023-01-12 PROCEDURE — 99215 OFFICE O/P EST HI 40 MIN: CPT | Performed by: PSYCHIATRY & NEUROLOGY

## 2023-01-12 RX ORDER — OXCARBAZEPINE 150 MG/1
150 TABLET, FILM COATED ORAL 2 TIMES DAILY
Qty: 60 TABLET | Refills: 2 | Status: SHIPPED | OUTPATIENT
Start: 2023-01-12

## 2023-01-12 RX ORDER — GABAPENTIN 600 MG/1
600 TABLET ORAL 3 TIMES DAILY
Qty: 90 TABLET | Refills: 3 | Status: SHIPPED | OUTPATIENT
Start: 2023-01-12

## 2023-01-12 NOTE — LETTER
1/12/2023    Patient: Eli Porter   YOB: 1985   Date of Visit: 1/12/2023     Audrey Witt, [address] [address]  Via Fax: [phone]    Dear Audrey Witt DO,      Thank you for referring Ms. Eli Porter to [facility] for evaluation. My notes for this consultation are attached. If you have questions, please do not hesitate to call me. I look forward to following your patient along with you.       Sincerely,    Negin Jackson MD

## 2023-01-23 ENCOUNTER — APPOINTMENT (OUTPATIENT)
Dept: PHYSICAL THERAPY | Age: 38
End: 2023-01-23

## 2023-02-07 ENCOUNTER — PATIENT MESSAGE (OUTPATIENT)
Dept: NEUROLOGY | Age: 38
End: 2023-02-07

## 2023-02-07 ENCOUNTER — TELEPHONE (OUTPATIENT)
Dept: NEUROLOGY | Age: 38
End: 2023-02-07

## 2023-02-07 DIAGNOSIS — M54.81 OCCIPITAL NEURALGIA OF RIGHT SIDE: ICD-10-CM

## 2023-02-07 DIAGNOSIS — M54.2 NECK PAIN: ICD-10-CM

## 2023-02-07 RX ORDER — GABAPENTIN 600 MG/1
600 TABLET ORAL 3 TIMES DAILY
Qty: 90 TABLET | Refills: 3 | Status: CANCELLED | OUTPATIENT
Start: 2023-02-07

## 2023-03-21 RX ORDER — TIZANIDINE HYDROCHLORIDE 6 MG/1
CAPSULE, GELATIN COATED ORAL
Qty: 30 CAPSULE | Refills: 3 | Status: SHIPPED | OUTPATIENT
Start: 2023-03-21

## 2023-05-01 ENCOUNTER — OFFICE VISIT (OUTPATIENT)
Dept: NEUROLOGY | Age: 38
End: 2023-05-01
Payer: COMMERCIAL

## 2023-05-01 VITALS
OXYGEN SATURATION: 100 % | HEIGHT: 62 IN | SYSTOLIC BLOOD PRESSURE: 120 MMHG | HEART RATE: 103 BPM | WEIGHT: 148 LBS | BODY MASS INDEX: 27.23 KG/M2 | DIASTOLIC BLOOD PRESSURE: 70 MMHG | TEMPERATURE: 97.5 F

## 2023-05-01 DIAGNOSIS — G37.3 ACUTE TRANSVERSE MYELITIS (HCC): ICD-10-CM

## 2023-05-01 DIAGNOSIS — M54.2 NECK PAIN: Primary | ICD-10-CM

## 2023-05-01 DIAGNOSIS — M54.81 OCCIPITAL NEURALGIA OF RIGHT SIDE: ICD-10-CM

## 2023-05-01 PROCEDURE — 99215 OFFICE O/P EST HI 40 MIN: CPT | Performed by: PSYCHIATRY & NEUROLOGY

## 2023-05-01 RX ORDER — NORTRIPTYLINE HYDROCHLORIDE 10 MG/1
10 CAPSULE ORAL
Qty: 30 CAPSULE | Refills: 3 | Status: SHIPPED | OUTPATIENT
Start: 2023-05-01

## 2023-05-11 RX ORDER — DULOXETIN HYDROCHLORIDE 60 MG/1
CAPSULE, DELAYED RELEASE ORAL
Qty: 180 CAPSULE | Refills: 3 | Status: SHIPPED | OUTPATIENT
Start: 2023-05-11

## 2023-07-14 ENCOUNTER — TELEPHONE (OUTPATIENT)
Age: 38
End: 2023-07-14

## 2023-07-28 RX ORDER — TIZANIDINE HYDROCHLORIDE 6 MG/1
CAPSULE, GELATIN COATED ORAL
Qty: 30 CAPSULE | Refills: 3 | Status: SHIPPED | OUTPATIENT
Start: 2023-07-28

## 2023-07-31 NOTE — PROGRESS NOTES
Neurology Progress Note    Patient ID:  Jeaneth Mendez  [id number]  45 y.o.  1985      Subjective:   History:  Jeaneth Mendez is a female who  has a past medical history of Raynaud's disease and Rheumatoid arthritis ([address] who was admitted 3/4/22 at [facility] for 2 weeks R neck pain. Eventually, patient developed numbness of the R side of face and R neck. (+) R UE weakness. MRI brain unremarkable. 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 Patient given SoluMedrol and ID Dr Rodolfo Ha consulted for (+) VZV. Patient saw rheumatologist Dr Debora Rocha and was placed on steroid with decreased neck pain. Robaxin did not work. On 9/1/22, patient had R occipital nerve block whic helped but only lasted for 1 week. Saw a neurologist at [facility] who specializes in inflammation who thought R occipital pain is due to Varicella Zoster virus. Tegretol and Trileptal caused upset stomach. Patient now on Lyrica 150 mg BID c/o pulmonologist to help with cough. Now off Gabapentin. Also started Nortriptyline 10 mg QHS which helps her sleep, but still wakes up in the middle of the night due to pain. Still on Tizanidine 6 mg QHS. Uses pain cream as needed. Still on Cymbalta 60 mg BID. Pain gets worse when it is raining. ROS:  Per HPI-  Otherwise the remainder of ROS was negative    Social Hx  Social History     Socioeconomic History    Marital status:    Tobacco Use    Smoking status: Never    Smokeless tobacco: Never   Substance and Sexual Activity    Alcohol use: Not Currently    Drug use: Not Currently       Meds:  Current Outpatient Medications on File Prior to Visit   Medication Sig Dispense Refill    pregabalin (LYRICA) 150 MG capsule Take 1 capsule by mouth 2 times daily.       tiZANidine (ZANAFLEX) 6 MG capsule TAKE 1 CAPSULE BY MOUTH EVERY NIGHT 30 capsule 3    DULoxetine (CYMBALTA) 60 MG extended release capsule TAKE 1 CAPSULE BY MOUTH TWICE A

## 2023-08-01 ENCOUNTER — OFFICE VISIT (OUTPATIENT)
Age: 38
End: 2023-08-01
Payer: COMMERCIAL

## 2023-08-01 VITALS
DIASTOLIC BLOOD PRESSURE: 70 MMHG | HEIGHT: 62 IN | SYSTOLIC BLOOD PRESSURE: 120 MMHG | BODY MASS INDEX: 27.07 KG/M2 | TEMPERATURE: 97 F

## 2023-08-01 DIAGNOSIS — M54.81 OCCIPITAL NEURALGIA OF RIGHT SIDE: Primary | ICD-10-CM

## 2023-08-01 DIAGNOSIS — G37.3 ACUTE TRANSVERSE MYELITIS IN DEMYELINATING DISEASE OF CENTRAL NERVOUS SYSTEM (HCC): ICD-10-CM

## 2023-08-01 PROCEDURE — 99215 OFFICE O/P EST HI 40 MIN: CPT | Performed by: PSYCHIATRY & NEUROLOGY

## 2023-08-01 RX ORDER — PREGABALIN 150 MG/1
150 CAPSULE ORAL 2 TIMES DAILY
COMMUNITY
Start: 2023-06-28

## 2023-08-01 RX ORDER — NORTRIPTYLINE HYDROCHLORIDE 10 MG/1
CAPSULE ORAL
COMMUNITY
Start: 2023-06-28 | End: 2023-08-01 | Stop reason: DRUGHIGH

## 2023-08-01 RX ORDER — BUTALBITAL, ACETAMINOPHEN AND CAFFEINE 50; 325; 40 MG/1; MG/1; MG/1
1 TABLET ORAL EVERY 6 HOURS PRN
Qty: 30 TABLET | Refills: 1 | Status: SHIPPED | OUTPATIENT
Start: 2023-08-01

## 2023-08-01 RX ORDER — NORTRIPTYLINE HYDROCHLORIDE 25 MG/1
25 CAPSULE ORAL NIGHTLY
Qty: 30 CAPSULE | Refills: 3 | Status: SHIPPED | OUTPATIENT
Start: 2023-08-01

## 2023-10-17 ENCOUNTER — TELEPHONE (OUTPATIENT)
Age: 38
End: 2023-10-17

## 2023-10-17 DIAGNOSIS — M54.2 CERVICALGIA: Primary | ICD-10-CM

## 2023-10-17 NOTE — TELEPHONE ENCOUNTER
Patient would like a call from the doctor regarding her medication Zanaflex not working.  Stated a nurse friend of hers recommended SOMA / CARISOPRODOL      Please contact

## 2023-10-18 RX ORDER — CARISOPRODOL 250 MG/1
250 TABLET ORAL NIGHTLY
Qty: 30 TABLET | Refills: 2 | Status: SHIPPED | OUTPATIENT
Start: 2023-10-18 | End: 2024-01-16

## 2023-10-18 NOTE — TELEPHONE ENCOUNTER
Called pt.  verified. Pt states that the Tizanidine is not working. Pt states that she feels like she is use to the medication now and it was helping last year. Pt states that she is still taking the medication but will be d/c because it not working. Pt also wanted to inform Dr. Mitchel Del Rosario that the Nortriptyline is not working as well and that she will be d/c this medication to.

## 2023-10-19 NOTE — TELEPHONE ENCOUNTER
Called pt.  verified. Inform pt that Dr. Nile Steinberg sent in [facility] medication to pharmacy. Pt verbalizes understanding.

## 2023-10-20 RX ORDER — BUTALBITAL, ACETAMINOPHEN AND CAFFEINE 50; 325; 40 MG/1; MG/1; MG/1
1 TABLET ORAL EVERY 6 HOURS PRN
Qty: 30 TABLET | Refills: 1 | Status: SHIPPED | OUTPATIENT
Start: 2023-10-20

## 2023-10-23 ENCOUNTER — TELEPHONE (OUTPATIENT)
Age: 38
End: 2023-10-23

## 2023-10-23 NOTE — TELEPHONE ENCOUNTER
RE:Carisoprodol  Key: [id number]        [facility] notification that medication was denied. Coverage for this medication is denied for the following reason(s). We reviewed the information we  received about your condition and circumstances. We used plan approved criteria when making this  decision. The policy states that this medication may be covered when the requested product cannot be  switched to a formulary alternative. Based on the policy and the information we received your request  was denied. The request was denied because the requested medication can be switched to the  formulary alternative. Formulary alternative is: cyclobenzaprine (except cyclobenzaprine tablet 7.5 mg).  (Requirement: 3 in a  class with 3 or more alternatives, 2 in a class with 2 alternatives, or 1 in a class with only 1 alternative.)    Letter scanned in   Nurse notified

## 2023-10-25 RX ORDER — CYCLOBENZAPRINE HCL 10 MG
10 TABLET ORAL NIGHTLY PRN
Qty: 30 TABLET | Refills: 1 | Status: SHIPPED | OUTPATIENT
Start: 2023-10-25 | End: 2023-12-24

## 2023-11-29 RX ORDER — BUTALBITAL, ACETAMINOPHEN AND CAFFEINE 50; 325; 40 MG/1; MG/1; MG/1
1 TABLET ORAL EVERY 6 HOURS PRN
Qty: 30 TABLET | Refills: 1 | OUTPATIENT
Start: 2023-11-29

## 2023-12-06 ENCOUNTER — TELEPHONE (OUTPATIENT)
Age: 38
End: 2023-12-06

## 2023-12-06 DIAGNOSIS — M54.2 CERVICALGIA: ICD-10-CM

## 2023-12-06 NOTE — TELEPHONE ENCOUNTER
Patient requesting to resubmit the mediction:    Grace    She stated she tried medication Flexeral for a month and her insurance wanted her to try before getting Soma Medication.

## 2023-12-08 RX ORDER — CARISOPRODOL 250 MG/1
250 TABLET ORAL NIGHTLY
Qty: 30 TABLET | Refills: 2 | Status: SHIPPED | OUTPATIENT
Start: 2023-12-08 | End: 2024-03-07

## 2023-12-08 NOTE — TELEPHONE ENCOUNTER
Patient tried Flexeril with no benefit.     Will put back on Soma, which worked in the past.    Esteban Yan MD

## 2023-12-22 NOTE — TELEPHONE ENCOUNTER
Patient states we sent a prescription for soma 250MG to [facility], but they are unable to fill it, they don't have it in stock. But [facility], but they have it in [address] [place] [address] Way. Patient is asking if you can send the 350 MG to the [facility] at [address] [facility], [place], [address] [facility], [address] Phone: [phone] Thank you!

## 2023-12-27 RX ORDER — TIZANIDINE HYDROCHLORIDE 6 MG/1
CAPSULE, GELATIN COATED ORAL
Qty: 30 CAPSULE | Refills: 3 | Status: SHIPPED | OUTPATIENT
Start: 2023-12-27

## 2023-12-28 ENCOUNTER — TELEPHONE (OUTPATIENT)
Age: 38
End: 2023-12-28

## 2023-12-28 NOTE — TELEPHONE ENCOUNTER
Patient requesting a call to discuss medication the denial of medication:    Carisoprodol    She stated was told to try another medication and she did  now she wants to discuss getting a PA for this medication.

## 2024-01-04 RX ORDER — CYCLOBENZAPRINE HCL 10 MG
10 TABLET ORAL NIGHTLY PRN
Qty: 30 TABLET | Refills: 3 | Status: SHIPPED | OUTPATIENT
Start: 2024-01-04 | End: 2024-05-03

## 2024-01-05 ENCOUNTER — TELEPHONE (OUTPATIENT)
Age: 39
End: 2024-01-05

## 2024-01-24 NOTE — PROGRESS NOTES
Neurology Progress Note    Patient ID:  Danya White  [id number]  38 y.o.  1985      Subjective:   History:  Danya White is a female who  has a past medical history of Raynaud's disease and Rheumatoid arthritis (HCC). who was admitted 3/4/22 at [facility] for 2 weeks R neck pain. Eventually, patient developed numbness of the R side of face and R neck. (+) R UE weakness. MRI brain unremarkable. 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 Patient given SoluMedrol and ID Dr Vigil consulted for (+) VZV. Patient saw rheumatologist Dr Mazariegos and was placed on steroid with decreased neck pain. Robaxin did not work. On 9/1/22, patient had R occipital nerve block whic helped but only lasted for 1 week. Saw a neurologist at [facility] who specializes in inflammation who thought R occipital pain is due to Varicella Zoster virus. Tegretol and Trileptal caused upset stomach.    Patient continues to get waxing and waning pain from 5-10/10 in intensity    Tried Robaxin, Tizanidine and Flexeril.    Stopped Nortriptyline since it did not help. Takes Fioricet 5-10/ week.     Still on Lyrica 150 mg BID c/o pulmonologist to help with cough.   Still on Cymbalta 60 mg BID    Seeing pain management c/o PCP on Oxycodone and Lidocaine patch.      ROS:  Per HPI-  Otherwise the remainder of ROS was negative    Social Hx  Social History     Socioeconomic History    Marital status:    Tobacco Use    Smoking status: Never    Smokeless tobacco: Never   Substance and Sexual Activity    Alcohol use: Not Currently    Drug use: Not Currently       Meds:  Current Outpatient Medications on File Prior to Visit   Medication Sig Dispense Refill    Upadacitinib (RINVOQ PO) Take by mouth      cyclobenzaprine (FLEXERIL) 10 MG tablet TAKE 1 TABLET BY MOUTH NIGHTLY AS NEEDED FOR MUSCLE SPASMS. 30 tablet 3    butalbital-acetaminophen-caffeine (FIORICET, ESGIC) -40 MG 
Patient/surrogate refused vaccine...

## 2024-01-25 ENCOUNTER — OFFICE VISIT (OUTPATIENT)
Age: 39
End: 2024-01-25
Payer: COMMERCIAL

## 2024-01-25 VITALS
DIASTOLIC BLOOD PRESSURE: 70 MMHG | SYSTOLIC BLOOD PRESSURE: 130 MMHG | HEART RATE: 72 BPM | HEIGHT: 62 IN | TEMPERATURE: 96.6 F | OXYGEN SATURATION: 100 % | BODY MASS INDEX: 27.07 KG/M2

## 2024-01-25 DIAGNOSIS — M54.81 OCCIPITAL NEURALGIA OF RIGHT SIDE: ICD-10-CM

## 2024-01-25 DIAGNOSIS — G04.91 MYELITIS, UNSPECIFIED (HCC): ICD-10-CM

## 2024-01-25 DIAGNOSIS — M54.2 CERVICALGIA: Primary | ICD-10-CM

## 2024-01-25 DIAGNOSIS — G43.709 CHRONIC MIGRAINE WITHOUT AURA WITHOUT STATUS MIGRAINOSUS, NOT INTRACTABLE: Primary | ICD-10-CM

## 2024-01-25 PROCEDURE — 99215 OFFICE O/P EST HI 40 MIN: CPT | Performed by: PSYCHIATRY & NEUROLOGY

## 2024-01-25 RX ORDER — CARISOPRODOL 350 MG/1
350 TABLET ORAL
Qty: 30 TABLET | Refills: 3 | Status: SHIPPED | OUTPATIENT
Start: 2024-01-25 | End: 2024-05-24

## 2024-01-25 RX ORDER — BUTALBITAL, ACETAMINOPHEN AND CAFFEINE 50; 325; 40 MG/1; MG/1; MG/1
1 TABLET ORAL EVERY 6 HOURS PRN
Qty: 30 TABLET | Refills: 1 | Status: SHIPPED | OUTPATIENT
Start: 2024-01-25

## 2024-02-07 RX ORDER — NORTRIPTYLINE HYDROCHLORIDE 10 MG/1
CAPSULE ORAL
Qty: 90 CAPSULE | Refills: 1 | OUTPATIENT
Start: 2024-02-07

## 2024-02-13 NOTE — TELEPHONE ENCOUNTER
Called pt.  verified. Pt states that she is no longer using the nortriptyline. Pt states that the medication does not work.

## 2024-02-28 DIAGNOSIS — M54.2 CERVICALGIA: ICD-10-CM

## 2024-02-28 DIAGNOSIS — M54.81 OCCIPITAL NEURALGIA, UNSPECIFIED LATERALITY: Primary | ICD-10-CM

## 2024-02-28 RX ORDER — NORTRIPTYLINE HYDROCHLORIDE 25 MG/1
CAPSULE ORAL
Qty: 90 CAPSULE | Refills: 1 | Status: SHIPPED | OUTPATIENT
Start: 2024-02-28

## 2024-02-28 RX ORDER — CARISOPRODOL 350 MG/1
350 TABLET ORAL
Qty: 30 TABLET | Refills: 3 | OUTPATIENT
Start: 2024-02-28 | End: 2024-06-27

## 2024-04-08 DIAGNOSIS — G43.709 CHRONIC MIGRAINE WITHOUT AURA WITHOUT STATUS MIGRAINOSUS, NOT INTRACTABLE: ICD-10-CM

## 2024-04-24 ENCOUNTER — TELEPHONE (OUTPATIENT)
Age: 39
End: 2024-04-24

## 2024-04-24 DIAGNOSIS — G43.709 CHRONIC MIGRAINE WITHOUT AURA WITHOUT STATUS MIGRAINOSUS, NOT INTRACTABLE: ICD-10-CM

## 2024-04-24 RX ORDER — BUTALBITAL, ACETAMINOPHEN AND CAFFEINE 50; 325; 40 MG/1; MG/1; MG/1
1 TABLET ORAL EVERY 6 HOURS PRN
Qty: 30 TABLET | Refills: 1 | Status: SHIPPED | OUTPATIENT
Start: 2024-04-24

## 2024-04-24 RX ORDER — BUTALBITAL, ACETAMINOPHEN AND CAFFEINE 50; 325; 40 MG/1; MG/1; MG/1
1 TABLET ORAL EVERY 6 HOURS PRN
Qty: 30 TABLET | Refills: 1 | OUTPATIENT
Start: 2024-04-24

## 2024-06-21 ENCOUNTER — TELEPHONE (OUTPATIENT)
Age: 39
End: 2024-06-21

## 2024-06-21 DIAGNOSIS — M54.2 CERVICALGIA: Primary | ICD-10-CM

## 2024-06-28 RX ORDER — CARISOPRODOL 350 MG/1
350 TABLET ORAL NIGHTLY
Qty: 120 TABLET | Refills: 0 | Status: SHIPPED | OUTPATIENT
Start: 2024-06-28 | End: 2024-10-26

## 2024-06-28 NOTE — TELEPHONE ENCOUNTER
Patient requesting refill Carisoprodo      Patient has been out of meds, is leaving town on Sun.

## 2024-06-28 NOTE — TELEPHONE ENCOUNTER
Called [facility] pharmacy and spoke with Stevo. Gave verbal order for carisoprodol. Stevo verbalizes understanding and states pt will be notified when medication is ready for pickup.

## 2024-07-08 DIAGNOSIS — G43.709 CHRONIC MIGRAINE WITHOUT AURA WITHOUT STATUS MIGRAINOSUS, NOT INTRACTABLE: ICD-10-CM

## 2024-07-09 RX ORDER — BUTALBITAL, ACETAMINOPHEN AND CAFFEINE 50; 325; 40 MG/1; MG/1; MG/1
1 TABLET ORAL EVERY 6 HOURS PRN
Qty: 30 TABLET | Refills: 1 | Status: SHIPPED | OUTPATIENT
Start: 2024-07-09

## 2024-07-24 NOTE — PROGRESS NOTES
Neurology Progress Note    Patient ID:  Danya White  [id number]  39 y.o.  1985      Subjective:   History:  Danya White is a female who  has a past medical history of Raynaud's disease and Rheumatoid arthritis (HCC). who was admitted 3/4/22 at [facility] for 2 weeks R neck pain. Eventually, patient developed numbness of the R side of face and R neck. (+) R UE weakness. MRI brain unremarkable. 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 Patient given SoluMedrol and ID Dr Vigil consulted for (+) VZV. Patient saw rheumatologist Dr Mazariegos and was placed on steroid with decreased neck pain. Robaxin did not work. On 9/1/22, patient had R occipital nerve block whic helped but only lasted for 1 week. Saw a neurologist at [facility] who specializes in inflammation who thought R occipital pain is due to Varicella Zoster virus. Tegretol and Trileptal caused upset stomach. Tried Nortriptyline, Robaxin, Tizanidine and Flexeril.      Takes Soma 350 mg QHS which works the best.    Only takes Cymbalta 60 mg prn BID and Fioricet prn.     Now on the list for lung transplant c/o [facility] on Lyrica for coughing.    ROS:  Per HPI-  Otherwise the remainder of ROS was negative    Social Hx  Social History     Socioeconomic History    Marital status:    Tobacco Use    Smoking status: Never    Smokeless tobacco: Never   Substance and Sexual Activity    Alcohol use: Not Currently    Drug use: Not Currently       Meds:  Current Outpatient Medications on File Prior to Visit   Medication Sig Dispense Refill    butalbital-acetaminophen-caffeine (FIORICET, ESGIC) -40 MG per tablet TAKE 1 TABLET BY MOUTH EVERY 6 HOURS AS NEEDED FOR HEADACHES (FOR SEVERE PAIN) 30 tablet 1    carisoprodol (SOMA) 350 MG tablet Take 1 tablet by mouth nightly for 120 days. Max Daily Amount: 350 mg 120 tablet 0    Upadacitinib (RINVOQ PO) Take by mouth      pregabalin (LYRICA) 150

## 2024-07-25 ENCOUNTER — OFFICE VISIT (OUTPATIENT)
Age: 39
End: 2024-07-25
Payer: COMMERCIAL

## 2024-07-25 VITALS
HEIGHT: 63 IN | TEMPERATURE: 96.8 F | OXYGEN SATURATION: 99 % | HEART RATE: 91 BPM | DIASTOLIC BLOOD PRESSURE: 70 MMHG | SYSTOLIC BLOOD PRESSURE: 120 MMHG | BODY MASS INDEX: 26.22 KG/M2

## 2024-07-25 DIAGNOSIS — G04.91 MYELITIS, UNSPECIFIED (HCC): ICD-10-CM

## 2024-07-25 DIAGNOSIS — M54.81 OCCIPITAL NEURALGIA, UNSPECIFIED LATERALITY: Primary | ICD-10-CM

## 2024-07-25 PROCEDURE — 99214 OFFICE O/P EST MOD 30 MIN: CPT | Performed by: PSYCHIATRY & NEUROLOGY

## 2024-07-31 DIAGNOSIS — M54.2 CERVICALGIA: ICD-10-CM

## 2024-08-01 RX ORDER — CARISOPRODOL 350 MG/1
TABLET ORAL
Qty: 30 TABLET | Refills: 5 | Status: SHIPPED | OUTPATIENT
Start: 2024-08-01 | End: 2025-01-28

## 2024-08-27 ENCOUNTER — TELEPHONE (OUTPATIENT)
Age: 39
End: 2024-08-27

## 2024-08-27 NOTE — TELEPHONE ENCOUNTER
Patient is calling to request a refill of her CYMBALTA to be filled at the [facility] on file.

## 2024-08-28 RX ORDER — DULOXETIN HYDROCHLORIDE 60 MG/1
60 CAPSULE, DELAYED RELEASE ORAL 2 TIMES DAILY
Qty: 180 CAPSULE | Refills: 1 | Status: SHIPPED | OUTPATIENT
Start: 2024-08-28

## 2024-09-24 DIAGNOSIS — G43.709 CHRONIC MIGRAINE WITHOUT AURA WITHOUT STATUS MIGRAINOSUS, NOT INTRACTABLE: ICD-10-CM

## 2024-09-25 RX ORDER — BUTALBITAL, ACETAMINOPHEN AND CAFFEINE 50; 325; 40 MG/1; MG/1; MG/1
1 TABLET ORAL EVERY 6 HOURS PRN
Qty: 30 TABLET | Refills: 1 | Status: SHIPPED | OUTPATIENT
Start: 2024-09-25 | End: 2024-09-27 | Stop reason: SDUPTHER

## 2024-09-25 RX ORDER — BUTALBITAL, ACETAMINOPHEN AND CAFFEINE 50; 325; 40 MG/1; MG/1; MG/1
1 TABLET ORAL EVERY 6 HOURS PRN
Qty: 30 TABLET | Refills: 1 | OUTPATIENT
Start: 2024-09-25

## 2024-09-27 ENCOUNTER — PATIENT MESSAGE (OUTPATIENT)
Age: 39
End: 2024-09-27

## 2024-09-27 DIAGNOSIS — G43.709 CHRONIC MIGRAINE WITHOUT AURA WITHOUT STATUS MIGRAINOSUS, NOT INTRACTABLE: ICD-10-CM

## 2024-09-27 RX ORDER — BUTALBITAL, ACETAMINOPHEN AND CAFFEINE 50; 325; 40 MG/1; MG/1; MG/1
1 TABLET ORAL EVERY 6 HOURS PRN
Qty: 30 TABLET | Refills: 1 | Status: SHIPPED | OUTPATIENT
Start: 2024-09-27

## 2024-09-27 NOTE — TELEPHONE ENCOUNTER
Patient requesting refill be called to   [facility]   ([facility] is out of stock)    [facility] [place] [place]  [phone]    Butal-acetamin-caffeine  -40    Pt asking if this can be done today?

## 2024-11-10 DIAGNOSIS — M54.2 CERVICALGIA: ICD-10-CM

## 2024-11-12 DIAGNOSIS — M54.2 CERVICALGIA: ICD-10-CM

## 2024-11-14 RX ORDER — CARISOPRODOL 350 MG/1
350 TABLET ORAL
Qty: 30 TABLET | Refills: 5 | Status: SHIPPED | OUTPATIENT
Start: 2024-11-14 | End: 2025-05-13

## 2024-11-14 RX ORDER — CARISOPRODOL 350 MG/1
TABLET ORAL
Qty: 30 TABLET | Refills: 5 | OUTPATIENT
Start: 2024-11-14 | End: 2025-05-13

## 2024-11-14 NOTE — TELEPHONE ENCOUNTER
Called [facility] on 11/13/24 and spoke with Coreen. States the Soma medication was d/c 10/16/24.

## 2024-12-11 DIAGNOSIS — G43.709 CHRONIC MIGRAINE WITHOUT AURA WITHOUT STATUS MIGRAINOSUS, NOT INTRACTABLE: ICD-10-CM

## 2024-12-11 RX ORDER — BUTALBITAL, ACETAMINOPHEN AND CAFFEINE 50; 325; 40 MG/1; MG/1; MG/1
1 TABLET ORAL EVERY 6 HOURS PRN
Qty: 30 TABLET | Refills: 1 | Status: SHIPPED | OUTPATIENT
Start: 2024-12-11

## 2025-02-13 DIAGNOSIS — G43.709 CHRONIC MIGRAINE WITHOUT AURA WITHOUT STATUS MIGRAINOSUS, NOT INTRACTABLE: ICD-10-CM

## 2025-02-17 RX ORDER — BUTALBITAL, ACETAMINOPHEN AND CAFFEINE 50; 325; 40 MG/1; MG/1; MG/1
1 TABLET ORAL EVERY 6 HOURS PRN
Qty: 30 TABLET | Refills: 1 | Status: SHIPPED | OUTPATIENT
Start: 2025-02-17

## 2025-02-19 RX ORDER — DULOXETIN HYDROCHLORIDE 60 MG/1
60 CAPSULE, DELAYED RELEASE ORAL 2 TIMES DAILY
Qty: 180 CAPSULE | Refills: 1 | Status: SHIPPED | OUTPATIENT
Start: 2025-02-19

## 2025-04-08 DIAGNOSIS — G43.709 CHRONIC MIGRAINE WITHOUT AURA WITHOUT STATUS MIGRAINOSUS, NOT INTRACTABLE: ICD-10-CM

## 2025-04-14 RX ORDER — BUTALBITAL, ACETAMINOPHEN AND CAFFEINE 50; 325; 40 MG/1; MG/1; MG/1
1 TABLET ORAL EVERY 6 HOURS PRN
Qty: 30 TABLET | Refills: 1 | Status: SHIPPED | OUTPATIENT
Start: 2025-04-14

## 2025-05-05 NOTE — PROGRESS NOTES
from [facility]       Meds:  Current Outpatient Medications on File Prior to Visit   Medication Sig Dispense Refill    butalbital-acetaminophen-caffeine (FIORICET, ESGIC) -40 MG per tablet Take 1 tablet by mouth every 6 hours as needed for Headaches (for severe pain) 30 tablet 1    DULoxetine (CYMBALTA) 60 MG extended release capsule TAKE 1 CAPSULE BY MOUTH TWICE A  capsule 1    carisoprodol (SOMA) 350 MG tablet Take 1 tablet by mouth nightly for 180 days. Max Daily Amount: 350 mg 30 tablet 5    Upadacitinib (RINVOQ PO) Take by mouth      pregabalin (LYRICA) 150 MG capsule Take 1 capsule by mouth 2 times daily.      oxyCODONE (ROXICODONE) 5 MG immediate release tablet Take 1 tablet by mouth 3 times daily. (Patient not taking: Reported on 5/6/2025)       No current facility-administered medications on file prior to visit.       Imaging:    CT Results (recent):  @[employer](DFB4784:1)@    MRI Results (recent):  @[employer](PLN9317:1)@    IR Results (recent):  @[employer](TEW7976:1)@    VAS/US Results (recent):  @[employer](VXP8682:1)@    Reviewed records in [facility] and [employer] tab today    Lab Review     No visits with results within 3 Month(s) from this visit.   Latest known visit with results is:   No results found for any previous visit.         Objective:       Exam:  Vitals:    05/06/25 1152   BP: 128/78   Pulse: (!) 106   Resp: 18   SpO2: 99%     Gen: Awake, alert, follows commands  Appropriate appearance, normal speech.  Oriented to all spheres.  No visual field defect on confrontation exam.  Full eyes movement, with no nystagmus, no diplopia, no ptosis.  Normal gag and swallow.  All remaining cranial nerves were normal  Motor function: 5/5 in all extremities  Sensory: intact to LT, PP and JPS  Good FTN and HTS   Gait: Normal    Assessment:       ICD-10-CM    1. Chronic migraine without aura without status migrainosus, not intractable  G43.709

## 2025-05-06 ENCOUNTER — OFFICE VISIT (OUTPATIENT)
Age: 40
End: 2025-05-06
Payer: COMMERCIAL

## 2025-05-06 VITALS
DIASTOLIC BLOOD PRESSURE: 78 MMHG | OXYGEN SATURATION: 99 % | HEART RATE: 106 BPM | RESPIRATION RATE: 18 BRPM | SYSTOLIC BLOOD PRESSURE: 128 MMHG

## 2025-05-06 DIAGNOSIS — D89.89 ANTISYNTHETASE SYNDROME: ICD-10-CM

## 2025-05-06 DIAGNOSIS — M54.81 OCCIPITAL NEURALGIA, UNSPECIFIED LATERALITY: ICD-10-CM

## 2025-05-06 DIAGNOSIS — M54.2 CERVICALGIA: ICD-10-CM

## 2025-05-06 DIAGNOSIS — G43.709 CHRONIC MIGRAINE WITHOUT AURA WITHOUT STATUS MIGRAINOSUS, NOT INTRACTABLE: Primary | ICD-10-CM

## 2025-05-06 PROCEDURE — 99214 OFFICE O/P EST MOD 30 MIN: CPT | Performed by: PSYCHIATRY & NEUROLOGY

## 2025-05-06 PROCEDURE — G8419 CALC BMI OUT NRM PARAM NOF/U: HCPCS | Performed by: PSYCHIATRY & NEUROLOGY

## 2025-05-06 PROCEDURE — G8427 DOCREV CUR MEDS BY ELIG CLIN: HCPCS | Performed by: PSYCHIATRY & NEUROLOGY

## 2025-05-06 PROCEDURE — 1036F TOBACCO NON-USER: CPT | Performed by: PSYCHIATRY & NEUROLOGY

## 2025-05-27 ENCOUNTER — PATIENT MESSAGE (OUTPATIENT)
Age: 40
End: 2025-05-27

## 2025-05-27 DIAGNOSIS — G43.709 CHRONIC MIGRAINE WITHOUT AURA WITHOUT STATUS MIGRAINOSUS, NOT INTRACTABLE: ICD-10-CM

## 2025-05-27 DIAGNOSIS — M54.2 CERVICALGIA: Primary | ICD-10-CM

## 2025-05-28 RX ORDER — CARISOPRODOL 350 MG/1
350 TABLET ORAL
Qty: 30 TABLET | Refills: 5 | Status: SHIPPED | OUTPATIENT
Start: 2025-05-28 | End: 2025-11-24

## 2025-05-29 RX ORDER — BUTALBITAL, ACETAMINOPHEN AND CAFFEINE 50; 325; 40 MG/1; MG/1; MG/1
1 TABLET ORAL EVERY 6 HOURS PRN
Qty: 30 TABLET | Refills: 1 | OUTPATIENT
Start: 2025-05-29

## 2025-06-02 DIAGNOSIS — G43.709 CHRONIC MIGRAINE WITHOUT AURA WITHOUT STATUS MIGRAINOSUS, NOT INTRACTABLE: ICD-10-CM

## 2025-06-02 RX ORDER — BUTALBITAL, ACETAMINOPHEN AND CAFFEINE 50; 325; 40 MG/1; MG/1; MG/1
1 TABLET ORAL EVERY 6 HOURS PRN
Qty: 30 TABLET | Refills: 1 | Status: SHIPPED | OUTPATIENT
Start: 2025-06-02

## 2025-07-24 DIAGNOSIS — G43.709 CHRONIC MIGRAINE WITHOUT AURA WITHOUT STATUS MIGRAINOSUS, NOT INTRACTABLE: ICD-10-CM

## 2025-07-24 RX ORDER — BUTALBITAL, ACETAMINOPHEN AND CAFFEINE 50; 325; 40 MG/1; MG/1; MG/1
1 TABLET ORAL EVERY 6 HOURS PRN
Qty: 30 TABLET | Refills: 1 | Status: SHIPPED | OUTPATIENT
Start: 2025-07-24

## 2025-08-11 RX ORDER — DULOXETIN HYDROCHLORIDE 60 MG/1
60 CAPSULE, DELAYED RELEASE ORAL 2 TIMES DAILY
Qty: 180 CAPSULE | Refills: 1 | Status: SHIPPED | OUTPATIENT
Start: 2025-08-11